# Patient Record
Sex: FEMALE | Race: WHITE | Employment: FULL TIME
[De-identification: names, ages, dates, MRNs, and addresses within clinical notes are randomized per-mention and may not be internally consistent; named-entity substitution may affect disease eponyms.]

---

## 2017-06-03 ENCOUNTER — HEALTH MAINTENANCE LETTER (OUTPATIENT)
Age: 42
End: 2017-06-03

## 2017-07-14 ENCOUNTER — DOCUMENTATION ONLY (OUTPATIENT)
Dept: NEUROSURGERY | Facility: CLINIC | Age: 42
End: 2017-07-14

## 2017-07-14 NOTE — PROGRESS NOTES
[employer] forms complete   Certification paperwork for your family medical leave request Leave ID [id number]  Placed a copy in the bin and sent the original to medical records   Faxed to [phone]

## 2017-12-08 ENCOUNTER — THERAPY VISIT (OUTPATIENT)
Dept: PHYSICAL THERAPY | Facility: CLINIC | Age: 42
End: 2017-12-08
Payer: COMMERCIAL

## 2017-12-08 DIAGNOSIS — G89.29 CHRONIC PAIN OF RIGHT KNEE: Primary | Chronic | ICD-10-CM

## 2017-12-08 DIAGNOSIS — M25.561 CHRONIC PAIN OF RIGHT KNEE: Primary | Chronic | ICD-10-CM

## 2017-12-08 PROCEDURE — 97140 MANUAL THERAPY 1/> REGIONS: CPT | Mod: GP | Performed by: PHYSICAL THERAPIST

## 2017-12-08 PROCEDURE — 97110 THERAPEUTIC EXERCISES: CPT | Mod: GP | Performed by: PHYSICAL THERAPIST

## 2017-12-08 PROCEDURE — 97161 PT EVAL LOW COMPLEX 20 MIN: CPT | Mod: GP | Performed by: PHYSICAL THERAPIST

## 2017-12-08 ASSESSMENT — ACTIVITIES OF DAILY LIVING (ADL)
SQUAT: ACTIVITY IS SOMEWHAT DIFFICULT
STAND: ACTIVITY IS NOT DIFFICULT
RISE FROM A CHAIR: ACTIVITY IS SOMEWHAT DIFFICULT
LIMPING: THE SYMPTOM AFFECTS MY ACTIVITY MODERATELY
KNEE_ACTIVITY_OF_DAILY_LIVING_SUM: 43
HOW_WOULD_YOU_RATE_THE_OVERALL_FUNCTION_OF_YOUR_KNEE_DURING_YOUR_USUAL_DAILY_ACTIVITIES?: ABNORMAL
GO UP STAIRS: ACTIVITY IS SOMEWHAT DIFFICULT
KNEEL ON THE FRONT OF YOUR KNEE: ACTIVITY IS FAIRLY DIFFICULT
PAIN: THE SYMPTOM AFFECTS MY ACTIVITY SEVERELY
WEAKNESS: THE SYMPTOM AFFECTS MY ACTIVITY SLIGHTLY
WALK: ACTIVITY IS MINIMALLY DIFFICULT
KNEE_ACTIVITY_OF_DAILY_LIVING_SCORE: 61.43
RAW_SCORE: 43
AS_A_RESULT_OF_YOUR_KNEE_INJURY,_HOW_WOULD_YOU_RATE_YOUR_CURRENT_LEVEL_OF_DAILY_ACTIVITY?: ABNORMAL
GIVING WAY, BUCKLING OR SHIFTING OF KNEE: THE SYMPTOM AFFECTS MY ACTIVITY SLIGHTLY
SIT WITH YOUR KNEE BENT: ACTIVITY IS NOT DIFFICULT
GO DOWN STAIRS: ACTIVITY IS FAIRLY DIFFICULT
STIFFNESS: THE SYMPTOM AFFECTS MY ACTIVITY SLIGHTLY
SWELLING: I HAVE THE SYMPTOM BUT IT DOES NOT AFFECT MY ACTIVITY

## 2017-12-08 NOTE — LETTER
[facility]  [address]  [facility] [id number] [phone]    2017    Re: Sun Andrade   :   1975  MRN:  [id number]   REFERRING PHYSICIAN:   Referred Self    [facility]  Date of Initial Evaluation:  17  Visits:   1  Reason for Referral:  Chronic pain of right knee    EVALUATION SUMMARY    [facility]    Subjective:    Patient is a 42 year old female presenting with rehab left knee hpi. The history is provided by the patient. No  was used.   Sun Andrade is a 42 year old female with a right knee condition.  Condition occurred with:  Repetition/overuse.  Condition occurred: in the community.  This is a chronic condition   Got really sick in 2017 and was sick for 1.5 months.  Had a significant decreased in activity level during this time.  She significantly increased her activity level in 2017 with running, walking around theme [place] and doing more squatting.  Started as a sharp pain while she was running.  Now she has rested some and had some relief.  Every time she has pain with running.  Pain also increases with using R foot on gas pedal with driving, stairs, and deep squatting. She has gained 15# since July..    Patient reports pain:  Anterior (medial>lateral patella).  Radiates to:  Thigh.  Pain is described as aching and sharp and is constant and reported as 8/10.  Associated symptoms:  Numbness, loss of motion/stiffness, buckling/giving out and edema. Pain is worse in the P.M..  Symptoms are exacerbated by standing, descending stairs, sitting, bending/squatting, running and lying on the extremity and relieved by rest.  Since onset symptoms are unchanged.        General health as reported by patient is good.  Pertinent medical history includes:  Overweight and depression.    Other surgeries  "include:  Orthopedic surgery and other (, lumbar discectomy).  Current medications:  Anti-depressants and anti-inflammatory.  Current occupation is RN - Health     Pt goal: return to running and weight lifting without pain.  Patient is working in normal job without restrictions.  Primary job tasks include:  Prolonged sitting.  Barriers include:  Stairs.  Red flags:  None as reported by the patient.             Objective:    Sun Andrade , : 1975, MRN: [id number]    Physical Therapy Objective Findings  Subjective information, goals, clinical impression, daily documentation and other information found in EPISODES tab.  Knee Objective Findings    Posture: level hips, even wt bearing    Gait:  pes planus, R leg abducted mildly    Foot Position in Standing:  Pes planus    Hip Screen: - scour, - KEVIN, - EDISONIR    Range of Motion:                                    Right AROM            Right PROM Left AROM              Left PROM                Extension wnl wnl wnl wnl   Flexion wnl wnl wnl wnl   Other:         Manual Muscle Testing  (graded 0-5, measured at 0 degrees unless otherwise noted):                                                                       Right                                                  Left                                                      Flexion 4          4   Extension 4+ (++) 5   Hip Abduction 4- 4   Quad Set     VMO     Other: glute max 4- 4-   (+ mild pain, ++ moderate pain, +++ severe pain)    Edema:                                                                        Right                                                  Left                                                             Special Tests:                                                                    Right                                                   Left                                                      Step Test Height 6\" 6\"         -Control Comment Mod medial collapse Minimal " medial collapse   Functional Squat (deg.)     Lachmans -    Posterior Sag -    Anterior Drawer -    Posterior Drawer -    Varus at 0 & 30 -    Valgus at 0 & 30 -    Sury's + medial mensicus    Apley's Distraction     Apley's Compression     External Rotation Test     OTHER:       Flexibility:                                                                    Right                                                   Left                                                      Gastroc wnl wnl   Soleus     Hamstrings wnl wnl   Hip Flexors wnl    Quadricep wnl wnl   ITB wnl wnl          Joint Mobility                                                                       Right                                                   Left                                                       Patellar Static Position Mild lateral tilt Mild tilt lateral   Patellar Passive Mobility normal normal   Patellar Dynamic Mobility Mod lateral tracking normal   Proximal Tib-fib     Tibiofemoral            Palpation:  Tender at medial jt line, tender lateral patellar facet    Assessment/Plan:    Patient is a 42 year old female with right side knee complaints.    Patient has the following significant findings with corresponding treatment plan.                Diagnosis 1:  R knee pain consistent with medial meniscus irritation with mild PFP  Pain -  hot/cold therapy, manual therapy, self management, education and home program  Decreased ROM/flexibility - manual therapy, therapeutic exercise and home program  Decreased joint mobility - manual therapy, therapeutic exercise and home program  Decreased strength - therapeutic exercise, therapeutic activities and home program  Impaired gait - gait training and home program  Decreased function - therapeutic activities and home program    Therapy Evaluation Codes:   1) History comprised of:   Personal factors that impact the plan of care:      Profession and Time since onset of symptoms.    Comorbidity  factors that impact the plan of care are:      Depression and Overweight.     Medications impacting care: Anti-inflammatory.  2) Examination of Body Systems comprised of:   Body structures and functions that impact the plan of care:      Knee.   Activity limitations that impact the plan of care are:      Lifting, Running, Squatting/kneeling, Stairs and Working.  3) Clinical presentation characteristics are:   Stable/Uncomplicated.  4) Decision-Making    Low complexity using standardized patient assessment instrument and/or measureable assessment of functional outcome.  Cumulative Therapy Evaluation is: Low complexity.    Previous and current functional limitations:  (See Goal Flow Sheet for this information)    Short term and Long term goals: (See Goal Flow Sheet for this information)     Communication ability:  Patient appears to be able to clearly communicate and understand verbal and written communication and follow directions correctly.  Treatment Explanation - The following has been discussed with the patient:   RX ordered/plan of care  Anticipated outcomes  Possible risks and side effects  This patient would benefit from PT intervention to resume normal activities.   Rehab potential is good.    Frequency:  1 X week, once daily  Duration:  for 6 weeks  Discharge Plan:  Achieve all LTG.  Independent in home treatment program.  Reach maximal therapeutic benefit.    Thank you for your referral.    INQUIRIES  Therapist:  Paul Zavala,PT, DPT, [facility]    [facility]  [address] [place] [address] [address]  [facility] [address]  Phone: [phone]  Fax: [phone]

## 2017-12-08 NOTE — MR AVS SNAPSHOT
After Visit Summary   12/8/2017    Sun Andrade    MRN: [id number]           Patient Information     Date Of Birth          1975        Visit Information        Provider Department      12/8/2017 8:20 AM Paul Zavala, PT [facility] Physical Therapy        Today's Diagnoses     Chronic pain of right knee    -  1       Follow-ups after your visit        Your next 10 appointments already scheduled     Dec 15, 2017  8:20 AM CST   ROSITA Extremity with Paul Zavala PT   [facility] Physical Therapy ([facility]  )    [address]  [facility] [address]   [phone]            Dec 22, 2017  8:20 AM CST   ROSITA Extremity with Paul Zavala PT   [facility] Physical Therapy ([facility]  )    [address]  [facility] [address]   [phone]            Dec 28, 2017  4:30 PM CST   ROSITA Extremity with Paul Zavala PT   [facility] ([facility]  )    [address]  [facility] [address]   [phone]              Who to contact     If you have questions or need follow up information about today's clinic visit or your schedule please contact [facility] PHYSICAL THERAPY directly at [phone].  Normal or non-critical lab and imaging results will be communicated to you by MyChart, letter or phone within 4 business days after the clinic has received the results. If you do not hear from us within 7 days, please contact the clinic through MyChart or phone. If you have a critical or abnormal lab result, we will notify you by phone as soon as possible.  Submit refill requests through [employer] or call your pharmacy and they will forward the refill request to us. Please allow 3 business days for your refill to be completed.          Additional Information About Your  Visit        MyChart Information     [employer] gives you secure access to your electronic health record. If you see a primary care provider, you can also send messages to your care team and make appointments. If you have questions, please call your primary care clinic.  If you do not have a primary care provider, please call [phone] and they will assist you.        Care EveryWhere ID     This is your Care EveryWhere ID. This could be used by other organizations to access your [facility] medical records  [id number]         Blood Pressure from Last 3 Encounters:   06/23/11 104/64   05/26/11 106/70   01/19/11 108/68    Weight from Last 3 Encounters:   06/23/11 83 kg (183 lb)   05/26/11 81.2 kg (179 lb)   01/19/11 85.3 kg (188 lb)              We Performed the Following     HC PT EVAL, LOW COMPLEXITY     ROSITA INITIAL EVAL REPORT     MANUAL THER TECH,1+REGIONS,EA 15 MIN     THERAPEUTIC EXERCISES        Primary Care Provider Office Phone # Fax #    Vanessa Saldana [phone] [address] [facility] [address]        Equal Access to Services     LUCIUS [facility] WesleyGALILEO : Hadii aad ku hadasho Soomaali, waaxda luqadaha, qaybta kaalmada adecarmenyacindy, yuly marina . So [facility] [phone].    ATENCIÓN: Si habla español, tiene a chun disposición servicios gratuitos de asistencia lingüística. [facility] [phone].    We comply with applicable federal civil rights laws and [place] laws. We do not discriminate on the basis of race, color, national origin, age, disability, sex, sexual orientation, or gender identity.            Thank you!     Thank you for choosing [facility] PHYSICAL THERAPY  for your care. Our goal is always to provide you with excellent care. Hearing back from our patients is one way we can continue to improve our services. Please take a few minutes to complete the written survey that you may receive in the mail after your visit with us.  Thank you!             Your Updated Medication List - Protect others around you: Learn how to safely use, store and throw away your medicines at www.disposemymeds.org.          This list is accurate as of: 12/8/17  1:09 PM.  Always use your most recent med list.                   Brand Name Dispense Instructions for use Diagnosis    NO ACTIVE MEDICATIONS       Screening examination for infectious disease       trimethoprim-polymyxin b ophthalmic solution    POLYTRIM    1 Bottle    Place 1 drop into both eyes 4 times daily.    Conjunctivitis, acute

## 2017-12-08 NOTE — PROGRESS NOTES
[facility] Initial Evaluation    Subjective:    Patient is a 42 year old female presenting with rehab left knee hpi. The history is provided by the patient. No  was used.   Sun Andrade is a 42 year old female with a right knee condition.  Condition occurred with:  Repetition/overuse.  Condition occurred: in the community.  This is a chronic condition   Got really sick in 2017 and was sick for 1.5 months.  Had a significant decreased in activity level during this time.  She significantly increased her activity level in 2017 with running, walking around theme [place] and doing more squatting.  Started as a sharp pain while she was running.  Now she has rested some and had some relief.  Every time she has pain with running.  Pain also increases with using R foot on gas pedal with driving, stairs, and deep squatting. She has gained 15# since July..    Patient reports pain:  Anterior (medial>lateral patella).  Radiates to:  Thigh.  Pain is described as aching and sharp and is constant and reported as 8/10.  Associated symptoms:  Numbness, loss of motion/stiffness, buckling/giving out and edema. Pain is worse in the P.M..  Symptoms are exacerbated by standing, descending stairs, sitting, bending/squatting, running and lying on the extremity and relieved by rest.  Since onset symptoms are unchanged.        General health as reported by patient is good.  Pertinent medical history includes:  Overweight and depression.    Other surgeries include:  Orthopedic surgery and other (, lumbar discectomy).  Current medications:  Anti-depressants and anti-inflammatory.  Current occupation is RN - Health     Pt goal: return to running and weight lifting without pain.  Patient is working in normal job without restrictions.  Primary job tasks include:  Prolonged sitting.    Barriers include:  Stairs.    Red flags:  None as reported by the patient.                     "    Objective:    System    Physical Exam    General     WERO Andrade , : 1975, MRN: [id number]    Physical Therapy Objective Findings  Subjective information, goals, clinical impression, daily documentation and other information found in EPISODES tab.  Knee Objective Findings    Posture: level hips, even wt bearing    Gait:  pes planus, R leg abducted mildly    Foot Position in Standing:  Pes planus    Hip Screen: - scour, - KEVIN, - FADIR    Range of Motion:                                    Right AROM            Right PROM Left AROM              Left PROM                Extension wnl wnl wnl wnl   Flexion wnl wnl wnl wnl   Other:         Manual Muscle Testing  (graded 0-5, measured at 0 degrees unless otherwise noted):                                                                       Right                                                  Left                                                      Flexion 4          4   Extension 4+ (++) 5   Hip Abduction 4- 4   Quad Set     VMO     Other: glute max 4- 4-   (+ mild pain, ++ moderate pain, +++ severe pain)    Edema:                                                                        Right                                                  Left                                                               Special Tests:                                                                    Right                                                   Left                                                      Step Test Height 6\" 6\"         -Control Comment Mod medial collapse Minimal medial collapse   Functional Squat (deg.)     Lachmans -    Posterior Sag -    Anterior Drawer -    Posterior Drawer -    Varus at 0 & 30 -    Valgus at 0 & 30 -    Sury's + medial mensicus    Apley's Distraction     Apley's Compression     External Rotation Test     OTHER:       Flexibility:                                                                    Right           "                                         Left                                                      Gastroc wnl wnl   Soleus     Hamstrings wnl wnl   Hip Flexors wnl    Quadricep wnl wnl   ITB wnl wnl          Joint Mobility                                                                       Right                                                   Left                                                       Patellar Static Position Mild lateral tilt Mild tilt lateral   Patellar Passive Mobility normal normal   Patellar Dynamic Mobility Mod lateral tracking normal   Proximal Tib-fib     Tibiofemoral            Palpation:  Tender at medial jt line, tender lateral patellar facet    Assessment/Plan:      Patient is a 42 year old female with right side knee complaints.    Patient has the following significant findings with corresponding treatment plan.                Diagnosis 1:  R knee pain consistent with medial meniscus irritation with mild PFP    Pain -  hot/cold therapy, manual therapy, self management, education and home program  Decreased ROM/flexibility - manual therapy, therapeutic exercise and home program  Decreased joint mobility - manual therapy, therapeutic exercise and home program  Decreased strength - therapeutic exercise, therapeutic activities and home program  Impaired gait - gait training and home program  Decreased function - therapeutic activities and home program    Therapy Evaluation Codes:   1) History comprised of:   Personal factors that impact the plan of care:      Profession and Time since onset of symptoms.    Comorbidity factors that impact the plan of care are:      Depression and Overweight.     Medications impacting care: Anti-inflammatory.  2) Examination of Body Systems comprised of:   Body structures and functions that impact the plan of care:      Knee.   Activity limitations that impact the plan of care are:      Lifting, Running, Squatting/kneeling, Stairs and Working.  3) Clinical  presentation characteristics are:   Stable/Uncomplicated.  4) Decision-Making    Low complexity using standardized patient assessment instrument and/or measureable assessment of functional outcome.  Cumulative Therapy Evaluation is: Low complexity.    Previous and current functional limitations:  (See Goal Flow Sheet for this information)    Short term and Long term goals: (See Goal Flow Sheet for this information)     Communication ability:  Patient appears to be able to clearly communicate and understand verbal and written communication and follow directions correctly.  Treatment Explanation - The following has been discussed with the patient:   RX ordered/plan of care  Anticipated outcomes  Possible risks and side effects  This patient would benefit from PT intervention to resume normal activities.   Rehab potential is good.    Frequency:  1 X week, once daily  Duration:  for 6 weeks  Discharge Plan:  Achieve all LTG.  Independent in home treatment program.  Reach maximal therapeutic benefit.    Please refer to the daily flowsheet for treatment today, total treatment time and time spent performing 1:1 timed codes.         Paul Zavala,PT, DPT, OCS

## 2017-12-15 ENCOUNTER — THERAPY VISIT (OUTPATIENT)
Dept: PHYSICAL THERAPY | Facility: CLINIC | Age: 42
End: 2017-12-15
Payer: COMMERCIAL

## 2017-12-15 DIAGNOSIS — G89.29 CHRONIC PAIN OF RIGHT KNEE: ICD-10-CM

## 2017-12-15 DIAGNOSIS — M25.561 CHRONIC PAIN OF RIGHT KNEE: ICD-10-CM

## 2017-12-15 PROCEDURE — 97110 THERAPEUTIC EXERCISES: CPT | Mod: GP | Performed by: PHYSICAL THERAPIST

## 2017-12-15 PROCEDURE — 97140 MANUAL THERAPY 1/> REGIONS: CPT | Mod: GP | Performed by: PHYSICAL THERAPIST

## 2017-12-15 PROCEDURE — 97112 NEUROMUSCULAR REEDUCATION: CPT | Mod: GP | Performed by: PHYSICAL THERAPIST

## 2017-12-22 ENCOUNTER — THERAPY VISIT (OUTPATIENT)
Dept: PHYSICAL THERAPY | Facility: CLINIC | Age: 42
End: 2017-12-22
Payer: COMMERCIAL

## 2017-12-22 DIAGNOSIS — G89.29 CHRONIC PAIN OF RIGHT KNEE: ICD-10-CM

## 2017-12-22 DIAGNOSIS — M25.561 CHRONIC PAIN OF RIGHT KNEE: ICD-10-CM

## 2017-12-22 PROCEDURE — 97110 THERAPEUTIC EXERCISES: CPT | Mod: GP | Performed by: PHYSICAL THERAPIST

## 2017-12-22 PROCEDURE — 97112 NEUROMUSCULAR REEDUCATION: CPT | Mod: GP | Performed by: PHYSICAL THERAPIST

## 2017-12-22 PROCEDURE — 97140 MANUAL THERAPY 1/> REGIONS: CPT | Mod: GP | Performed by: PHYSICAL THERAPIST

## 2017-12-28 ENCOUNTER — THERAPY VISIT (OUTPATIENT)
Dept: PHYSICAL THERAPY | Facility: CLINIC | Age: 42
End: 2017-12-28
Payer: COMMERCIAL

## 2017-12-28 DIAGNOSIS — G89.29 CHRONIC PAIN OF RIGHT KNEE: ICD-10-CM

## 2017-12-28 DIAGNOSIS — M25.561 CHRONIC PAIN OF RIGHT KNEE: ICD-10-CM

## 2017-12-28 PROCEDURE — 97112 NEUROMUSCULAR REEDUCATION: CPT | Mod: GP | Performed by: PHYSICAL THERAPIST

## 2017-12-28 PROCEDURE — 97110 THERAPEUTIC EXERCISES: CPT | Mod: GP | Performed by: PHYSICAL THERAPIST

## 2017-12-28 PROCEDURE — 97140 MANUAL THERAPY 1/> REGIONS: CPT | Mod: GP | Performed by: PHYSICAL THERAPIST

## 2018-01-04 ENCOUNTER — THERAPY VISIT (OUTPATIENT)
Dept: PHYSICAL THERAPY | Facility: CLINIC | Age: 43
End: 2018-01-04
Payer: COMMERCIAL

## 2018-01-04 DIAGNOSIS — M25.561 CHRONIC PAIN OF RIGHT KNEE: ICD-10-CM

## 2018-01-04 DIAGNOSIS — G89.29 CHRONIC PAIN OF RIGHT KNEE: ICD-10-CM

## 2018-01-04 PROCEDURE — 97140 MANUAL THERAPY 1/> REGIONS: CPT | Mod: GP | Performed by: PHYSICAL THERAPIST

## 2018-01-04 PROCEDURE — 97110 THERAPEUTIC EXERCISES: CPT | Mod: GP | Performed by: PHYSICAL THERAPIST

## 2018-01-04 PROCEDURE — 97112 NEUROMUSCULAR REEDUCATION: CPT | Mod: GP | Performed by: PHYSICAL THERAPIST

## 2018-01-15 ENCOUNTER — THERAPY VISIT (OUTPATIENT)
Dept: PHYSICAL THERAPY | Facility: CLINIC | Age: 43
End: 2018-01-15
Payer: COMMERCIAL

## 2018-01-15 DIAGNOSIS — M25.561 CHRONIC PAIN OF RIGHT KNEE: ICD-10-CM

## 2018-01-15 DIAGNOSIS — G89.29 CHRONIC PAIN OF RIGHT KNEE: ICD-10-CM

## 2018-01-15 PROCEDURE — 97112 NEUROMUSCULAR REEDUCATION: CPT | Mod: GP | Performed by: PHYSICAL THERAPIST

## 2018-01-15 PROCEDURE — 97140 MANUAL THERAPY 1/> REGIONS: CPT | Mod: GP | Performed by: PHYSICAL THERAPIST

## 2018-01-15 PROCEDURE — 97110 THERAPEUTIC EXERCISES: CPT | Mod: GP | Performed by: PHYSICAL THERAPIST

## 2018-01-15 NOTE — MR AVS SNAPSHOT
After Visit Summary   1/15/2018    Sun Andrade    MRN: [id number]           Patient Information     Date Of Birth          1975        Visit Information        Provider Department      1/15/2018 10:20 AM Paul Zavala PT [facility] Physical [facility]        Today's Diagnoses     Chronic pain of right knee           Follow-ups after your visit        Who to contact     If you have questions or need follow up information about today's clinic visit or your schedule please contact [facility] PHYSICAL THERAPY directly at [phone].  Normal or non-critical lab and imaging results will be communicated to you by [employer], letter or phone within 4 business days after the clinic has received the results. If you do not hear from us within 7 days, please contact the clinic through [employer] or phone. If you have a critical or abnormal lab result, we will notify you by phone as soon as possible.  Submit refill requests through [employer] or call your pharmacy and they will forward the refill request to us. Please allow 3 business days for your refill to be completed.          Additional Information About Your Visit        MyChart Information     [employer] gives you secure access to your electronic health record. If you see a primary care provider, you can also send messages to your care team and make appointments. If you have questions, please call your primary care clinic.  If you do not have a primary care provider, please call [phone] and they will assist you.        Care EveryWhere ID     This is your Care EveryWhere ID. This could be used by other organizations to access your [place] medical records  [id number]         Blood Pressure from Last 3 Encounters:   06/23/11 104/64   05/26/11 106/70   01/19/11 108/68    Weight from Last 3 Encounters:   06/23/11 83 kg (183 lb)   05/26/11 81.2 kg (179 lb)   01/19/11 85.3 kg (188 lb)               We Performed the Following     ROSITA PROGRESS NOTES REPORT     MANUAL THER TECH,1+REGIONS,EA 15 MIN     NEUROMUSCULAR RE-EDUCATION     THERAPEUTIC EXERCISES        Primary Care Provider Office Phone # Fax #    Vanessa Saldana [phone] [address] [place] [place] [address]        Equal Access to Services     LUCIUS HYATT : Hadii aad ku hadasho Soomaali, waaxda luqadaha, qaybta kaalmada adeegyada, waxay idiin hayaan adeeg kharash la'aan ah. So [facility] [phone].    ATENCIÓN: Si habla español, tiene a chun disposición servicios gratuitos de asistencia lingüística. [facility] [phone].    We comply with applicable federal civil rights laws and [place] laws. We do not discriminate on the basis of race, color, national origin, age, disability, sex, sexual orientation, or gender identity.            Thank you!     Thank you for choosing [facility] PHYSICAL THERAPY  for your care. Our goal is always to provide you with excellent care. Hearing back from our patients is one way we can continue to improve our services. Please take a few minutes to complete the written survey that you may receive in the mail after your visit with us. Thank you!             Your Updated Medication List - Protect others around you: Learn how to safely use, store and throw away your medicines at www.disposemymeds.org.          This list is accurate as of: 1/15/18  1:10 PM.  Always use your most recent med list.                   Brand Name Dispense Instructions for use Diagnosis    NO ACTIVE MEDICATIONS       Screening examination for infectious disease       trimethoprim-polymyxin b ophthalmic solution    POLYTRIM    1 Bottle    Place 1 drop into both eyes 4 times daily.    Conjunctivitis, acute

## 2018-01-15 NOTE — PROGRESS NOTES
Subjective:  HPI                    Objective:  System    Physical Exam    General     ROS    Assessment/Plan:    PROGRESS  REPORT    Progress reporting period is from 12/8/17 to 1/15/18.       SUBJECTIVE  Subjective changes noted by patient:  was doing very well, was going to gym 3x/week, no problems doing interval training on treadmill for running, things got more sore again after she slipped on the ice over weekend    Current Pain level: 3/10.     Previous pain level was  NA Initial Pain level: 8/10.   Changes in function:  Yes (See Goal flowsheet attached for changes in current functional level)  Adverse reaction to treatment or activity: None    OBJECTIVE  Changes noted in objective findings:    Objective: + standing forward flex R, + march R, single limb squat: R mod medial collapse, L mild medial collapse, mod los R hip extension, mild lateral tracking R patella, MMT hip abd 4+/5, hip ext 4/5     ASSESSMENT/PLAN  Updated problem list and treatment plan: Diagnosis 1:  R knee pain consistent with medial meniscus irritation with mild PFP    Pain -  hot/cold therapy, manual therapy, self management, education and home program  Decreased joint mobility - manual therapy, therapeutic exercise and home program  Decreased strength - therapeutic exercise, therapeutic activities and home program  Decreased function - therapeutic activities and home program  STG/LTGs have been met or progress has been made towards goals:  Yes (See Goal flow sheet completed today.)  Assessment of Progress: The patient's condition is was improving as expected prior to fall on ice.  Self Management Plans:  Patient has been instructed in a home treatment program.  I have re-evaluated this patient and find that the nature, scope, duration and intensity of the therapy is appropriate for the medical condition of the patient.  Sun continues to require the following intervention to meet STG and LTG's:  PT    Recommendations:  This patient  would benefit from continued therapy.     Frequency:  1 X week, once daily  Duration:  for 1 weeks          Please refer to the daily flowsheet for treatment today, total treatment time and time spent performing 1:1 timed codes.    Paul Zavala,PT, DPT, OCS

## 2018-01-30 ENCOUNTER — THERAPY VISIT (OUTPATIENT)
Dept: PHYSICAL THERAPY | Facility: CLINIC | Age: 43
End: 2018-01-30
Payer: COMMERCIAL

## 2018-01-30 DIAGNOSIS — G89.29 CHRONIC PAIN OF RIGHT KNEE: ICD-10-CM

## 2018-01-30 DIAGNOSIS — M25.561 CHRONIC PAIN OF RIGHT KNEE: ICD-10-CM

## 2018-01-30 PROCEDURE — 97110 THERAPEUTIC EXERCISES: CPT | Mod: GP | Performed by: PHYSICAL THERAPIST

## 2018-01-30 PROCEDURE — 97112 NEUROMUSCULAR REEDUCATION: CPT | Mod: GP | Performed by: PHYSICAL THERAPIST

## 2018-01-30 ASSESSMENT — ACTIVITIES OF DAILY LIVING (ADL)
STIFFNESS: I HAVE THE SYMPTOM BUT IT DOES NOT AFFECT MY ACTIVITY
GIVING WAY, BUCKLING OR SHIFTING OF KNEE: I DO NOT HAVE THE SYMPTOM
AS_A_RESULT_OF_YOUR_KNEE_INJURY,_HOW_WOULD_YOU_RATE_YOUR_CURRENT_LEVEL_OF_DAILY_ACTIVITY?: NEARLY NORMAL
SWELLING: I HAVE THE SYMPTOM BUT IT DOES NOT AFFECT MY ACTIVITY
SIT WITH YOUR KNEE BENT: ACTIVITY IS NOT DIFFICULT
GO DOWN STAIRS: ACTIVITY IS NOT DIFFICULT
WALK: ACTIVITY IS NOT DIFFICULT
LIMPING: I DO NOT HAVE THE SYMPTOM
GO UP STAIRS: ACTIVITY IS NOT DIFFICULT
HOW_WOULD_YOU_RATE_THE_OVERALL_FUNCTION_OF_YOUR_KNEE_DURING_YOUR_USUAL_DAILY_ACTIVITIES?: NORMAL
KNEEL ON THE FRONT OF YOUR KNEE: ACTIVITY IS NOT DIFFICULT
STAND: ACTIVITY IS NOT DIFFICULT
HOW_WOULD_YOU_RATE_THE_CURRENT_FUNCTION_OF_YOUR_KNEE_DURING_YOUR_USUAL_DAILY_ACTIVITIES_ON_A_SCALE_FROM_0_TO_100_WITH_100_BEING_YOUR_LEVEL_OF_KNEE_FUNCTION_PRIOR_TO_YOUR_INJURY_AND_0_BEING_THE_INABILITY_TO_PERFORM_ANY_OF_YOUR_USUAL_DAILY_ACTIVITIES?: 90
WEAKNESS: I DO NOT HAVE THE SYMPTOM
SQUAT: ACTIVITY IS NOT DIFFICULT
RISE FROM A CHAIR: ACTIVITY IS NOT DIFFICULT
KNEE_ACTIVITY_OF_DAILY_LIVING_SUM: 67
KNEE_ACTIVITY_OF_DAILY_LIVING_SCORE: 95.71
RAW_SCORE: 67
PAIN: I HAVE THE SYMPTOM BUT IT DOES NOT AFFECT MY ACTIVITY

## 2018-01-30 NOTE — MR AVS SNAPSHOT
After Visit Summary   1/30/2018    Sun Andrade    MRN: [id number]           Patient Information     Date Of Birth          1975        Visit Information        Provider Department      1/30/2018 5:50 PM Paul Zavala PT [facility] Athletic [facility] Physical [facility]        Today's Diagnoses     Chronic pain of right knee           Follow-ups after your visit        Who to contact     If you have questions or need follow up information about today's clinic visit or your schedule please contact [facility] ATHLETIC [facility] PHYSICAL THERAPY directly at [phone].  Normal or non-critical lab and imaging results will be communicated to you by Connecturehart, letter or phone within 4 business days after the clinic has received the results. If you do not hear from us within 7 days, please contact the clinic through Zentactt or phone. If you have a critical or abnormal lab result, we will notify you by phone as soon as possible.  Submit refill requests through MECLUB or call your pharmacy and they will forward the refill request to us. Please allow 3 business days for your refill to be completed.          Additional Information About Your Visit        MyChart Information     MECLUB gives you secure access to your electronic health record. If you see a primary care provider, you can also send messages to your care team and make appointments. If you have questions, please call your primary care clinic.  If you do not have a primary care provider, please call [phone] and they will assist you.        Care EveryWhere ID     This is your Care EveryWhere ID. This could be used by other organizations to access your [facility] medical records  [id number]         Blood Pressure from Last 3 Encounters:   06/23/11 104/64   05/26/11 106/70   01/19/11 108/68    Weight from Last 3 Encounters:   06/23/11 83 kg (183 lb)   05/26/11 81.2 kg (179 lb)   01/19/11 85.3 kg (188 lb)               We Performed the Following     [facility] PROGRESS NOTES REPORT     NEUROMUSCULAR RE-EDUCATION     THERAPEUTIC EXERCISES        Primary Care Provider Office Phone # Fax #    Vanessa Saldana [phone]       [id number] [facility] [address]        Equal Access to Services     SARAH OLENA : Hadii aad ku hadasho Soomaali, waaxda luqadaha, qaybta kaalmada adeegyada, waxay idiin hayaan adecarmen jordansh lamatedwar . So [facility] [phone].    ATENCIÓN: Si habla español, tiene a chun disposición servicios gratuitos de asistencia lingüística. Llame al [phone].    We comply with applicable federal civil rights laws and [place] laws. We do not discriminate on the basis of race, color, national origin, age, disability, sex, sexual orientation, or gender identity.            Thank you!     Thank you for choosing [facility] PHYSICAL THERAPY  for your care. Our goal is always to provide you with excellent care. Hearing back from our patients is one way we can continue to improve our services. Please take a few minutes to complete the written survey that you may receive in the mail after your visit with us. Thank you!             Your Updated Medication List - Protect others around you: Learn how to safely use, store and throw away your medicines at www.disposemymeds.org.          This list is accurate as of 1/30/18  6:10 PM.  Always use your most recent med list.                   Brand Name Dispense Instructions for use Diagnosis    NO ACTIVE MEDICATIONS       Screening examination for infectious disease       trimethoprim-polymyxin b ophthalmic solution    POLYTRIM    1 Bottle    Place 1 drop into both eyes 4 times daily.    Conjunctivitis, acute

## 2018-01-31 NOTE — PROGRESS NOTES
"Subjective:  HPI                    Objective:  System    Physical Exam    General     ROS    Assessment/Plan:    DISCHARGE REPORT    Progress reporting period is from 1/15/19 to 1/30/19.       SUBJECTIVE  Subjective changes noted by patient:  doing well, occasional throbbing pain (rare), no problems on stairs, squatting going well, running going pretty well,     Current Pain level: 0/10 (worst 2/10).     Previous pain level was  3/10 Initial Pain level: 8/10.   Changes in function:  Yes (See Goal flowsheet attached for changes in current functional level)  Adverse reaction to treatment or activity: None    OBJECTIVE  Changes noted in objective findings:    Objective: single limb bridge: R 20/20 difficulty 2/5, L 20/20 difficulty 1/5, MMT: hip abd: R 4-/5, L 4/5, 6\" ant step down no medial collapse, 7\" ant step down mild medial collapse     ASSESSMENT/PLAN  Updated problem list and treatment plan: Diagnosis 1:  R knee pain consistent with medial meniscus irritation with mild PFP    Pain -  home program  Decreased strength - home program  STG/LTGs have been met or progress has been made towards goals:  Yes (See Goal flow sheet completed today.)  Assessment of Progress: The patient has met all of their long term goals.  Self Management Plans:  Patient has been instructed in a home treatment program.  I have re-evaluated this patient and find that the nature, scope, duration and intensity of the therapy is appropriate for the medical condition of the patient.  Sun continues to require the following intervention to meet STG and LTG's:  PT intervention is no longer required to meet STG/LTG.    Recommendations:  This patient is ready to be discharged from therapy and continue their home treatment program.    Please refer to the daily flowsheet for treatment today, total treatment time and time spent performing 1:1 timed codes.        Paul Zavala,PT, DPT, OCS      "

## 2019-09-29 ENCOUNTER — HEALTH MAINTENANCE LETTER (OUTPATIENT)
Age: 44
End: 2019-09-29

## 2020-03-02 ENCOUNTER — THERAPY VISIT (OUTPATIENT)
Dept: PHYSICAL THERAPY | Facility: CLINIC | Age: 45
End: 2020-03-02
Payer: COMMERCIAL

## 2020-03-02 DIAGNOSIS — M79.652 PAIN OF LEFT THIGH: ICD-10-CM

## 2020-03-02 PROCEDURE — 97110 THERAPEUTIC EXERCISES: CPT | Mod: GP | Performed by: PHYSICAL THERAPIST

## 2020-03-02 PROCEDURE — 97161 PT EVAL LOW COMPLEX 20 MIN: CPT | Mod: GP | Performed by: PHYSICAL THERAPIST

## 2020-03-02 ASSESSMENT — ACTIVITIES OF DAILY LIVING (ADL)
KNEE_ACTIVITY_OF_DAILY_LIVING_SCORE: 84.29
GO UP STAIRS: ACTIVITY IS MINIMALLY DIFFICULT
PAIN: I HAVE THE SYMPTOM BUT IT DOES NOT AFFECT MY ACTIVITY
WEAKNESS: THE SYMPTOM AFFECTS MY ACTIVITY SLIGHTLY
STIFFNESS: I HAVE THE SYMPTOM BUT IT DOES NOT AFFECT MY ACTIVITY
SIT WITH YOUR KNEE BENT: ACTIVITY IS NOT DIFFICULT
AS_A_RESULT_OF_YOUR_KNEE_INJURY,_HOW_WOULD_YOU_RATE_YOUR_CURRENT_LEVEL_OF_DAILY_ACTIVITY?: ABNORMAL
RAW_SCORE: 59
SQUAT: ACTIVITY IS MINIMALLY DIFFICULT
RISE FROM A CHAIR: ACTIVITY IS MINIMALLY DIFFICULT
GO DOWN STAIRS: ACTIVITY IS MINIMALLY DIFFICULT
KNEEL ON THE FRONT OF YOUR KNEE: ACTIVITY IS NOT DIFFICULT
LIMPING: I DO NOT HAVE THE SYMPTOM
HOW_WOULD_YOU_RATE_THE_CURRENT_FUNCTION_OF_YOUR_KNEE_DURING_YOUR_USUAL_DAILY_ACTIVITIES_ON_A_SCALE_FROM_0_TO_100_WITH_100_BEING_YOUR_LEVEL_OF_KNEE_FUNCTION_PRIOR_TO_YOUR_INJURY_AND_0_BEING_THE_INABILITY_TO_PERFORM_ANY_OF_YOUR_USUAL_DAILY_ACTIVITIES?: 90
GIVING WAY, BUCKLING OR SHIFTING OF KNEE: THE SYMPTOM AFFECTS MY ACTIVITY SLIGHTLY
SWELLING: I DO NOT HAVE THE SYMPTOM
KNEE_ACTIVITY_OF_DAILY_LIVING_SUM: 59
STAND: ACTIVITY IS NOT DIFFICULT
HOW_WOULD_YOU_RATE_THE_OVERALL_FUNCTION_OF_YOUR_KNEE_DURING_YOUR_USUAL_DAILY_ACTIVITIES?: NEARLY NORMAL
WALK: ACTIVITY IS MINIMALLY DIFFICULT

## 2020-03-02 NOTE — PROGRESS NOTES
"[facility] Initial Evaluation  Subjective:  The history is provided by the patient. No  was used.   Patient Health History  Sun Andrade being seen for L thigh pain.     Problem began: 2/19/2020.   Problem occurred: doing a lunge   Pain is reported as 0/10 (worst 4/10) on pain scale.  General health as reported by patient is good.  Pertinent medical history includes: anemia, depression and overweight.   Red flags:  None as reported by patient.  Medical allergies: none.    Other surgery history details: lumbar discectomy.    Current medications:  Anti-depressants.    Current occupation is RN case manager.   Primary job tasks include:  Computer work and prolonged sitting.                  Therapist Generated HPI Evaluation  Problem details: She was at the gym and was doing lunges with 9# in R hand.  She felt a \"pop\" in lateral L thigh.  She previously hurt her low back shoveling about 2 weeks prior.  This was her first day back into gym.  She then limited her activity outside of the house for the next week.  She was doing a lot of ice and rest at home to recover.  Now things are doing better.  She has no pain with standing.  Pain with moving sit>stand, walking long distances, putting on shoes, reciprocal pattern on stairs, and squatting..         Type of problem:  Left knee.    This is a new condition.  Condition occurred with:  Repetition/overuse.  Where condition occurred: during recreation/sport.  Patient reports pain:  Lateral.  Pain is described as aching and sharp and is intermittent.  Pain radiates to:  Thigh. Pain is the same all the time.  Since onset symptoms are gradually improving.  Associated symptoms:  Loss of motion/stiffness, buckling/giving out and loss of strength (buckling if walking too fast). Symptoms are exacerbated by walking and bending/squatting  and relieved by ice and rest.    Previous treatment includes physical therapy. There was significant " "improvement following previous treatment.  Restrictions due to condition include:  Working in normal job without restrictions.  Barriers include:  None as reported by patient.           Knee Activity of Daily Living Score: 84.29            Objective:  System    Physical Exam    General     WERO Andrade , : 1975, MRN: [id number]    Physical Therapy Objective Findings  Subjective information, goals, clinical impression, daily documentation and other information found in EPISODES tab.  Knee Objective Findings    Posture:  Pes planus, sway back    Gait:  hyperpronation and increased step width    Foot Position in Standing:  Pes planus    Lumbar Screen: lumbar ROM wnl, pain PA L5    Hip Screen: - KEVIN, - FADIR, - scour    Range of Motion:                                    Right AROM            Right PROM Left AROM              Left PROM                Extension wnl wnl wnl wnl   Flexion wnl wnl wnl wnl   Other:         Manual Muscle Testing  (graded 0-5, measured at 0 degrees unless otherwise noted):                                                                       Right                                                  Left                                                      Flexion 5          4+ pain at ITB insertion   Extension 5 4+ pain lateral quad   Hip Abduction 4 4   Quad Set     VMO     Other: hip ext  Hip ER    double leg lowering 4+  4+    60% 4+  4   (+ mild pain, ++ moderate pain, +++ severe pain)    Edema:                                                                        Right                                                  Left                                                               Special Tests:                                                                    Right                                                   Left                                                      Step Test Height 4\" 4\"         -Control Comment Mild medial collapse Mod medial collapse "   Functional Squat (deg.)     Lachmans  x   Posterior Sag  x   Anterior Drawer  x   Posterior Drawer  x   Varus at 0 & 30  x   Valgus at 0 & 30  x   Sury's  x   Apley's Distraction  x   Apley's Compression  x   External Rotation Test  x   OTHER: patellar compression    Mild + L     Flexibility:                                                                    Right                                                   Left                                                      Gastroc normal normal   Soleus     Hamstrings SLR 90 SLR 90   Hip Flexors     Quadricep normal normal   ITB Mild tightness Mild tightness          Joint Mobility                                                                       Right                                                   Left                                                       Patellar Static Position normal normal   Patellar Passive Mobility normal normal   Patellar Dynamic Mobility Mild lateral tracking Mild lateral tracking   Proximal Tib-fib     Tibiofemoral            Palpation: tender at distal vastus lateralis L    Assessment/Plan:    Patient is a 44 year old female with left side knee complaints.    Patient has the following significant findings with corresponding treatment plan.                Diagnosis 1:  L thigh pain consistent with quad strain    Pain -  hot/cold therapy, manual therapy, self management, education and home program  Decreased ROM/flexibility - manual therapy, therapeutic exercise, therapeutic activity and home program  Decreased strength - therapeutic exercise, therapeutic activities and home program  Impaired balance - neuro re-education, therapeutic activities and home program  Decreased function - therapeutic activities and home program    Therapy Evaluation Codes:   1) History comprised of:   Personal factors that impact the plan of care:      Past/current experiences.    Comorbidity factors that impact the plan of care are:      Depression and  Overweight.     Medications impacting care: Anti-depressant.  2) Examination of Body Systems comprised of:   Body structures and functions that impact the plan of care:      Knee.   Activity limitations that impact the plan of care are:      Bending, Sports, Squatting/kneeling, Stairs and running.  3) Clinical presentation characteristics are:   Stable/Uncomplicated.  4) Decision-Making    Low complexity using standardized patient assessment instrument and/or measureable assessment of functional outcome.  Cumulative Therapy Evaluation is: Low complexity.    Previous and current functional limitations:  (See Goal Flow Sheet for this information)    Short term and Long term goals: (See Goal Flow Sheet for this information)     Communication ability:  Patient appears to be able to clearly communicate and understand verbal and written communication and follow directions correctly.  Treatment Explanation - The following has been discussed with the patient:   RX ordered/plan of care  Anticipated outcomes  Possible risks and side effects  This patient would benefit from PT intervention to resume normal activities.   Rehab potential is good.    Frequency:  1 X/2 weeks2, once daily  Duration:  for 8 weeks  Discharge Plan:  Achieve all LTG.  Independent in home treatment program.  Reach maximal therapeutic benefit.    Please refer to the daily flowsheet for treatment today, total treatment time and time spent performing 1:1 timed codes.     Paul Zavala,PT, DPT, OCS

## 2020-04-16 ENCOUNTER — VIRTUAL VISIT (OUTPATIENT)
Dept: PHYSICAL THERAPY | Facility: CLINIC | Age: 45
End: 2020-04-16
Payer: COMMERCIAL

## 2020-04-16 DIAGNOSIS — M79.652 PAIN OF LEFT THIGH: ICD-10-CM

## 2020-04-16 PROCEDURE — 97110 THERAPEUTIC EXERCISES: CPT | Mod: GT | Performed by: PHYSICAL THERAPIST

## 2020-04-16 PROCEDURE — 97112 NEUROMUSCULAR REEDUCATION: CPT | Mod: GT | Performed by: PHYSICAL THERAPIST

## 2020-04-16 NOTE — PROGRESS NOTES
"Physical Therapy Virtual Follow Up Visit      The patient has been notified of following:     \"This virtual visit will be conducted between you and your provider. We have found that certain health care needs can be provided without the need for physical presence.  This service lets us provide the care you need with a virtual visit.\"    Due to external, as well as internal [facility] management of the COVID-19 Virus, Sun Andrade was not seen in our clinic.  As a substitution, we implemented a virtual visit to manage this patient's condition utilizing the Fixetudex virtual visit platform via the patient s existing code.  The provider, Paul Zavala, reviewed the patient's chart, PTRx prescription, and spoke with the patient to determine the following telemedicine visit is appropriate and effective for the patient's care.    The following type of visit was completed:   Video Visit:  The Fixetudex platform uses a synchronous HIPAA compliant video stream for this patient encounter.        S: Sun Andrade is a 44 year old female. Connected virtually on the Fixetudex platform to discuss their condition/progress. Things have improved since her last appt.  She has start doing some intervals for running.  She did have some tightness in her lower quad.  Typically she doesn't have pain with stairs or squatting.  She will have pain if she does too much.  She is having more soreness in her lower legs and thighs after workouts.  They noted improvements in stairs, squatting, running.  They noted ongoing pain or limitations with weakness and balance with step down exercises.     Current pain level: 0/10  Worst: 2-3/10    O: Patient demonstrated SLS eyes open: R 60 sec,  L 60 sec, tandem stance eyes closed: L forward 13 sec, R forward 5 sec, wall sit: 1:39/3:00 difficulty 5/5    PTRx Content from today's visit:  NMR:   SLS eyes open: x1 min B  Tandem stance eyes closed: 1 finger on counter x 1 min B  SLS dead lift: x10 B  Split " squat x 5 B  Side stepping: red band x 15' B    TE:   Wall sit: 1:39  Bridge #1: x 20  Bridge #2a: x 20  Supine abd #4 x 20  Supine abd #5 x15  S/l hip abd x20 B        A:   Patient is progressing as expected.  Response to therapy has shown an improvement in  strength, muscle control and function  Response to therapy has shown lack of progress in  balance      P: Patient will continue with the exercise program assigned on their PTRx code and will add the following measures to manage their pain/condition: Exercises to continue focus on progressing core/hip strength to improve tolerance to running, stairs, squatting.     Current treatment program is being advanced to more complex exercises.      Virtual visit contact time    Time of service began: 1:51 PM  Time of service ended: 2:35 PM  Total Time for set up, visit, and documentation: 50 minutes    Payor: [employer] / Plan: DARREN [employer] / Product Type: PPO /   .  Procedure Code/s   Therapeutic Exercise (93534): 30 minutes  Neuromuscular Re-education (83861): 14 minutes    I have reviewed the note as documented above.  This accurately captures the substance of my conversation with the patient.  Provider location: [place], [place] ([facility])  Patient location: home    Palu Zavala,PT, DPT, OCS

## 2020-04-30 ENCOUNTER — VIRTUAL VISIT (OUTPATIENT)
Dept: PHYSICAL THERAPY | Facility: CLINIC | Age: 45
End: 2020-04-30
Payer: COMMERCIAL

## 2020-04-30 DIAGNOSIS — M79.652 PAIN OF LEFT THIGH: ICD-10-CM

## 2020-04-30 PROCEDURE — 97112 NEUROMUSCULAR REEDUCATION: CPT | Mod: GT | Performed by: PHYSICAL THERAPIST

## 2020-04-30 PROCEDURE — 97110 THERAPEUTIC EXERCISES: CPT | Mod: GT | Performed by: PHYSICAL THERAPIST

## 2020-04-30 NOTE — PROGRESS NOTES
"Physical Therapy Virtual Follow Up Visit      The patient has been notified of following:     \"This virtual visit will be conducted between you and your provider. We have found that certain health care needs can be provided without the need for physical presence.  This service lets us provide the care you need with a virtual visit.\"    Due to external, as well as internal [facility] management of the COVID-19 Virus, Sun Andrade was not seen in our clinic.  As a substitution, we implemented a virtual visit to manage this patient's condition utilizing the Frodiox virtual visit platform via the patient s existing code.  The provider, Paul Zavala, reviewed the patient's chart, PTRx prescription, and spoke with the patient to determine the following telemedicine visit is appropriate and effective for the patient's care.    The following type of visit was completed:   Video Visit:  The Frodiox platform uses a synchronous HIPAA compliant video stream for this patient encounter.        S: Sun Andrade is a 44 year old female. Connected virtually on the Frodiox platform to discuss their condition/progress. She isn't dealing with much pain.  She will have some mild pain with doing some running or pushing exercises several days in a row. No issues with stairs, squatting and other normal daily activities.  They noted improvements in the amount of exercise that she is doing.  They noted ongoing pain or limitations with exercising too many days in a row.     Current pain level: 0/10  Worst: 1-2/10    O: Patient demonstrated   tandem stance eyes closed: L forward 17 sec, R forward 12 sec, wall sit: 1:47/3:00 difficulty 5/5    PTRx Content from today's visit:  NMR:   Tandem stance eyes closed x 1min work B  Tandem stance with head mov't: up/down and right/left x 20 each B  SLS eyes closed with one hand on counter x 1min B  SLS with dead lift: x10 B    TE:  Wall sit: 1:47  Side lunge x 15 B  Forward lunge: deep position " to half lift 3x10 B  Bridge 2a: x20  Bridge: 3: x20  Quadruped alt arm lift x 10  Quadruped alt leg lift x 10      A:   Patient is progressing as expected.  Response to therapy has shown an improvement in  pain level, strength, balance and muscle control  Strength continues to progress, tolerating her normal exercises routines easier.       P: Patient will continue with the exercise program assigned on their PTRx code and will add the following measures to manage their pain/condition: balance and strength/endurance of glutes/quads     Current treatment program is being advanced to more complex exercises.      Virtual visit contact time    Time of service began: 10:02 AM  Time of service ended: 10:43 AM  Total Time for set up, visit, and documentation: 45 minutes    Payor: [employer] / Plan: DARREN [employer] / Product Type: PPO /   .  Procedure Code/s   Therapeutic Exercise (03261): 23 minutes  Neuromuscular Re-education (32420): 18 minutes    I have reviewed the note as documented above.  This accurately captures the substance of my conversation with the patient.  Provider location: [place], [place] ([facility])  Patient location: home          Paul Zavala,PT, DPT, OCS

## 2020-05-14 ENCOUNTER — VIRTUAL VISIT (OUTPATIENT)
Dept: PHYSICAL THERAPY | Facility: CLINIC | Age: 45
End: 2020-05-14
Payer: COMMERCIAL

## 2020-05-14 DIAGNOSIS — M79.652 PAIN OF LEFT THIGH: ICD-10-CM

## 2020-05-14 PROCEDURE — 97112 NEUROMUSCULAR REEDUCATION: CPT | Mod: 95 | Performed by: PHYSICAL THERAPIST

## 2020-05-14 PROCEDURE — 97110 THERAPEUTIC EXERCISES: CPT | Mod: 95 | Performed by: PHYSICAL THERAPIST

## 2020-05-14 NOTE — PROGRESS NOTES
"PROGRESS  REPORT    Physical Therapy Virtual Follow Up Visit      Progress reporting period is from 3/2/20 to 5/14/20.       The patient has been notified of following:     \"This virtual visit will be conducted between you and your provider. We have found that certain health care needs can be provided without the need for physical presence.  This service lets us provide the care you need with a virtual visit.\"    Due to external, as well as internal [facility] management of the COVID-19 Virus, Sun Andrade was not seen in our clinic.  As a substitution, we implemented a virtual visit to manage this patient's condition utilizing the PTRx virtual visit platform via the patient s existing code.  The provider, Paul Zavala, reviewed the patient's chart, PTRx prescription, and spoke with the patient to determine the following telemedicine visit is appropriate and effective for the patient's care.    The following type of visit was completed:   Video Visit:  The PTRx platform uses a synchronous HIPAA compliant video stream for this patient encounter.      SUBJECTIVE  Sun Andrade is a 45 year old female. Connected virtually on the PTRx platform to discuss their condition/progress. She had a bad week mentally last week.  She was only able to exercises 2x in last week.  She was able to run intervals (1min x 6) with mild soreness.  She did have some pain with repeated forward lunges. They noted improvements in stairs, squatting, running.  They noted ongoing pain or limitations with lunges.     Current pain level: 0/10  Worst: 4/10 with lunges  Previous pain level was  NA  .   Changes in function:  Yes (See Goal flowsheet attached for changes in current functional level)  Adverse reaction to treatment or activity: None    OBJECTIVE  Changes noted in objective findings:    tandem stance eyes closed: L forward 22 sec, R forward 38 sec, wall sit: 2:35/3:00 difficulty 5/5    PTRx Content from today's " visit:  TE:  Wall sit: 2:35  Forward lunge: deep position to half lift 2x10 B  Bridge #3: x20  Quadruped alt arm/leg lift x 20  Supine abd #8 (no arms) x 20          NMR:  Tandem stance eyes closed x 1 min B  Tandem stance with head mov't: up/down and right/left x 20 each B  SLS with opposite leg flex/abd/ext x 20 each B  SLS dead lift x 20 B    ASSESSMENT/PLAN  Updated problem list and treatment plan: Diagnosis 1:  L thigh pain consistent with quad strain    Pain -  hot/cold therapy, manual therapy, self management, education and home program  Impaired balance - neuro re-education, therapeutic activities and home program  Decreased function - therapeutic activities and home program  STG/LTGs have been met or progress has been made towards goals:  Yes (See Goal flow sheet completed today.)  Assessment of Progress: The patient's condition is improving.  Self Management Plans:  Patient has been instructed in a home treatment program.  I have re-evaluated this patient and find that the nature, scope, duration and intensity of the therapy is appropriate for the medical condition of the patient.  Sun continues to require the following intervention to meet STG and LTG's:  PT    Recommendations:  Patient will continue with the exercise program assigned on their PTRx code.  This patient would benefit from continued therapy.     Frequency:  1 X/2 weeks, once daily  Duration:  for 4 weeks          Virtual visit contact time    Time of service began: 10:02 AM  Time of service ended: 10:44 AM  Total Time for set up, visit, and documentation: 45 minutes    Payor: ILDA / Plan: [employer] / Product Type: PPO /   .  Procedure Code/s   Therapeutic Exercise (23194): 17 minutes  Neuromuscular Re-education (16679): 23 minutes      I have reviewed the note as documented above.  This accurately captures the substance of my conversation with the patient.  Provider location: [place], [place] ([facility])  Patient  location: home    Paul Zavala,PT, DPT, OCS

## 2020-05-28 ENCOUNTER — VIRTUAL VISIT (OUTPATIENT)
Dept: PHYSICAL THERAPY | Facility: CLINIC | Age: 45
End: 2020-05-28
Payer: COMMERCIAL

## 2020-05-28 DIAGNOSIS — M79.652 PAIN OF LEFT THIGH: ICD-10-CM

## 2020-05-28 PROCEDURE — 97112 NEUROMUSCULAR REEDUCATION: CPT | Mod: GT | Performed by: PHYSICAL THERAPIST

## 2020-05-28 PROCEDURE — 97110 THERAPEUTIC EXERCISES: CPT | Mod: GT | Performed by: PHYSICAL THERAPIST

## 2020-05-28 NOTE — PROGRESS NOTES
"Physical Therapy Virtual Follow Up Visit      The patient has been notified of following:     \"This virtual visit will be conducted between you and your provider. We have found that certain health care needs can be provided without the need for physical presence.  This service lets us provide the care you need with a virtual visit.\"    Due to external, as well as internal [facility] management of the COVID-19 Virus, Sun Andrade was not seen in our clinic.  As a substitution, we implemented a virtual visit to manage this patient's condition utilizing the PTRx virtual visit platform via the patient s existing code.  The provider, Paul Zavala, reviewed the patient's chart, PTRx prescription, and spoke with the patient to determine the following telemedicine visit is appropriate and effective for the patient's care.    The following type of visit was completed:   Video Visit:  The PTRx platform uses a synchronous HIPAA compliant video stream for this patient encounter.        S: Sun Andrade is a 45 year old female. Connected virtually on the [employer] platform to discuss their condition/progress. She way doing really well.  She got her new treadmill, she had to move it from the garage and into her basement.  She felt some of the pain while trying to move the treadmill.  She had more pain with doing lunge after moving treadmill. They noted ongoing pain or limitations with lunges, mildly on stairs.     Current pain level: 1/10  Worst 7/10    O: Patient demonstrated   Tender point in L vastus lateralis  Wall sit: 2:44/3:00 difficulty 5/5       PTRx Content from today's visit:  Self:  Instruction STM with broom handle to L quad x 2 min    TE:  Quad stretch B 2x30 sec  Wall sit: 2:44  Bridge #4 x 30 B  Quad opposite arm/leg lift x 20  Squat x 20  10\" step up 2x20 B    NMR:  Single limb dead lift holding yellow band x 20 B  Side stepping in mini squat with light band around feet x 2 min  4\" step down x 15 " B            A:   Patient has experienced an exacerbation of symptoms.  Response to therapy has shown an improvement in  strength and function  Response to therapy has shown a worsening of  pain level      P: Patient will continue with the exercise program assigned on their PTRx code and will add the following measures to manage their pain/condition: step exercises      Current treatment program is being advanced to more complex exercises.      Virtual visit contact time    Time of service began: 10:02 AM  Time of service ended: 10:41 AM  Total Time for set up, visit, and documentation: 43 minutes    Payor: ILDA / Plan: [employer] / Product Type: PPO /   .  Procedure Code/s   Therapeutic Exercise (14914): 27 minutes  Neuromuscular Re-education (57526): 10 minutes  Self Care / Home Management Training (89898): 2 minutes    I have reviewed the note as documented above.  This accurately captures the substance of my conversation with the patient.  Provider location: [place], [place] ([facility])  Patient location: home        Paul ZavalaPT, DPT, OCS

## 2020-06-05 ENCOUNTER — VIRTUAL VISIT (OUTPATIENT)
Dept: FAMILY MEDICINE | Facility: OTHER | Age: 45
End: 2020-06-05

## 2020-06-05 ENCOUNTER — NURSE TRIAGE (OUTPATIENT)
Dept: NURSING | Facility: CLINIC | Age: 45
End: 2020-06-05

## 2020-06-05 NOTE — TELEPHONE ENCOUNTER
Patient reporting she had been at mass gathering with protests over past weekend. Patient is aware [place] Dept of Health has recommended COVID 19 testing for any person who has attended the protests.    Patient is requesting testing.  Referred to [email or web address]    Agnes Malone RN  [facility]      COVID 19 Nurse Triage Plan/Patient Instructions    Please be aware that novel coronavirus (COVID-19) may be circulating in the community. If you develop symptoms such as fever, cough, or SOB or if you have concerns about the presence of another infection including coronavirus (COVID-19), please contact your health care provider or visit [email or web address].     Disposition/Instructions    Patient to schedule a Virtual Visit with provider. Reference Visit Selection Guide.    Thank you for taking steps to prevent the spread of this virus.  o Limit your contact with others.  o Wear a simple mask to cover your cough.  o Wash your hands well and often.    Resources    [facility]: About COVID-19: [email or web address]    CDC: What to Do If You're Sick: www.cdc.gov/coronavirus/2019-ncov/about/steps-when-sick.html    CDC: Ending Home Isolation: www.cdc.gov/coronavirus/2019-ncov/hcp/disposition-in-home-patients.html     CDC: Caring for Someone: www.cdc.gov/coronavirus/2019-ncov/if-you-are-sick/care-for-someone.html     [facility]: Interim Guidance for Hospital Discharge to Home: www.health.[facility].[place]/diseases/coronavirus/hcp/hospdischarge.pdf    [facility] clinical trials (COVID-19 research studies): clinicalaffairs.[facility]-clinical-trials     Below are the COVID-19 hotlines at the [facility] ([facility]). Interpreters are available.   o For health questions: Call 189-996-9175 or 1-146.964.7846 (7 a.m. to 7 p.m.)  o For questions about schools and childcare: Call 460-334-2255 or 1-840.495.2622 (7 a.m. to 7 p.m.)                       Reason for Disposition    [1] COVID-19 EXPOSURE (Close  Contact) AND [2] within last 14 days BUT [3] NO symptoms    Additional Information    Negative: [1] Caller is not with the adult (patient) AND [2] reporting urgent symptoms    Negative: Lab result questions    Negative: Medication questions    Negative: Caller can't be reached by phone    Negative: Caller has already spoken to PCP or another triager    Negative: RN needs further essential information from caller in order to complete triage    Protocols used: CORONAVIRUS (COVID-19) EXPOSURE-A- 5.16.20, INFORMATION ONLY CALL-A-AH

## 2020-06-05 NOTE — PROGRESS NOTES
"Date: 2020 10:29:38  Clinician: Lucila Marie  Clinician NPI: [id number]  Patient: Sun Andrade  Patient : 1975  Patient Address: [address] [place] [id number]  Patient Phone: [phone]  Visit Protocol: URI  Patient Summary:  Sun is a 45 year old ( : 1975 ) female who initiated a Visit for COVID-19 (Coronavirus) evaluation and screening. When asked the question \"Please sign me up to receive news, health information and promotions. \", Sun responded \"No\".    Sun does not have any symptoms. Sun denies having wheezing, nausea, teeth pain, ageusia, diarrhea, sore throat, enlarged lymph nodes, malaise, myalgias, anosmia, facial pain or pressure, fever, cough, nasal congestion, vomiting, rhinitis, ear pain, headache, and chills. She also denies having recent facial or sinus surgery in the past 60 days and taking antibiotic medication for the symptoms. She is not experiencing dyspnea.    Pertinent COVID-19 (Coronavirus) information  In the past 14 days, Sun has not worked in a congregate living setting.   She does not work or volunteer as healthcare worker or a  and does not work or volunteer in a healthcare facility.   Sun also has not lived in a congregate living setting in the past 14 days. She does not live with a healthcare worker.   Sun has not had a close contact with a laboratory-confirmed COVID-19 patient within 14 days of symptom onset.   Pertinent medical history  Sun does not get yeast infections when she takes antibiotics.   Sun does not need a return to work/school note.   Weight: 180 lbs   Sun does not smoke or use smokeless tobacco.   She denies pregnancy and denies breastfeeding. She does not menstruate.   Additional information as reported by the patient (free text): I do have many drug allergies but the system would not let me enter them but I do not need any meds at this time.   Myself and my teen helped distributing food " this weekend in [place] and it has been advised by the [facility] that we be tested for COVID-19. Neither of us have symptom. I was told you guys could help us get the testing as our PCP is not allowing any asymptomatic testing. My daugther's name is Yumiko Emerson  2003 if needed.   Weight: 180 lbs    MEDICATIONS: Wellbutrin SR oral, ALLERGIES: NKDA  Clinician Response:  Dear Sun   [facility] is not testing asymptomatic patients at this time as we do not have the testing capacity and because you can develop the virus up to 14 days from exposure. I would have you self quarantine for 14 days if you have had known exposure. Follow up to get further evaluated and tested if any symptoms develop. There may be other medical groups testing asymptomatic patients you can check the [place]. Gov website for testing sites if desired.&nbsp;     Diagnosis: Worries  Diagnosis ICD: R45.82

## 2020-06-11 ENCOUNTER — VIRTUAL VISIT (OUTPATIENT)
Dept: PHYSICAL THERAPY | Facility: CLINIC | Age: 45
End: 2020-06-11
Payer: COMMERCIAL

## 2020-06-11 DIAGNOSIS — M79.652 PAIN OF LEFT THIGH: ICD-10-CM

## 2020-06-11 PROCEDURE — 97110 THERAPEUTIC EXERCISES: CPT | Mod: GT | Performed by: PHYSICAL THERAPIST

## 2020-06-11 PROCEDURE — 97112 NEUROMUSCULAR REEDUCATION: CPT | Mod: GT | Performed by: PHYSICAL THERAPIST

## 2020-06-11 NOTE — LETTER
"[facility]  [address]  [facility] [id number] [phone]    2020    Re: Sun Andrade   :   1975  MRN:  [id number]   REFERRING PHYSICIAN:   Sary Khan    [facility]    Date of Initial Evaluation:  2020  Visits:  Rxs Used: 6  Reason for Referral:  Pain of left thigh    EVALUATION SUMMARY    PROGRESS  REPORT    Physical Therapy Virtual Follow Up Visit  Progress reporting period is from 20 to 20.       The patient has been notified of following:     \"This virtual visit will be conducted between you and your provider. We have found that certain health care needs can be provided without the need for physical presence.  This service lets us provide the care you need with a virtual visit.\"    Due to external, as well as internal [facility] management of the COVID-19 Virus, Sun Andrade was not seen in our clinic.  As a substitution, we implemented a virtual visit to manage this patient's condition utilizing the [employer] virtual visit platform via the patient s existing code.  The provider, Paul Zavala, reviewed the patient's chart, PTRx prescription, and spoke with the patient to determine the following telemedicine visit is appropriate and effective for the patient's care.    The following type of visit was completed:   Video Visit:  The [employer] platform uses a synchronous HIPAA compliant video stream for this patient encounter.      SUBJECTIVE  Sun Andrade is a 45 year old female. Connected virtually on the [employer] platform to discuss their condition/progress. She is doing a lot of walking workouts without pain.  She was able to do some sumo squats with mild soreness in legs.  She hasn't tried running.  They noted improvements in squatting, stairs, strength.  They noted ongoing pain or limitations with balance with step down exercises.   " "  Current pain level: 0/10  Worst 1/10  Previous pain level was  NA  .   Changes in function:  Yes (See Goal flowsheet attached for changes in current functional level)  Adverse reaction to treatment or activity: None    OBJECTIVE  Changes noted in objective findings:  Yes, Patient demonstrated   Mild tender point L vastus lateralis  8\" step down: mod medial collapse  PTRx Content from today's visit:  TE:   Wall sit: 2:10  Quad stretch 2x30 sec B  Bridge #5 x 20 B  10' ant step up x30 B  Side plank basic with hip abd x 16 B  Single limb toe raise x 20 B  NMR:   Hamstring curls with feet on ball x 20  iso bridge with legs straight on ball with leg lift 2x10  Supine rollout on ball x 10  Side stepping in mini squat medium band around ankles x 3 min    ASSESSMENT/PLAN  Updated problem list and treatment plan: Diagnosis 1:  L thigh pain consistent with quad strain    Pain -  hot/cold therapy, self management and home program  Decreased strength - therapeutic exercise, therapeutic activities and home program  Decreased function - therapeutic activities and home program  STG/LTGs have been met or progress has been made towards goals:  Yes (See Goal flow sheet completed today.)  Assessment of Progress: The patient's condition is improving.  Self Management Plans:  Patient has been instructed in a home treatment program.  I have re-evaluated this patient and find that the nature, scope, duration and intensity of the therapy is appropriate for the medical condition of the patient.  Sun continues to require the following intervention to meet STG and LTG's:  PT    Recommendations:  Patient will continue with the exercise program assigned on their PTRx code.  This patient would benefit from continued therapy.     Frequency:  1 X/2 weeks, once daily  Duration:  for 2 weeks    Virtual visit contact time  Time of service began: 10:07 AM  Time of service ended: 10:47 AM  Total Time for set up, visit, and documentation: 43 " minutes  Payor: [employer] / Plan: [employer] / Product Type: PPO /   Procedure Code/s   Therapeutic Exercise (43220): 28 minutes  Neuromuscular Re-education (67807): 13 minutes    I have reviewed the note as documented above.  This accurately captures the substance of my conversation with the patient.  Provider location: [place], [place] ([facility])  Patient location: home    Thank you for your referral.    INQUIRIES  Therapist:  Paul Zavala,PT, DPT, [facility]  [facility]  [address] [place] [address] [address]  [facility] [address]  Phone: [phone]  Fax: [phone]

## 2020-06-11 NOTE — PROGRESS NOTES
"PROGRESS  REPORT    Physical Therapy Virtual Follow Up Visit      Progress reporting period is from 4/16/20 to 6/11/20.       The patient has been notified of following:     \"This virtual visit will be conducted between you and your provider. We have found that certain health care needs can be provided without the need for physical presence.  This service lets us provide the care you need with a virtual visit.\"    Due to external, as well as internal [facility] management of the COVID-19 Virus, Sun Andrade was not seen in our clinic.  As a substitution, we implemented a virtual visit to manage this patient's condition utilizing the PTRx virtual visit platform via the patient s existing code.  The provider, Paul Zavala, reviewed the patient's chart, PTRx prescription, and spoke with the patient to determine the following telemedicine visit is appropriate and effective for the patient's care.    The following type of visit was completed:   Video Visit:  The PTRx platform uses a synchronous HIPAA compliant video stream for this patient encounter.      SUBJECTIVE  Sun Andrade is a 45 year old female. Connected virtually on the PTRx platform to discuss their condition/progress. She is doing a lot of walking workouts without pain.  She was able to do some sumo squats with mild soreness in legs.  She hasn't tried running.  They noted improvements in squatting, stairs, strength.  They noted ongoing pain or limitations with balance with step down exercises.     Current pain level: 0/10  Worst 1/10  Previous pain level was  NA  .   Changes in function:  Yes (See Goal flowsheet attached for changes in current functional level)  Adverse reaction to treatment or activity: None    OBJECTIVE  Changes noted in objective findings:  Yes, Patient demonstrated   Mild tender point L vastus lateralis  8\" step down: mod medial collapse         PTRx Content from today's visit:  TE:   Wall sit: 2:10  Quad stretch 2x30 " sec B  Bridge #5 x 20 B  10' ant step up x30 B  Side plank basic with hip abd x 16 B  Single limb toe raise x 20 B      NMR:   Hamstring curls with feet on ball x 20  iso bridge with legs straight on ball with leg lift 2x10  Supine rollout on ball x 10  Side stepping in mini squat medium band around ankles x 3 min          ASSESSMENT/PLAN  Updated problem list and treatment plan: Diagnosis 1:  L thigh pain consistent with quad strain    Pain -  hot/cold therapy, self management and home program  Decreased strength - therapeutic exercise, therapeutic activities and home program  Decreased function - therapeutic activities and home program  STG/LTGs have been met or progress has been made towards goals:  Yes (See Goal flow sheet completed today.)  Assessment of Progress: The patient's condition is improving.  Self Management Plans:  Patient has been instructed in a home treatment program.  I have re-evaluated this patient and find that the nature, scope, duration and intensity of the therapy is appropriate for the medical condition of the patient.  Sun continues to require the following intervention to meet STG and LTG's:  PT    Recommendations:  Patient will continue with the exercise program assigned on their PTRx code.  This patient would benefit from continued therapy.     Frequency:  1 X/2 weeks, once daily  Duration:  for 2 weeks          Virtual visit contact time    Time of service began: 10:07 AM  Time of service ended: 10:47 AM  Total Time for set up, visit, and documentation: 43 minutes    Payor: ILDA / Plan: [employer] / Product Type: PPO /   .  Procedure Code/s   Therapeutic Exercise (29851): 28 minutes  Neuromuscular Re-education (16855): 13 minutes    I have reviewed the note as documented above.  This accurately captures the substance of my conversation with the patient.  Provider location: [place], [place] ([facility])  Patient location: home            Paul Zavala,PT, DPT,  OCS

## 2020-07-09 ENCOUNTER — THERAPY VISIT (OUTPATIENT)
Dept: PHYSICAL THERAPY | Facility: CLINIC | Age: 45
End: 2020-07-09
Payer: COMMERCIAL

## 2020-07-09 DIAGNOSIS — M79.652 PAIN OF LEFT THIGH: ICD-10-CM

## 2020-07-09 PROCEDURE — 97110 THERAPEUTIC EXERCISES: CPT | Mod: GP | Performed by: PHYSICAL THERAPIST

## 2020-07-09 PROCEDURE — 97112 NEUROMUSCULAR REEDUCATION: CPT | Mod: GP | Performed by: PHYSICAL THERAPIST

## 2020-07-09 NOTE — PROGRESS NOTES
Subjective:  HPI  Physical Exam                    Objective:  System    Physical Exam    General     ROS    Assessment/Plan:    DISCHARGE REPORT    Progress reporting period is from 6/11/20 to 7/9/20.       SUBJECTIVE  Subjective changes noted by patient:   things are going pretty well, she has been more inconsistent with her HEP in last couple weeks, she was helping her mom after her surgery, she has been doing some more exercise programs (squats, lunges, dead lifts), she will have some light pain if she over does her exercises    Current Pain level: 0/10(worst 3/10).     Previous pain level was  0-1/10  .   Changes in function:  Yes (See Goal flowsheet attached for changes in current functional level)  Adverse reaction to treatment or activity: None    OBJECTIVE  Changes noted in objective findings:    Objective: good technique with squat, no medial collapse with single limb squat R, mod medial collapse with single limb squat L, single limb bridge: R 20/20 difficulty 2/5, L 20/20 difficulty 2/5, s/l hip abd: R 30/30 difficulty 4/5, L 30/30 difficulty 4/5     ASSESSMENT/PLAN  Updated problem list and treatment plan: Diagnosis 1:  L thigh pain consistent with quad strain    Pain -  home program  Decreased strength - home program  Decreased function - home program  STG/LTGs have been met or progress has been made towards goals:  Yes (See Goal flow sheet completed today.)  Assessment of Progress: The patient has met all of their long term goals.  Self Management Plans:  Patient has been instructed in a home treatment program.  I have re-evaluated this patient and find that the nature, scope, duration and intensity of the therapy is appropriate for the medical condition of the patient.  Sun continues to require the following intervention to meet STG and LTG's:  PT intervention is no longer required to meet STG/LTG.    Recommendations:  This patient is ready to be discharged from therapy and continue their home  treatment program.    Please refer to the daily flowsheet for treatment today, total treatment time and time spent performing 1:1 timed codes.      Paul Zavala,PT, DPT, OCS

## 2020-07-09 NOTE — LETTER
[facility] ATHLETIC [facility] PHYSICAL THERAPY  [address]  [facility] [address] [phone]    2020    Re: Sun Andrade   :   1975  MRN:  [id number]   REFERRING PHYSICIAN:   Sary Khan    [facility]  Date of Initial Evaluation:  2020  Visits:  Rxs Used: 7  Reason for Referral:  Pain of left thigh    DISCHARGE REPORT  Progress reporting period is from 20 to 20.       SUBJECTIVE  Subjective changes noted by patient:   things are going pretty well, she has been more inconsistent with her HEP in last couple weeks, she was helping her mom after her surgery, she has been doing some more exercise programs (squats, lunges, dead lifts), she will have some light pain if she over does her exercises.Current Pain level: 0/10(worst 3/10).   Previous pain level was  0-1/10. Changes in function:  Yes (See Goal flowsheet attached for changes in current functional level).  Adverse reaction to treatment or activity: None    OBJECTIVE  Changes noted in objective findings:    Objective: good technique with squat, no medial collapse with single limb squat R, mod medial collapse with single limb squat L, single limb bridge: R 20/20 difficulty 2/5, L 20/20 difficulty 2/5, s/l hip abd: R 30/30 difficulty 4/5, L 30/30 difficulty 4/5     ASSESSMENT/PLAN  Updated problem list and treatment plan: Diagnosis 1:  L thigh pain consistent with quad strain    Pain -  home program  Decreased strength - home program  Decreased function - home program  STG/LTGs have been met or progress has been made towards goals:  Yes (See Goal flow sheet completed today.)  Assessment of Progress: The patient has met all of their long term goals.  Self Management Plans:  Patient has been instructed in a home treatment program.  I have re-evaluated this patient and find that the nature, scope, duration and intensity of the therapy is  appropriate for the medical condition of the patient.  Sun continues to require the following intervention to meet STG and LTG's:  PT intervention is no longer required to meet STG/LTG.    Recommendations:  This patient is ready to be discharged from therapy and continue their home treatment program.    Thank you for your referral.    INQUIRIES  Therapist: Paul Zavala,PT, DPT, \[facility] [place] [facility]\""  [facility]  [address] [place] [address] [address]  [facility] [address]  Phone: [phone]  Fax: [phone]

## 2020-09-09 ENCOUNTER — OFFICE VISIT (OUTPATIENT)
Dept: ALLERGY | Facility: OTHER | Age: 45
End: 2020-09-09
Payer: COMMERCIAL

## 2020-09-09 VITALS
SYSTOLIC BLOOD PRESSURE: 112 MMHG | HEIGHT: 65 IN | HEART RATE: 67 BPM | WEIGHT: 187 LBS | OXYGEN SATURATION: 95 % | DIASTOLIC BLOOD PRESSURE: 76 MMHG | TEMPERATURE: 97.5 F | BODY MASS INDEX: 31.16 KG/M2

## 2020-09-09 DIAGNOSIS — J30.89 ALLERGIC RHINITIS DUE TO DUST MITE: ICD-10-CM

## 2020-09-09 DIAGNOSIS — L27.0 ALLERGIC DRUG RASH DUE TO MULTIPLE AGENTS: Primary | ICD-10-CM

## 2020-09-09 DIAGNOSIS — J30.1 SEASONAL ALLERGIC RHINITIS DUE TO POLLEN: ICD-10-CM

## 2020-09-09 DIAGNOSIS — T50.915A ALLERGIC DRUG RASH DUE TO MULTIPLE AGENTS: Primary | ICD-10-CM

## 2020-09-09 PROCEDURE — 86003 ALLG SPEC IGE CRUDE XTRC EA: CPT | Mod: 91 | Performed by: ALLERGY & IMMUNOLOGY

## 2020-09-09 PROCEDURE — 83520 IMMUNOASSAY QUANT NOS NONAB: CPT | Performed by: ALLERGY & IMMUNOLOGY

## 2020-09-09 PROCEDURE — 99204 OFFICE O/P NEW MOD 45 MIN: CPT | Mod: 25 | Performed by: ALLERGY & IMMUNOLOGY

## 2020-09-09 PROCEDURE — 95004 PERQ TESTS W/ALRGNC XTRCS: CPT | Performed by: ALLERGY & IMMUNOLOGY

## 2020-09-09 PROCEDURE — 36415 COLL VENOUS BLD VENIPUNCTURE: CPT | Performed by: ALLERGY & IMMUNOLOGY

## 2020-09-09 PROCEDURE — 86003 ALLG SPEC IGE CRUDE XTRC EA: CPT | Performed by: ALLERGY & IMMUNOLOGY

## 2020-09-09 RX ORDER — CHLORAL HYDRATE 500 MG
1000 CAPSULE ORAL
COMMUNITY

## 2020-09-09 RX ORDER — BUPROPION HYDROCHLORIDE 150 MG/1
450 TABLET, EXTENDED RELEASE ORAL ONCE
COMMUNITY
Start: 2020-01-23

## 2020-09-09 RX ORDER — PNV NO.95/FERROUS FUM/FOLIC AC 28MG-0.8MG
325 TABLET ORAL
COMMUNITY

## 2020-09-09 RX ORDER — ASCORBIC ACID 100 MG
TABLET,CHEWABLE ORAL
COMMUNITY

## 2020-09-09 RX ORDER — NAPROXEN SODIUM 220 MG
220 TABLET ORAL 2 TIMES DAILY WITH MEALS
COMMUNITY

## 2020-09-09 ASSESSMENT — MIFFLIN-ST. JEOR: SCORE: 1485.14

## 2020-09-09 NOTE — ASSESSMENT & PLAN NOTE
Nasal and ocular symptoms in the spring and fall. Symptoms when travels to southern climates regardless of time of the year.    Allergy testing positive for dust mites and grass. Equivocal for ragweed.     - Allergen avoidance measures discussed and literature provided.   - Flonase 2 sprays/nostril daily.   - Zyrtec as needed.   - Consider allergy shots. Checking ragweed IgE and will include if she decides to start allergy shots.

## 2020-09-09 NOTE — PROGRESS NOTES
Sun Andrade is a 45 year old White female with previous medical history significant for drug allergy, rhinoconjunctivitis. Sun Andrade is being seen today for evaluation of allergies to medication and seasonal allergies.     The patient reports that she has reacted over the years to numerous medications.  1 of which has been penicillin.  This was greater than 10 years ago.  She developed some sort of rash with this.  She does not recall how far into the antibiotic she was until she developed the rash.  She is subsequently avoided penicillin antibiotics.  She reports that with Keflex approximately 3 days into taking she has developed a rash as well.  With Bactrim recently she had rash and vomiting.  This was immediate.  With opioid medication she has had diffuse itching.  This even occurs if she touches an opioid medication.  No other systemic symptoms.    Patient also reports that she has had rhinorrhea, sneezing, postnasal drainage, ocular itching, ocular redness, ocular watering during the spring and fall.  She has symptoms if she travels south regardless of the time of the year to either [place], [place], [place] or [place].  No problems around cats or dogs.  Treatment with Flonase, Singulair and either Allegra or Zyrtec has been beneficial.  No history of allergy testing.  No problems around mowing grass or raking leaves.    The patient has no history of asthma, eczema, food allergies, or hives.     ENVIRONMENTAL HISTORY: [place] The home is heated with a forced air. They do not have central air conditioning. The patient's bedroom is furnished with carpeting in bedroom.  Pets inside the house include 2 cat(s). There is no history of cockroach or mice infestation. There is/are 0 smokers in the house.  The house does not have a damp basement.     Past Medical History:   Diagnosis Date     Anxiety 11/18/2008     Attention deficit disorder of adult 11/18/2008      Depression 2008     Enlargement - tonsil/adenoid 2008     Itching 2008    in response to multiple medications-- see allergy list     Sleep apnea     mild; felt related to adenoids; no treatment     Family History   Problem Relation Age of Onset     Depression Mother      Breast Cancer Maternal Grandmother      Depression Maternal Grandmother      Diabetes Maternal Grandfather      C.A.D. Paternal Grandfather         CHF     Diabetes Paternal Grandmother      Asthma Daughter      Past Surgical History:   Procedure Laterality Date     C  DELIVERY ONLY       LAPAROSCOPY,TUBAL CAUTERY       SURGICAL HISTORY OF -   09    discectomy L5-S1 for herniated disc       REVIEW OF SYSTEMS:  General: negative for weight gain. negative for weight loss. negative for changes in sleep.   Ears: negative for fullness. negative for hearing loss. negative for dizziness.   Nose: negative for snoring.negative for changes in smell. positive  for drainage.   Eyes: positive  for eye watering. positive  for eye itching. negative for vision changes. negative for eye redness.  Throat: negative for hoarseness. negative for sore throat. negative for trouble swallowing.   Lungs: negative for shortness of breath.negative for wheezing. negative for sputum production.   Cardiovascular: negative for chest pain. negative for swelling of ankles. negative for fast or irregular heartbeat.   Gastrointestinal: negative for nausea. negative for heartburn. negative for acid reflux.   Musculoskeletal: positive  for joint pain. positive  for joint stiffness. negative for joint swelling.   Neurologic: negative for seizures. negative for fainting. negative for weakness.   Psychiatric: negative for changes in mood. positive  for anxiety.   Endocrine: negative for cold intolerance. negative for heat intolerance. negative for tremors.   Lymphatic: negative for lower extremity swelling. negative for lymph node swelling.    Hematologic: negative for easy bruising. negative for easy bleeding.  Integumentary: negative for rash. negative for scaling. negative for nail changes.       Current Outpatient Medications:      Ascorbic Acid (VITAMIN C) 100 MG CHEW, , Disp: , Rfl:      buPROPion (WELLBUTRIN SR) 150 MG 12 hr tablet, Take 150 mg by mouth, Disp: , Rfl:      calcium-vitamin D (OSCAL) 250-125 MG-UNIT TABS per tablet, Take 1 tablet by mouth 2 times daily, Disp: , Rfl:      Ferrous Sulfate (IRON) 325 (65 Fe) MG tablet, Take 325 mg by mouth, Disp: , Rfl:      fish oil-omega-3 fatty acids 1000 MG capsule, Take 1,000 mg by mouth, Disp: , Rfl:      Multiple Vitamin (MULTI-VITAMIN DAILY PO), Take 1 tablet by mouth, Disp: , Rfl:      naproxen sodium (ANAPROX) 220 MG tablet, Take 220 mg by mouth 2 times daily (with meals), Disp: , Rfl:      trimethoprim-polymyxin b (POLYTRIM) ophthalmic solution, Place 1 drop into both eyes 4 times daily., Disp: 1 Bottle, Rfl: 0  Immunization History   Administered Date(s) Administered     HepB 05/31/2011     MMR 05/31/2011     Mantoux Tuberculin Skin Test 05/31/2011     TDAP Vaccine (Adacel) 01/19/2011     Allergies   Allergen Reactions     Ativan Itching     Cephalosporins      itching     Dilaudid [Hydromorphone Hcl]      Itching     Hyoscyamine      itching     Oxycodone      Itching     Penicillins      itching     Sulfa Drugs      Rash and itching     Ultram [Tramadol]      itching     Vicodin [Acetaminophen]      itching         EXAM:   Constitutional:  Appears well-developed and well-nourished. No distress.   HEENT:   Head: Normocephalic.   No cobblestoning of posterior oropharynx.   Nasal tissue pink and normal appearing.  No rhinorrhea noted.    Eyes: Conjunctivae are non-erythematous   Cardiovascular: Normal rate, regular rhythm and normal heart sounds. Exam reveals no gallop and no friction rub.   No murmur heard.  Respiratory: Effort normal and breath sounds normal. No respiratory distress. No  wheezes. No rales.   Musculoskeletal: Normal range of motion.   Neuro: Oriented to person, place, and time.  Skin: Skin is warm and dry. No rash noted.   Psychiatric: Normal mood and affect.     Nursing note and vitals reviewed.      WORKUP:  ENVIRONMENTAL PERCUTANEOUS SKIN TESTING: ADULT  [facility] 9/9/2020   Consent Y   Ordering Physician Olga   Interpreting Physician Olga   Testing Technician Jackie DAVILA   Location Back   Time start:  7:59 AM   Time End:  8:14 AM   Positive Control: Histatrol*ALK 1 mg/ml 4/38   Negative Control: 50% Glycerin 0   Cat Hair*ALK (10,000 BAU/ml) 0   AP Dog Hair/Dander (1:100 w/v) 0   Dust Mite p. 30,000 AU/ml 10/50   Dust Mite f. (30,000 AU/ml) 5/40   Joe (W/F in millimeters) 0   Syed Grass (100,000 BAU/mL) 10/60   Red [place] (W/F in millimeters) 0   Maple/Bosque (W/F in millimeters) 0   Hackberry (W/F in millimeters) 0   [place] (W/F in millimeters) 0   [place] *ALK (W/F in millimeters) 0   American Elm (W/F in millimeters) 0   Griggs (W/F in millimeters) 0   Black [place] (W/F in millimeters) 0   Birch Mix (W/F in millimeters) 0   Huron (W/F in millimeters) 0   Oak (W/F in millimeters) 0   Cocklebur (W/F in millimeters) 0   Holbrook (W/F in millimeters) 0   White Phoenix (W/F in millimeters) 0   Careless (W/F in millimeters) 0   Nettle (W/F in millimeters) 0   English Plantain (W/F in millimeters) 0   Kochia (W/F in millimeters) 0   Lamb's Quarter (W/F in millimeters) 0   Marshelder (W/F in millimeters) 0   Ragweed Mix* ALK (W/F in millimeters) 0/20   Russian Thistle (W/F in millimeters) 0   Sagebrush/Mugwort (W/F in millimeters) 0   Sheep Sorrel (W/F in millimeters) 0   Feather Mix* ALK (W/F in millimeters) 0   Penicillium Mix (1:10 w/v) 0   Curvularia spicifera (1:10 w/v) 0   Epicoccum (1:10 w/v) 0   Aspergillus fumigatus (1:10 w/v): 0   Alternaria tenius (1:10 w/v) 0   H. Cladosporium (1:10 w/v) 0   Phoma herbarum (1:10 w/v) 0         ASSESSMENT/PLAN:  Problem List Items Addressed This Visit        Respiratory    Allergic rhinitis due to dust mite     Nasal and ocular symptoms in the spring and fall. Symptoms when travels to southern climates regardless of time of the year.    Allergy testing positive for dust mites and grass. Equivocal for ragweed.     - Allergen avoidance measures discussed and literature provided.   - Flonase 2 sprays/nostril daily.   - Zyrtec as needed.   - Consider allergy shots. Checking ragweed IgE and will include if she decides to start allergy shots.          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 Could premedicate if needed.     Rash after penicillin 10+ years ago.     Rash after cephalosporin.     Rash and vomiting after sulfa.     - Serum Tryptase.   - Return to clinic for penicillin testing.          Relevant Orders    Tryptase (Completed)          Chart documentation with Dragon Voice recognition Software. Although reviewed after completion, some words and grammatical errors may remain.    Ariel Espinoza DO FAAAAI  Medical Director for Allergy/Immunology at [place], [place]

## 2020-09-09 NOTE — PATIENT INSTRUCTIONS
Allergy Staff Appt Hours Shot Hours Locations    Physician     Ariel Espinoza DO       Support Staff     NARGIS Michaels, [facility]  Tuesday:        [place] 7-4:20     Wednesday:        [place]: 7-5 Thursday:                    [place] 7-6:40     Friday:  [place]  7-2:40   [place]        Thursday: 1-5:50        Friday: 7-10:50     [place]        Tuesday: 7- 3:20        Wednesday: 7-4:20     [place] Monday: 7-4:20        Tuesday: 1-6:20         [facility]  [address] [place] [address]  Appt Line: [phone]  Allergy RN:  [phone]    [facility]  [address] [place] [address]  Appt Line: [phone]  Allergy RN:  [phone]       Important Scheduling Information  Aspirin Desensitization: Appt will last 2 clinic days. Please call the Allergy RN line for your clinic to schedule. Discontinue antihistamines 7 days prior to the appointment.     Food Challenges: Appt will last 3-4 hours. Please call the Allergy RN line for your clinic to schedule. Discontinue antihistamines 7 days prior to the appointment.     Penicillin Testing: Appt will last 2-3 hours. Please call the Allergy RN line for your clinic to schedule. Discontinue antihistamines 7 days prior to the appointment.     Skin Testing: Appt will about 40 minutes. Call the appointment line for your clinic to schedule. Discontinue antihistamines 7 days prior to the appointment.     Venom Testing: Appt will last 2-3 hours. Please call the Allergy RN line for your clinic to schedule. Discontinue antihistamines 7 days prior to the appointment.     Thank you for trusting us with your Allergy, Asthma, and Immunology care. Please feel free to contact us with any questions or concerns you may have.      - Flonase 2 sprays/nostril daily.   - Zyrtec daily as needed.   - Return to clinic for penicillin testing.   - Blood testing today.   - Consider allergy shots.     AEROALLERGEN AVOIDANCE INSTRUCTIONS  POLLEN  Pollens  Pt RTC she cannot take Armor thyroid.  She can take either nature thyroid or WP.  Pt is ok with the nature thyroid.  Rx refilled.    are the tiny airborne particles given off by trees, weeds, and grasses. They can be the cause of seasonal allergic rhinitis or hay fever symptoms, which include stuffy, itchy, runny nose, redness, swelling and itching of the eyes, and itching of the ears and throat. Here are some tips on how to avoid pollen exposure.  1. .Keep windows closed and use the air conditioner when possible.  2.  Avoid outside exposure in the early morning as pollen counts are highest at that time.  3.  Take a shower and wash hair each night.  4.  Consider wearing a mask when working in the yard and/or garden.  5.  Clean furnace filter monthly with HEPA filters. Consider a HEPA filter vacuum  which will prevent pollen from being reintroduced into the air.   DUST MITES  Dust mites can never be entirely eliminated in the house no matter how clean your house is. Dust mites are attracted to warm, moist areas and feed on dead skin flakes. Here are tips to minimize dust mites in your home.  1.  Encase pillows and mattress/box springs in zippered allergy covers.  2.  Wash bedding in hot water (at least 130 F) every 7-14 days.  3.  Avoid curtains, carpet, and upholstered furniture if possible.  4.  Use HEPA air filters and a HEPA filter vacuum . Change filters monthly. Vacuum weekly.  5.  Keep bedroom simple, avoiding clutter, so it can quickly be dusted.  6.  Cover heating vents with vent filters.  7.  Keep stuffed toys in a closed container and wash or freeze regularly.  8.  Keep clothing in the closet with the door closed.    Allergy Immunotherapy CPT Codes     Rapid Immunotherapy - 73653  o Billed hourly  - Rush Immunotherapy (approximately 8 hours)  - Cluster Immunotherapy (approximately 10 hours)      Traditional Immunotherapy - 42999  o Also used after Rapid Immunotherapy is completed      Serums - 26964  o Billed per milliliter  - 1 serum = 20ml  - 2 serums = 40ml  - 3 serums = 60ml  - 4 serums = 80ml      [place] [facility]  Office Phone: [phone]

## 2020-09-09 NOTE — LETTER
9/9/2020         RE: Sun Andrade  [address]  [facility] [address]        Dear Colleague,    Thank you for referring your patient, Sun Anrdade, to the [facility]. Please see a copy of my visit note below.    Sun Andrade is a 45 year old White female with previous medical history significant for drug allergy, rhinoconjunctivitis. Sun Andrade is being seen today for evaluation of allergies to medication and seasonal allergies.     The patient reports that she has reacted over the years to numerous medications.  1 of which has been penicillin.  This was greater than 10 years ago.  She developed some sort of rash with this.  She does not recall how far into the antibiotic she was until she developed the rash.  She is subsequently avoided penicillin antibiotics.  She reports that with Keflex approximately 3 days into taking she has developed a rash as well.  With Bactrim recently she had rash and vomiting.  This was immediate.  With opioid medication she has had diffuse itching.  This even occurs if she touches an opioid medication.  No other systemic symptoms.    Patient also reports that she has had rhinorrhea, sneezing, postnasal drainage, ocular itching, ocular redness, ocular watering during the spring and fall.  She has symptoms if she travels south regardless of the time of the year to either [place], [place], [place] or [place].  No problems around cats or dogs.  Treatment with Flonase, Singulair and either Allegra or Zyrtec has been beneficial.  No history of allergy testing.  No problems around mowing grass or raking leaves.    The patient has no history of asthma, eczema, food allergies, or hives.     ENVIRONMENTAL HISTORY: [place] The home is heated with a forced air. They do not have central air conditioning. The patient's bedroom is furnished with carpeting in bedroom.  Pets inside the house include 2 cat(s). There is no  history of cockroach or mice infestation. There is/are 0 smokers in the house.  The house does not have a damp basement.     Past Medical History:   Diagnosis Date     Anxiety 2008     Attention deficit disorder of adult 2008     Depression 2008     Enlargement - tonsil/adenoid 2008     Itching 2008    in response to multiple medications-- see allergy list     Sleep apnea     mild; felt related to adenoids; no treatment     Family History   Problem Relation Age of Onset     Depression Mother      Breast Cancer Maternal Grandmother      Depression Maternal Grandmother      Diabetes Maternal Grandfather      C.A.D. Paternal Grandfather         CHF     Diabetes Paternal Grandmother      Asthma Daughter      Past Surgical History:   Procedure Laterality Date     C  DELIVERY ONLY       LAPAROSCOPY,TUBAL CAUTERY       SURGICAL HISTORY OF -   09    discectomy L5-S1 for herniated disc       REVIEW OF SYSTEMS:  General: negative for weight gain. negative for weight loss. negative for changes in sleep.   Ears: negative for fullness. negative for hearing loss. negative for dizziness.   Nose: negative for snoring.negative for changes in smell. positive  for drainage.   Eyes: positive  for eye watering. positive  for eye itching. negative for vision changes. negative for eye redness.  Throat: negative for hoarseness. negative for sore throat. negative for trouble swallowing.   Lungs: negative for shortness of breath.negative for wheezing. negative for sputum production.   Cardiovascular: negative for chest pain. negative for swelling of ankles. negative for fast or irregular heartbeat.   Gastrointestinal: negative for nausea. negative for heartburn. negative for acid reflux.   Musculoskeletal: positive  for joint pain. positive  for joint stiffness. negative for joint swelling.   Neurologic: negative for seizures. negative for fainting. negative for weakness.   Psychiatric: negative  for changes in mood. positive  for anxiety.   Endocrine: negative for cold intolerance. negative for heat intolerance. negative for tremors.   Lymphatic: negative for lower extremity swelling. negative for lymph node swelling.   Hematologic: negative for easy bruising. negative for easy bleeding.  Integumentary: negative for rash. negative for scaling. negative for nail changes.       Current Outpatient Medications:      Ascorbic Acid (VITAMIN C) 100 MG CHEW, , Disp: , Rfl:      buPROPion (WELLBUTRIN SR) 150 MG 12 hr tablet, Take 150 mg by mouth, Disp: , Rfl:      calcium-vitamin D (OSCAL) 250-125 MG-UNIT TABS per tablet, Take 1 tablet by mouth 2 times daily, Disp: , Rfl:      Ferrous Sulfate (IRON) 325 (65 Fe) MG tablet, Take 325 mg by mouth, Disp: , Rfl:      fish oil-omega-3 fatty acids 1000 MG capsule, Take 1,000 mg by mouth, Disp: , Rfl:      Multiple Vitamin (MULTI-VITAMIN DAILY PO), Take 1 tablet by mouth, Disp: , Rfl:      naproxen sodium (ANAPROX) 220 MG tablet, Take 220 mg by mouth 2 times daily (with meals), Disp: , Rfl:      trimethoprim-polymyxin b (POLYTRIM) ophthalmic solution, Place 1 drop into both eyes 4 times daily., Disp: 1 Bottle, Rfl: 0  Immunization History   Administered Date(s) Administered     HepB 05/31/2011     MMR 05/31/2011     Mantoux Tuberculin Skin Test 05/31/2011     TDAP Vaccine (Adacel) 01/19/2011     Allergies   Allergen Reactions     Ativan Itching     Cephalosporins      itching     Dilaudid [Hydromorphone Hcl]      Itching     Hyoscyamine      itching     Oxycodone      Itching     Penicillins      itching     Sulfa Drugs      Rash and itching     Ultram [Tramadol]      itching     Vicodin [Acetaminophen]      itching         EXAM:   Constitutional:  Appears well-developed and well-nourished. No distress.   HEENT:   Head: Normocephalic.   No cobblestoning of posterior oropharynx.   Nasal tissue pink and normal appearing.  No rhinorrhea noted.    Eyes: Conjunctivae are  non-erythematous   Cardiovascular: Normal rate, regular rhythm and normal heart sounds. Exam reveals no gallop and no friction rub.   No murmur heard.  Respiratory: Effort normal and breath sounds normal. No respiratory distress. No wheezes. No rales.   Musculoskeletal: Normal range of motion.   Neuro: Oriented to person, place, and time.  Skin: Skin is warm and dry. No rash noted.   Psychiatric: Normal mood and affect.     Nursing note and vitals reviewed.      WORKUP:  ENVIRONMENTAL PERCUTANEOUS SKIN TESTING: ADULT  [facility] 9/9/2020   Consent Y   Ordering Physician Olga   Interpreting Physician Olga   Testing Technician Jackie DAVILA   Location Back   Time start:  7:59 AM   Time End:  8:14 AM   Positive Control: Histatrol*ALK 1 mg/ml 4/38   Negative Control: 50% Glycerin 0   Cat Hair*ALK (10,000 BAU/ml) 0   AP Dog Hair/Dander (1:100 w/v) 0   Dust Mite p. 30,000 AU/ml 10/50   Dust Mite f. (30,000 AU/ml) 5/40   Joe (W/F in millimeters) 0   Syed Grass (100,000 BAU/mL) 10/60   Red Benzie (W/F in millimeters) 0   Maple/Houston (W/F in millimeters) 0   Hackberry (W/F in millimeters) 0   Mesa (W/F in millimeters) 0   Selden *ALK (W/F in millimeters) 0   American Elm (W/F in millimeters) 0   Massac (W/F in millimeters) 0   Black Boynton (W/F in millimeters) 0   Birch Mix (W/F in millimeters) 0   Greenup (W/F in millimeters) 0   Oak (W/F in millimeters) 0   Cocklebur (W/F in millimeters) 0   Camarillo (W/F in millimeters) 0   White Phoenix (W/F in millimeters) 0   Careless (W/F in millimeters) 0   Nettle (W/F in millimeters) 0   English Plantain (W/F in millimeters) 0   Kochia (W/F in millimeters) 0   Lamb's Quarter (W/F in millimeters) 0   Marshelder (W/F in millimeters) 0   Ragweed Mix* ALK (W/F in millimeters) 0/20   Russian Thistle (W/F in millimeters) 0   Sagebrush/Mugwort (W/F in millimeters) 0   Sheep Sorrel (W/F in millimeters) 0   Feather Mix* ALK (W/F in millimeters) 0   Penicillium Mix  (1:10 w/v) 0   Curvularia spicifera (1:10 w/v) 0   Epicoccum (1:10 w/v) 0   Aspergillus fumigatus (1:10 w/v): 0   Alternaria tenius (1:10 w/v) 0   H. Cladosporium (1:10 w/v) 0   Phoma herbarum (1:10 w/v) 0        ASSESSMENT/PLAN:  Problem List Items Addressed This Visit        Respiratory    Allergic rhinitis due to dust mite     Nasal and ocular symptoms in the spring and fall. Symptoms when travels to southern clim[place] regardless of time of the year.    Allergy testing positive for dust mites and grass. Equivocal for ragweed.     - Allergen avoidance measures discussed and literature provided.   - Flonase 2 sprays/nostril daily.   - Zyrtec as needed.   - Consider allergy shots. Checking ragweed IgE and will include if she decides to start allergy shots.          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 Could premedicate if needed.     Rash after penicillin 10+ years ago.     Rash after cephalosporin.     Rash and vomiting after sulfa.     - Serum Tryptase.   - Return to clinic for penicillin testing.          Relevant Orders    Tryptase (Completed)          Chart documentation with Dragon Voice recognition Software. Although reviewed after completion, some words and grammatical errors may remain.    Ariel Espinoza DO FAAAAI  Medical Director for Allergy/Immunology at [place], [place]      Again, thank you for allowing me to participate in the care of your patient.        Sincerely,        Ariel Espinoza DO

## 2020-09-09 NOTE — ASSESSMENT & PLAN NOTE
Itching after opioids which is likely due to direct mast cell degranulation as opposed to true drug allergy. Could premedicate if needed.     Rash after penicillin 10+ years ago.     Rash after cephalosporin.     Rash and vomiting after sulfa.     - Serum Tryptase.   - Return to clinic for penicillin testing.

## 2020-09-11 LAB
COMMON RAGWEED IGE QN: 0.21 KU(A)/L
GIANT RAGWEED IGE QN: 0.12 KU(A)/L
TRYPTASE SERPL-MCNC: 3.7 UG/L

## 2020-09-11 NOTE — RESULT ENCOUNTER NOTE
Ragweed did come back as positive.  This could cause fall symptoms.  Would include if he decided to do allergy shots. Let us know if you want to start. Serum tryptase was negative.    Thanks Dr. Espinoza

## 2020-09-28 ENCOUNTER — TELEPHONE (OUTPATIENT)
Dept: ALLERGY | Facility: OTHER | Age: 45
End: 2020-09-28

## 2020-09-28 DIAGNOSIS — Z51.6 NEED FOR DESENSITIZATION TO ALLERGENS: Primary | ICD-10-CM

## 2020-09-28 DIAGNOSIS — J30.1 SEASONAL ALLERGIC RHINITIS DUE TO POLLEN: ICD-10-CM

## 2020-09-28 NOTE — TELEPHONE ENCOUNTER
Reason for Call:  Other appointment    Detailed comments: Patient saw Dr Espinoza on 9/9/20 and was told she could do 8 hour allergy injections. Please call patient with information on scheduling.     Phone Number Patient can be reached at: Home number on file [phone] (home)    Best Time: any    Can we leave a detailed message on this number? YES    Call taken on 9/28/2020 at 1:04 PM by Vinicio Orourke

## 2020-09-29 RX ORDER — PREDNISONE 10 MG/1
TABLET ORAL
Qty: 12 TABLET | Refills: 0 | Status: SHIPPED | OUTPATIENT
Start: 2020-09-29 | End: 2020-12-08

## 2020-09-29 RX ORDER — EPINEPHRINE 0.3 MG/.3ML
0.3 INJECTION SUBCUTANEOUS PRN
Qty: 2 EACH | Refills: 1 | Status: SHIPPED | OUTPATIENT
Start: 2020-09-29 | End: 2021-07-20

## 2020-09-29 RX ORDER — MONTELUKAST SODIUM 10 MG/1
TABLET ORAL
Qty: 3 TABLET | Refills: 0 | Status: SHIPPED | OUTPATIENT
Start: 2020-09-29 | End: 2021-07-20

## 2020-09-29 NOTE — TELEPHONE ENCOUNTER
Patient contacted to begin immunotherapy injections.  Verbal consent was obtained over the phone.  Patient understands that he/she is responsible for any remaining balance for allergy serum after it has been submitted to insurance.    Patient will be doing rush followed by cluster immunotherapy.  First appointment scheduled for 11/11/20 at [facility].     Reviewed new patient instructions including:     Accelerated Immunotherapy:  - Bring epinephrine auto-injector to every appointment.  - Please follow pre-medication instructions as directed by provider.  Instructions provided to patient verbally and via [employer].  - Patient will be at clinic for up to 2 hours (cluster immunotherapy) or 8 hours (rush immunotherapy).  Please bring items to occupy your time at the clinic, as you will not be able to leave clinic once the procedure has begun.     Return number of allergy shot clinic and main line given for any questions or concerns.    Jackie Louis RN

## 2020-09-29 NOTE — TELEPHONE ENCOUNTER
ALLERGY SOLUTION NEW REQUEST    Sun Andrade 1975 MRN: [id number]    DATE NEEDED:  11/2020  Vial Color Content   Top Dose         Vial Size  Green 1:1,000, Blue 1:100, Yellow 1:10 and Red 1:1 Grass, Dust Mite, Weeds  Red 1:1 0.5 5mL      Shot Clinic Location:  [facility]  Ship to Location: [facility]  Special Instructions:  Do not need until november        Requester Signature  Ariel Espinoza, DO

## 2020-09-29 NOTE — TELEPHONE ENCOUNTER
Patient starting rush IT on 11/11/20 at [facility].  Please send premedication ([facility]), AuviQ and serums.  Thank you.    Jackie Louis RN

## 2020-10-14 DIAGNOSIS — J30.2 SEASONAL ALLERGIC RHINITIS: Primary | ICD-10-CM

## 2020-10-14 PROCEDURE — 95165 ANTIGEN THERAPY SERVICES: CPT | Performed by: ALLERGY & IMMUNOLOGY

## 2020-10-14 NOTE — PROGRESS NOTES
Allergy serums billed at [place].     Vials billed below:    Vial Color Content                      Vial Size Expiration Date  Green 1:1,000 Grass, Dust Mite, Weeds 5mL  4/12/2021  Blue 1:100 Grass, Dust Mite, Weeds 5mL  10/12/2021  Yellow 1:10 Grass, Dust Mite, Weeds 5mL  10/12/2021  Red 1:1 Grass, Dust Mite, Weeds 5mL  10/12/2021    Original Refill encounter date: 9/9/2020      Signature  Tiara Etienne MA, CMA ......10/14/2020...11:41 AM

## 2020-10-14 NOTE — TELEPHONE ENCOUNTER
Allergy serums received at [place].     Vials received below:    Vial Color Content                      Vial Size Expiration Date  Green 1:1,000 Grass, Dust Mite, Weeds 5mL  4/12/2021  Blue 1:100 Grass, Dust Mite, Weeds 5mL  10/12/2021  Yellow 1:10 Grass, Dust Mite, Weeds 5mL  10/12/2021  Red 1:1 Grass, Dust Mite, Weeds 5mL  10/12/2021      Odette Etienne MA, CMA ......10/14/2020...11:39 AM

## 2020-11-11 ENCOUNTER — OFFICE VISIT (OUTPATIENT)
Dept: ALLERGY | Facility: OTHER | Age: 45
End: 2020-11-11
Payer: COMMERCIAL

## 2020-11-11 VITALS
SYSTOLIC BLOOD PRESSURE: 116 MMHG | WEIGHT: 191 LBS | DIASTOLIC BLOOD PRESSURE: 78 MMHG | TEMPERATURE: 97.8 F | OXYGEN SATURATION: 97 % | HEART RATE: 82 BPM | HEIGHT: 65 IN | BODY MASS INDEX: 31.82 KG/M2

## 2020-11-11 DIAGNOSIS — J30.1 SEASONAL ALLERGIC RHINITIS DUE TO POLLEN: Primary | ICD-10-CM

## 2020-11-11 DIAGNOSIS — J30.89 ALLERGIC RHINITIS DUE TO DUST MITE: ICD-10-CM

## 2020-11-11 PROCEDURE — 99207 PR NO CHARGE LOS: CPT | Performed by: ALLERGY & IMMUNOLOGY

## 2020-11-11 PROCEDURE — 95180 RAPID DESENSITIZATION: CPT | Performed by: ALLERGY & IMMUNOLOGY

## 2020-11-11 RX ORDER — CETIRIZINE HYDROCHLORIDE 10 MG/1
20 TABLET ORAL ONCE
Status: COMPLETED | OUTPATIENT
Start: 2020-11-11 | End: 2020-11-11

## 2020-11-11 RX ADMIN — CETIRIZINE HYDROCHLORIDE 20 MG: 10 TABLET ORAL at 07:30

## 2020-11-11 ASSESSMENT — MIFFLIN-ST. JEOR: SCORE: 1503.28

## 2020-11-11 NOTE — PROGRESS NOTES
Sun Andrade is a 45 year old White female with previous medical history significant for allergic rhinitis who returns for a follow up visit.    Patient presents today for cluster immunotherapy. The patient is currently in a good state of health. No recent fevers, chills, cough, wheezing, shortness of breath, skin rash, angioedema, nausea, vomiting or diarrhea. The risks and benefits were discussed and the patient/patient's family wishes to proceed. The consent was signed.    Past Medical History:   Diagnosis Date     Anxiety 2008     Attention deficit disorder of adult 2008     Depression 2008     Enlargement - tonsil/adenoid 2008     Itching 2008    in response to multiple medications-- see allergy list     Sleep apnea     mild; felt related to adenoids; no treatment     Family History   Problem Relation Age of Onset     Depression Mother      Breast Cancer Maternal Grandmother      Depression Maternal Grandmother      Diabetes Maternal Grandfather      C.A.D. Paternal Grandfather         CHF     Diabetes Paternal Grandmother      Asthma Daughter      Past Surgical History:   Procedure Laterality Date     C  DELIVERY ONLY       LAPAROSCOPY,TUBAL CAUTERY       SURGICAL HISTORY OF -   09    discectomy L5-S1 for herniated disc       REVIEW OF SYSTEMS:  General: negative for weight gain. negative for weight loss. negative for changes in sleep.   Ears: negative for fullness. negative for hearing loss. negative for dizziness.   Nose: negative for snoring.negative for changes in smell. negative for drainage.   Eyes: negative for eye watering. negative for eye itching. negative for vision changes. negative for eye redness.  Throat: negative for hoarseness. negative for sore throat. negative for trouble swallowing.   Lungs: negative for shortness of breath.negative for wheezing. negative for sputum production.   Cardiovascular: negative for chest pain. negative for  swelling of ankles. negative for fast or irregular heartbeat.   Gastrointestinal: negative for nausea. negative for heartburn. negative for acid reflux.   Musculoskeletal: negative for joint pain. negative for joint stiffness. negative for joint swelling.   Neurologic: negative for seizures. negative for fainting. negative for weakness.   Psychiatric: negative for changes in mood. negative for anxiety.   Endocrine: negative for cold intolerance. negative for heat intolerance. negative for tremors.   Lymphatic: negative for lower extremity swelling. negative for lymph node swelling.   Hematologic: negative for easy bruising. negative for easy bleeding.  Integumentary: negative for rash. negative for scaling. negative for nail changes.       Current Outpatient Medications:      Ascorbic Acid (VITAMIN C) 100 MG CHEW, , Disp: , Rfl:      buPROPion (WELLBUTRIN SR) 150 MG 12 hr tablet, Take 150 mg by mouth, Disp: , Rfl:      calcium-vitamin D (OSCAL) 250-125 MG-UNIT TABS per tablet, Take 1 tablet by mouth 2 times daily, Disp: , Rfl:      EPINEPHrine (AUVI-Q) 0.3 MG/0.3ML injection 2-pack, Inject 0.3 mLs (0.3 mg) into the muscle as needed for anaphylaxis, Disp: 2 each, Rfl: 1     Ferrous Sulfate (IRON) 325 (65 Fe) MG tablet, Take 325 mg by mouth, Disp: , Rfl:      fish oil-omega-3 fatty acids 1000 MG capsule, Take 1,000 mg by mouth, Disp: , Rfl:      montelukast (SINGULAIR) 10 MG tablet, 10mg daily beginning two days prior to rush immunotherapy and take the morning of rush immunotherapy., Disp: 3 tablet, Rfl: 0     Multiple Vitamin (MULTI-VITAMIN DAILY PO), Take 1 tablet by mouth, Disp: , Rfl:      naproxen sodium (ANAPROX) 220 MG tablet, Take 220 mg by mouth 2 times daily (with meals), Disp: , Rfl:      ORDER FOR ALLERGEN IMMUNOTHERAPY, Dust Mites DF. 10,000 AU/mL, HS  0.5 ml Dust Mites DP. 10,000 AU/mL, HS  0.5 ml  Syed Grass (Std) 100,000 BAU/mL, HS 0.4 ml Ragweed, Mix (Giant, Short) 1:20 w/v, HS 0.4 ml Diluent: HSA  qs to 5ml, Disp: 5 mL, Rfl: prn     predniSONE (DELTASONE) 10 MG tablet, Prednisone 20mg PO bid starting 2 days before rush and 40mg PO qam the morning prior to rush., Disp: 12 tablet, Rfl: 0     trimethoprim-polymyxin b (POLYTRIM) ophthalmic solution, Place 1 drop into both eyes 4 times daily., Disp: 1 Bottle, Rfl: 0  No current facility-administered medications for this visit.   Immunization History   Administered Date(s) Administered     HepB 05/31/2011     MMR 05/31/2011     Mantoux Tuberculin Skin Test 05/31/2011     TDAP Vaccine (Adacel) 01/19/2011     Allergies   Allergen Reactions     Ativan Itching     Cephalosporins      itching     Dilaudid [Hydromorphone Hcl]      Itching     Hyoscyamine      itching     Oxycodone      Itching     Penicillins      itching     Sulfa Drugs      Rash and itching     Ultram [Tramadol]      itching     Vicodin [Acetaminophen]      itching         EXAM:   Constitutional:  Appears well-developed and well-nourished. No distress.   HEENT:   Head: Normocephalic.   Nasal tissue pink and normal appearing.  No rhinorrhea noted.    Eyes: Conjunctivae are non-erythematous   Cardiovascular: Normal rate, regular rhythm and normal heart sounds. Exam reveals no gallop and no friction rub.   No murmur heard.  Respiratory: Effort normal and breath sounds normal. No respiratory distress. No wheezes. No rales.   Musculoskeletal: Normal range of motion.   Neuro: Oriented to person, place, and time.  Skin: Skin is warm and dry. No rash noted.   Psychiatric: Normal mood and affect.     Nursing note and vitals reviewed.    ASSESSMENT/PLAN:  Problem List Items Addressed This Visit        Respiratory    Allergic rhinitis due to dust mite     Nasal and ocular symptoms in the spring and fall. Symptoms when travels to [place] regardless of time of the year.     Allergy testing positive for dust mites and grass. Equivocal for ragweed. Positive for ragweed on blood testing.     Cluster  immunotherapy   The patient received green 0.1, green 0.4 and blue 0.1ml today. Each dose was  by 30 minutes. Full report will be scanned into electronic medical record. The pateint was in office for over 1.5 hours.       - Allergen avoidance measures discussed and literature provided.   - Flonase 2 sprays/nostril daily.   - Zyrtec as needed.   - Continue allergy shots.          Relevant Medications    cetirizine (zyrTEC) tablet 20 mg (Completed)    Other Relevant Orders    RAPID DESENSITIZATION (Completed)    Seasonal allergic rhinitis due to pollen - Primary    Relevant Medications    cetirizine (zyrTEC) tablet 20 mg (Completed)    Other Relevant Orders    RAPID DESENSITIZATION (Completed)          Chart documentation with Dragon Voice recognition Software. Although reviewed after completion, some words and grammatical errors may remain.    Ariel Espinoza DO FAAAAI  Medical Director for Allergy/Immunology at [place], [place]

## 2020-11-11 NOTE — LETTER
11/11/2020         RE: Sun Andrade  [address]  [facility] [address]        Dear Colleague,    Thank you for referring your patient, Sun Andrade, to the [facility]. Please see a copy of my visit note below.    Cluster Allergen Immunotherapy:    After explaining risks and benefits, and obtaining verbal and written consent, we proceeded with cluster immunotherapy.     VISIT  VIAL COLOR/STRENGTH  DOSES TO BE GIVEN    1  GREEN (1:1000), BLUE (1:100)  GREEN 0.1, GREEN 0.4, BLUE 0.1    2  BLUE (1:100), YELLOW (1:10)  BLUE 0.2, BLUE 0.4, YELLOW 0.05    3  YELLOW (1:10)  YELLOW 0.1, YELLOW 0.15, YELLOW 0.25    4  YELLOW (1:10)  YELLOW 0.35, YELLOW 0.5    5  RED (1:1)  RED 0.05, RED 0.1    6  RED (1:1)  RED 0.15, RED 0.2    7  RED (1:1)  RED 0.3, RED 0.4    8  RED (1:1)  RED 0.5        VISIT 1    Time Injection Given: 818  Green 1:1,000   Grass, Dust Mite, Weeds  0.1 mL      Time Injection Given: 854  Green 1:1,000   Grass, Dust Mite, Weeds  0.4 mL      Time Injection Given: 930  Blue 1:100   Grass, Dust Mite, Weeds  0.1 mL        Start Time: 818  End Time: 1004      VITALS   Time BP Pulse pOx Reaction Treatment   852 110/70 72 98 - -   927 108/70 70 98 - -   1004 106/70 68 97 - -     Per provider verbal order, the following medication was given at 7:30am as a premedication to cluster IT:     MEDICATION:Cetirizine  ROUTE: PO  SITE: Medication was given orally   DOSE: 20MG  LOT #: [id number]  :  Major  EXPIRATION DATE:  4/31/21  NDC#: 9830-2343-75     Jackie Louis RN      Sun Andrade is a 45 year old White female with previous medical history significant for allergic rhinitis who returns for a follow up visit.    Patient presents today for cluster immunotherapy. The patient is currently in a good state of health. No recent fevers, chills, cough, wheezing, shortness of breath, skin rash, angioedema, nausea, vomiting or diarrhea. The risks and benefits were discussed and  the patient/patient's family wishes to proceed. The consent was signed.    Past Medical History:   Diagnosis Date     Anxiety 2008     Attention deficit disorder of adult 2008     Depression 2008     Enlargement - tonsil/adenoid 2008     Itching 2008    in response to multiple medications-- see allergy list     Sleep apnea     mild; felt related to adenoids; no treatment     Family History   Problem Relation Age of Onset     Depression Mother      Breast Cancer Maternal Grandmother      Depression Maternal Grandmother      Diabetes Maternal Grandfather      C.A.D. Paternal Grandfather         CHF     Diabetes Paternal Grandmother      Asthma Daughter      Past Surgical History:   Procedure Laterality Date     C  DELIVERY ONLY       LAPAROSCOPY,TUBAL CAUTERY       SURGICAL HISTORY OF -   09    discectomy L5-S1 for herniated disc       REVIEW OF SYSTEMS:  General: negative for weight gain. negative for weight loss. negative for changes in sleep.   Ears: negative for fullness. negative for hearing loss. negative for dizziness.   Nose: negative for snoring.negative for changes in smell. negative for drainage.   Eyes: negative for eye watering. negative for eye itching. negative for vision changes. negative for eye redness.  Throat: negative for hoarseness. negative for sore throat. negative for trouble swallowing.   Lungs: negative for shortness of breath.negative for wheezing. negative for sputum production.   Cardiovascular: negative for chest pain. negative for swelling of ankles. negative for fast or irregular heartbeat.   Gastrointestinal: negative for nausea. negative for heartburn. negative for acid reflux.   Musculoskeletal: negative for joint pain. negative for joint stiffness. negative for joint swelling.   Neurologic: negative for seizures. negative for fainting. negative for weakness.   Psychiatric: negative for changes in mood. negative for anxiety.    Endocrine: negative for cold intolerance. negative for heat intolerance. negative for tremors.   Lymphatic: negative for lower extremity swelling. negative for lymph node swelling.   Hematologic: negative for easy bruising. negative for easy bleeding.  Integumentary: negative for rash. negative for scaling. negative for nail changes.       Current Outpatient Medications:      Ascorbic Acid (VITAMIN C) 100 MG CHEW, , Disp: , Rfl:      buPROPion (WELLBUTRIN SR) 150 MG 12 hr tablet, Take 150 mg by mouth, Disp: , Rfl:      calcium-vitamin D (OSCAL) 250-125 MG-UNIT TABS per tablet, Take 1 tablet by mouth 2 times daily, Disp: , Rfl:      EPINEPHrine (AUVI-Q) 0.3 MG/0.3ML injection 2-pack, Inject 0.3 mLs (0.3 mg) into the muscle as needed for anaphylaxis, Disp: 2 each, Rfl: 1     Ferrous Sulfate (IRON) 325 (65 Fe) MG tablet, Take 325 mg by mouth, Disp: , Rfl:      fish oil-omega-3 fatty acids 1000 MG capsule, Take 1,000 mg by mouth, Disp: , Rfl:      montelukast (SINGULAIR) 10 MG tablet, 10mg daily beginning two days prior to rush immunotherapy and take the morning of rush immunotherapy., Disp: 3 tablet, Rfl: 0     Multiple Vitamin (MULTI-VITAMIN DAILY PO), Take 1 tablet by mouth, Disp: , Rfl:      naproxen sodium (ANAPROX) 220 MG tablet, Take 220 mg by mouth 2 times daily (with meals), Disp: , Rfl:      ORDER FOR ALLERGEN IMMUNOTHERAPY, Dust Mites DF. 10,000 AU/mL, HS  0.5 ml Dust Mites DP. 10,000 AU/mL, HS  0.5 ml  Syed Grass (Std) 100,000 BAU/mL, HS 0.4 ml Ragweed, Mix (Giant, Short) 1:20 w/v, HS 0.4 ml Diluent: HSA qs to 5ml, Disp: 5 mL, Rfl: prn     predniSONE (DELTASONE) 10 MG tablet, Prednisone 20mg PO bid starting 2 days before rush and 40mg PO qam the morning prior to rush., Disp: 12 tablet, Rfl: 0     trimethoprim-polymyxin b (POLYTRIM) ophthalmic solution, Place 1 drop into both eyes 4 times daily., Disp: 1 Bottle, Rfl: 0  No current facility-administered medications for this visit.   Immunization History    Administered Date(s) Administered     HepB 05/31/2011     MMR 05/31/2011     Mantoux Tuberculin Skin Test 05/31/2011     TDAP Vaccine (Adacel) 01/19/2011     Allergies   Allergen Reactions     Ativan Itching     Cephalosporins      itching     Dilaudid [Hydromorphone Hcl]      Itching     Hyoscyamine      itching     Oxycodone      Itching     Penicillins      itching     Sulfa Drugs      Rash and itching     Ultram [Tramadol]      itching     Vicodin [Acetaminophen]      itching         EXAM:   Constitutional:  Appears well-developed and well-nourished. No distress.   HEENT:   Head: Normocephalic.   Nasal tissue pink and normal appearing.  No rhinorrhea noted.    Eyes: Conjunctivae are non-erythematous   Cardiovascular: Normal rate, regular rhythm and normal heart sounds. Exam reveals no gallop and no friction rub.   No murmur heard.  Respiratory: Effort normal and breath sounds normal. No respiratory distress. No wheezes. No rales.   Musculoskeletal: Normal range of motion.   Neuro: Oriented to person, place, and time.  Skin: Skin is warm and dry. No rash noted.   Psychiatric: Normal mood and affect.     Nursing note and vitals reviewed.    ASSESSMENT/PLAN:  Problem List Items Addressed This Visit        Respiratory    Allergic rhinitis due to dust mite     Nasal and ocular symptoms in the spring and fall. Symptoms when travels to [place] clim[place] regardless of time of the year.     Allergy testing positive for dust mites and grass. Equivocal for ragweed. Positive for ragweed on blood testing.     Cluster immunotherapy   The patient received green 0.1, green 0.4 and blue 0.1ml today. Each dose was  by 30 minutes. Full report will be scanned into electronic medical record. The pateint was in office for over 1.5 hours.       - Allergen avoidance measures discussed and literature provided.   - Flonase 2 sprays/nostril daily.   - Zyrtec as needed.   - Continue allergy shots.          Relevant Medications     cetirizine (zyrTEC) tablet 20 mg (Completed)    Other Relevant Orders    RAPID DESENSITIZATION (Completed)    Seasonal allergic rhinitis due to pollen - Primary    Relevant Medications    cetirizine (zyrTEC) tablet 20 mg (Completed)    Other Relevant Orders    RAPID DESENSITIZATION (Completed)          Chart documentation with Dragon Voice recognition Software. Although reviewed after completion, some words and grammatical errors may remain.    Ariel Espinoza DO FAAAAI  Medical Director for Allergy/Immunology at [facility] and [place], [place]        Again, thank you for allowing me to participate in the care of your patient.        Sincerely,        Ariel Espinoza DO

## 2020-11-11 NOTE — PROGRESS NOTES
Cluster Allergen Immunotherapy:    After explaining risks and benefits, and obtaining verbal and written consent, we proceeded with cluster immunotherapy.     VISIT  VIAL COLOR/STRENGTH  DOSES TO BE GIVEN    1  GREEN (1:1000), BLUE (1:100)  GREEN 0.1, GREEN 0.4, BLUE 0.1    2  BLUE (1:100), YELLOW (1:10)  BLUE 0.2, BLUE 0.4, YELLOW 0.05    3  YELLOW (1:10)  YELLOW 0.1, YELLOW 0.15, YELLOW 0.25    4  YELLOW (1:10)  YELLOW 0.35, YELLOW 0.5    5  RED (1:1)  RED 0.05, RED 0.1    6  RED (1:1)  RED 0.15, RED 0.2    7  RED (1:1)  RED 0.3, RED 0.4    8  RED (1:1)  RED 0.5        VISIT 1    Time Injection Given: 818  Green 1:1,000   Grass, Dust Mite, Weeds  0.1 mL      Time Injection Given: 854  Green 1:1,000   Grass, Dust Mite, Weeds  0.4 mL      Time Injection Given: 930  Blue 1:100   Grass, Dust Mite, Weeds  0.1 mL        Start Time: 818  End Time: 1004      VITALS   Time BP Pulse pOx Reaction Treatment   852 110/70 72 98 - -   927 108/70 70 98 - -   1004 106/70 68 97 - -     Per provider verbal order, the following medication was given at 7:30am as a premedication to cluster IT:     MEDICATION:Cetirizine  ROUTE: PO  SITE: Medication was given orally   DOSE: 20MG  LOT #: [id number]  :  Major  EXPIRATION DATE:  4/31/21  NDC#: 7845-1501-15     Jackie Louis RN

## 2020-11-11 NOTE — ASSESSMENT & PLAN NOTE
Nasal and ocular symptoms in the spring and fall. Symptoms when travels to southern clim[place] regardless of time of the year.     Allergy testing positive for dust mites and grass. Equivocal for ragweed. Positive for ragweed on blood testing.     Cluster immunotherapy   The patient received green 0.1, green 0.4 and blue 0.1ml today. Each dose was  by 30 minutes. Full report will be scanned into electronic medical record. The pateint was in office for over 1.5 hours.       - Allergen avoidance measures discussed and literature provided.   - Flonase 2 sprays/nostril daily.   - Zyrtec as needed.   - Continue allergy shots.

## 2020-11-11 NOTE — PATIENT INSTRUCTIONS
Allergy Staff Appt Hours Shot Hours Locations    Physician     Ariel Espinoza DO       Support Staff     NARGIS Micahels, BEKAH  Tuesday:        [place] 7-4:20     Wednesday:        [place]: 7-5     Thursday:                    [place] 7-6:40     Friday:  [place]  7-2:40   [place]        Thursday: 1-5:50        Friday: 7-10:50     [place]        Tuesday: 7- 3:20        Wednesday: 7-4:20     [place] Monday: 7-4:20        Tuesday: 1-6:20         [facility]  [address] [place] [address]  Appt Line: [phone]  Allergy RN:  [phone]    [facility]  [address] [place] [address]  Appt Line: [phone]  Allergy RN:  [phone]       Important Scheduling Information  Aspirin Desensitization: Appt will last 2 clinic days. Please call the Allergy RN line for your clinic to schedule. Discontinue antihistamines 7 days prior to the appointment.     Food Challenges: Appt will last 3-4 hours. Please call the Allergy RN line for your clinic to schedule. Discontinue antihistamines 7 days prior to the appointment.     Penicillin Testing: Appt will last 2-3 hours. Please call the Allergy RN line for your clinic to schedule. Discontinue antihistamines 7 days prior to the appointment.     Skin Testing: Appt will about 40 minutes. Call the appointment line for your clinic to schedule. Discontinue antihistamines 7 days prior to the appointment.     Venom Testing: Appt will last 2-3 hours. Please call the Allergy RN line for your clinic to schedule. Discontinue antihistamines 7 days prior to the appointment.     Thank you for trusting us with your Allergy, Asthma, and Immunology care. Please feel free to contact us with any questions or concerns you may have.

## 2020-11-13 NOTE — PROGRESS NOTES
Cluster Allergen Immunotherapy:    After explaining risks and benefits, and obtaining verbal and written consent, we proceeded with cluster immunotherapy.     VISIT  VIAL COLOR/STRENGTH  DOSES TO BE GIVEN    1  GREEN (1:1000), BLUE (1:100)  GREEN 0.1, GREEN 0.4, BLUE 0.1    2  BLUE (1:100), YELLOW (1:10)  BLUE 0.2, BLUE 0.4, YELLOW 0.05    3  YELLOW (1:10)  YELLOW 0.1, YELLOW 0.15, YELLOW 0.25    4  YELLOW (1:10)  YELLOW 0.35, YELLOW 0.5    5  RED (1:1)  RED 0.05, RED 0.1    6  RED (1:1)  RED 0.15, RED 0.2    7  RED (1:1)  RED 0.3, RED 0.4    8  RED (1:1)  RED 0.5        VISIT 2    After explaining risks and benefits, and obtaining verbal and written consent, we proceeded with cluster immunotherapy.    Time Injection Given: 724  Blue 1:100   Grass, Dust Mite, Weeds  0.2 mL      Time Injection Given: 759  Blue 1:100   Grass, Dust Mite, Weeds  0.4 mL      Time Injection Given: 834  Yellow 1:10   Grass, Dust Mite, Weeds  0.05 mL        Start Time: 724  End Time: 1130      VITALS   Time BP Pulse pOx Reaction Treatment   755 104/68 71 98 - -   831 106/64 79 98 Congestion, flushing, cough EpiPen @902  Cetirizine @902   903 144/88 78 99  -   908 121/78 85 98  -   915 126/85 75 99  -   923 130/79 70 100  -   928 122/80 70 98  -   945 122/76 76 100     1001 105/67 79 100     1024 115/74 75 100     1059 123/70 71 100       SYSTEMIC REACTION/ ANAPHYLAXIS TREATMENT RECORD    Prior systemic reaction: No    History of asthma: No    Provider responding to medical emergency:  Olga      RECORD OF CURRENT REACTION. DID YOU:             Assess airway and breathing? Yes            Administer IM epinephrine? Yes            Activate EMS (call 911 or local rescue team)? No            Review Protocol Yes      SYMPTOMS             SKIN: Flushing             RESPIRATORY: Cough            EYE/ NASAL: Congestion      MEDICATIONS ADMINISTERED        Time Medication Dose Route   902 EpiPen 0.3mg IM   902 cetirizine 20mg PO     Per provider  verbal order, the following medication was given:     MEDICATION:Epinephrine   ROUTE: IM  SITE: Thigh - Right  DOSE: 0.3mg  LOT #: [id number]  :  Mylan  EXPIRATION DATE:  2/28/21  NDC#: 01441-868-26     MEDICATION:Cetirizine  ROUTE: PO  SITE: Medication was given orally   DOSE: 20mg   LOT #: [id number]  :  Major  EXPIRATION DATE:  4/30/21  NDC#: 9683-4142-94       Time of discharge:    11:30am    Condition upon release:    stable    Patient instructions:     Will back up dose for cluster.  Follow anaphylaxis action plan if needed.

## 2020-11-17 ENCOUNTER — OFFICE VISIT (OUTPATIENT)
Dept: ALLERGY | Facility: OTHER | Age: 45
End: 2020-11-17
Payer: COMMERCIAL

## 2020-11-17 VITALS
WEIGHT: 191 LBS | BODY MASS INDEX: 32.61 KG/M2 | HEIGHT: 64 IN | OXYGEN SATURATION: 96 % | DIASTOLIC BLOOD PRESSURE: 70 MMHG | SYSTOLIC BLOOD PRESSURE: 104 MMHG | HEART RATE: 82 BPM

## 2020-11-17 DIAGNOSIS — J30.89 ALLERGIC RHINITIS DUE TO DUST MITE: ICD-10-CM

## 2020-11-17 DIAGNOSIS — T45.0X5A ANAPHYLAXIS AFTER ALLERGEN IMMUNOTHERAPY: ICD-10-CM

## 2020-11-17 DIAGNOSIS — J30.1 SEASONAL ALLERGIC RHINITIS DUE TO POLLEN: Primary | ICD-10-CM

## 2020-11-17 DIAGNOSIS — T88.6XXA ANAPHYLAXIS AFTER ALLERGEN IMMUNOTHERAPY: ICD-10-CM

## 2020-11-17 PROCEDURE — 99215 OFFICE O/P EST HI 40 MIN: CPT | Mod: 25 | Performed by: ALLERGY & IMMUNOLOGY

## 2020-11-17 PROCEDURE — 95180 RAPID DESENSITIZATION: CPT | Performed by: ALLERGY & IMMUNOLOGY

## 2020-11-17 PROCEDURE — 96372 THER/PROPH/DIAG INJ SC/IM: CPT | Mod: 59 | Performed by: ALLERGY & IMMUNOLOGY

## 2020-11-17 RX ORDER — EPINEPHRINE 0.3 MG/.3ML
0.3 INJECTION SUBCUTANEOUS ONCE
Status: COMPLETED | OUTPATIENT
Start: 2020-11-17 | End: 2020-11-17

## 2020-11-17 RX ORDER — CETIRIZINE HYDROCHLORIDE 10 MG/1
20 TABLET ORAL ONCE
Status: COMPLETED | OUTPATIENT
Start: 2020-11-17 | End: 2020-11-17

## 2020-11-17 RX ORDER — CETIRIZINE HYDROCHLORIDE 10 MG/1
10 TABLET ORAL DAILY
COMMUNITY

## 2020-11-17 RX ADMIN — CETIRIZINE HYDROCHLORIDE 20 MG: 10 TABLET ORAL at 09:02

## 2020-11-17 RX ADMIN — EPINEPHRINE 0.3 MG: 0.3 INJECTION SUBCUTANEOUS at 09:02

## 2020-11-17 ASSESSMENT — MIFFLIN-ST. JEOR: SCORE: 1491.37

## 2020-11-17 NOTE — PATIENT INSTRUCTIONS
Allergy Staff Appt Hours Shot Hours Locations    Physician     Ariel Espinoza DO       Support Staff     NARGIS Michaels CMA  Tuesday:        [place] 7-5 Wednesday:        [place]: 7-5 Thursday:                    [place] 7-6     Friday:  [place]  7-2   [place]        Thursday: 8-5:20        Friday: 7-12     [place]        Tuesday: 7- 3:20 Wednesday: 7-4:20     [place] Monday: 7-2:20 Tuesday: 9-5:20         [facility]  [address] [place] [address]  Appt Line: [phone]  Allergy RN:  [phone]    [facility]  [address] [place] [address]  Appt Line: [phone]  Allergy RN:  [phone]       Important Scheduling Information  Aspirin Desensitization: Appt will last 2 clinic days. Please call the Allergy RN line for your clinic to schedule. Discontinue antihistamines 7 days prior to the appointment.     Food Challenges: Appt will last 3-4 hours. Please call the Allergy RN line for your clinic to schedule. Discontinue antihistamines 7 days prior to the appointment.     Penicillin Testing: Appt will last 2-3 hours. Please call the Allergy RN line for your clinic to schedule. Discontinue antihistamines 7 days prior to the appointment.     Skin Testing: Appt will about 40 minutes. Call the appointment line for your clinic to schedule. Discontinue antihistamines 7 days prior to the appointment.     Venom Testing: Appt will last 2-3 hours. Please call the Allergy RN line for your clinic to schedule. Discontinue antihistamines 7 days prior to the appointment.     Thank you for trusting us with your Allergy, Asthma, and Immunology care. Please feel free to contact us with any questions or concerns you may have.

## 2020-11-17 NOTE — ASSESSMENT & PLAN NOTE
Nasal and ocular symptoms in the spring and fall. Symptoms when travels to [place] regardless of time of the year.     Allergy testing positive for dust mites and grass. Equivocal for ragweed. Positive for ragweed on blood testing.      Cluster immunotherapy   The patient received blue 0.2, blue 0.4, and yellow 0.05. Each dose was  by 30 minutes. Full report will be scanned into electronic medical record.    After last injection she developed felt flushed and hot on chest and neck. Erythematous rash was noted. Extending to bilateral arms. She had increased nasal congestion, post nasal drip and a dry cough. Examination normal otherwise. Vital signs stable. Treated with EpiPen 0.3mg in lateral thigh. Additionally treated with Zyrtec 20mg once. Observed until 1130am and discharged to home in stable condition. Discussed with patient risk of biphasic reaction and to use epipen and go to ER if experiences biphasic reaction.     She wishes to continue with cluster. Will not give more than 2 injections at a future visit. Next visit I will have her receive blue 0.2ml and blue 0.3ml.        - Allergen avoidance measures discussed and literature provided.   - Flonase 2 sprays/nostril daily.   - Zyrtec as needed.   - Continue allergy shots.

## 2020-11-17 NOTE — LETTER
11/17/2020         RE: Sun Andrade  [address]  [facility] [address]        Dear Colleague,    Thank you for referring your patient, Sun Andrade, to the [facility]. Please see a copy of my visit note below.    Cluster Allergen Immunotherapy:    After explaining risks and benefits, and obtaining verbal and written consent, we proceeded with cluster immunotherapy.     VISIT  VIAL COLOR/STRENGTH  DOSES TO BE GIVEN    1  GREEN (1:1000), BLUE (1:100)  GREEN 0.1, GREEN 0.4, BLUE 0.1    2  BLUE (1:100), YELLOW (1:10)  BLUE 0.2, BLUE 0.4, YELLOW 0.05    3  YELLOW (1:10)  YELLOW 0.1, YELLOW 0.15, YELLOW 0.25    4  YELLOW (1:10)  YELLOW 0.35, YELLOW 0.5    5  RED (1:1)  RED 0.05, RED 0.1    6  RED (1:1)  RED 0.15, RED 0.2    7  RED (1:1)  RED 0.3, RED 0.4    8  RED (1:1)  RED 0.5        VISIT 2    After explaining risks and benefits, and obtaining verbal and written consent, we proceeded with cluster immunotherapy.    Time Injection Given: 724  Blue 1:100   Grass, Dust Mite, Weeds  0.2 mL      Time Injection Given: 759  Blue 1:100   Grass, Dust Mite, Weeds  0.4 mL      Time Injection Given: 834  Yellow 1:10   Grass, Dust Mite, Weeds  0.05 mL        Start Time: 724  End Time: 1130      VITALS   Time BP Pulse pOx Reaction Treatment   755 104/68 71 98 - -   831 106/64 79 98 Congestion, flushing, cough EpiPen @902  Cetirizine @902   903 144/88 78 99  -   908 121/78 85 98  -   915 126/85 75 99  -   923 130/79 70 100  -   928 122/80 70 98  -   945 122/76 76 100     1001 105/67 79 100     1024 115/74 75 100     1059 123/70 71 100       SYSTEMIC REACTION/ ANAPHYLAXIS TREATMENT RECORD    Prior systemic reaction: No    History of asthma: No    Provider responding to medical emergency:  Olga      RECORD OF CURRENT REACTION. DID YOU:             Assess airway and breathing? Yes            Administer IM epinephrine? Yes            Activate EMS (call 911 or local rescue team)? No            Review  Protocol Yes      SYMPTOMS             SKIN: Flushing             RESPIRATORY: Cough            EYE/ NASAL: Congestion      MEDICATIONS ADMINISTERED        Time Medication Dose Route   902 EpiPen 0.3mg IM   902 cetirizine 20mg PO     Per provider verbal order, the following medication was given:     MEDICATION:Epinephrine   ROUTE: IM  SITE: Thigh - Right  DOSE: 0.3mg  LOT #: [id number]  :  Mylan  EXPIRATION DATE:  2/28/21  NDC#: 14954-477-12     MEDICATION:Cetirizine  ROUTE: PO  SITE: Medication was given orally   DOSE: 20mg   LOT #: [id number]  :  Major  EXPIRATION DATE:  4/30/21  NDC#: 0423-5850-58       Time of discharge:    11:30am    Condition upon release:    stable    Patient instructions:     Will back up dose for cluster.  Follow anaphylaxis action plan if needed.      Sun Andrade is a 45 year old White female with previous medical history significant for allergic rhinitis who returns for a follow up visit.    Patient presents today for cluster immunotherapy. The patient is currently in a good state of health. No recent fevers, chills, cough, wheezing, shortness of breath, skin rash, angioedema, nausea, vomiting or diarrhea. The risks and benefits were discussed and the patient/patient's family wishes to proceed. The consent was signed.    Past Medical History:   Diagnosis Date     Anxiety 11/18/2008     Attention deficit disorder of adult 11/18/2008     Depression 11/18/2008     Enlargement - tonsil/adenoid 11/18/2008     Itching 11/18/2008    in response to multiple medications-- see allergy list     Sleep apnea     mild; felt related to adenoids; no treatment     Family History   Problem Relation Age of Onset     Depression Mother      Breast Cancer Maternal Grandmother      Depression Maternal Grandmother      Diabetes Maternal Grandfather      C.A.D. Paternal Grandfather         CHF     Diabetes Paternal Grandmother      Asthma Daughter      Past Surgical History:   Procedure  Laterality Date     C  DELIVERY ONLY       LAPAROSCOPY,TUBAL CAUTERY       SURGICAL HISTORY OF -   09    discectomy L5-S1 for herniated disc       REVIEW OF SYSTEMS:  General: negative for weight gain. negative for weight loss. negative for changes in sleep.   Ears: negative for fullness. negative for hearing loss. negative for dizziness.   Nose: negative for snoring.negative for changes in smell. negative for drainage.   Eyes: negative for eye watering. negative for eye itching. negative for vision changes. negative for eye redness.  Throat: negative for hoarseness. negative for sore throat. negative for trouble swallowing.   Lungs: negative for shortness of breath.negative for wheezing. negative for sputum production.   Cardiovascular: negative for chest pain. negative for swelling of ankles. negative for fast or irregular heartbeat.   Gastrointestinal: negative for nausea. negative for heartburn. negative for acid reflux.   Musculoskeletal: negative for joint pain. negative for joint stiffness. negative for joint swelling.   Neurologic: negative for seizures. negative for fainting. negative for weakness.   Psychiatric: negative for changes in mood. negative for anxiety.   Endocrine: negative for cold intolerance. negative for heat intolerance. negative for tremors.   Lymphatic: negative for lower extremity swelling. negative for lymph node swelling.   Hematologic: negative for easy bruising. negative for easy bleeding.  Integumentary: negative for rash. negative for scaling. negative for nail changes.       Current Outpatient Medications:      Ascorbic Acid (VITAMIN C) 100 MG CHEW, , Disp: , Rfl:      buPROPion (WELLBUTRIN SR) 150 MG 12 hr tablet, Take 150 mg by mouth, Disp: , Rfl:      calcium-vitamin D (OSCAL) 250-125 MG-UNIT TABS per tablet, Take 1 tablet by mouth 2 times daily, Disp: , Rfl:      cetirizine (ZYRTEC) 10 MG tablet, Take 10 mg by mouth daily, Disp: , Rfl:      EPINEPHrine  (AUVI-Q) 0.3 MG/0.3ML injection 2-pack, Inject 0.3 mLs (0.3 mg) into the muscle as needed for anaphylaxis, Disp: 2 each, Rfl: 1     Ferrous Sulfate (IRON) 325 (65 Fe) MG tablet, Take 325 mg by mouth, Disp: , Rfl:      fish oil-omega-3 fatty acids 1000 MG capsule, Take 1,000 mg by mouth, Disp: , Rfl:      montelukast (SINGULAIR) 10 MG tablet, 10mg daily beginning two days prior to rush immunotherapy and take the morning of rush immunotherapy., Disp: 3 tablet, Rfl: 0     Multiple Vitamin (MULTI-VITAMIN DAILY PO), Take 1 tablet by mouth, Disp: , Rfl:      naproxen sodium (ANAPROX) 220 MG tablet, Take 220 mg by mouth 2 times daily (with meals), Disp: , Rfl:      ORDER FOR ALLERGEN IMMUNOTHERAPY, Dust Mites DF. 10,000 AU/mL, HS  0.5 ml Dust Mites DP. 10,000 AU/mL, HS  0.5 ml  Syed Grass (Std) 100,000 BAU/mL, HS 0.4 ml Ragweed, Mix (Giant, Short) 1:20 w/v, HS 0.4 ml Diluent: HSA qs to 5ml, Disp: 5 mL, Rfl: prn     predniSONE (DELTASONE) 10 MG tablet, Prednisone 20mg PO bid starting 2 days before rush and 40mg PO qam the morning prior to rush., Disp: 12 tablet, Rfl: 0     trimethoprim-polymyxin b (POLYTRIM) ophthalmic solution, Place 1 drop into both eyes 4 times daily., Disp: 1 Bottle, Rfl: 0  No current facility-administered medications for this visit.   Immunization History   Administered Date(s) Administered     HepB 05/31/2011     MMR 05/31/2011     Mantoux Tuberculin Skin Test 05/31/2011     TDAP Vaccine (Adacel) 01/19/2011     Allergies   Allergen Reactions     Ativan Itching     Cephalosporins      itching     Dilaudid [Hydromorphone Hcl]      Itching     Hyoscyamine      itching     Oxycodone      Itching     Penicillins      itching     Sulfa Drugs      Rash and itching     Ultram [Tramadol]      itching     Vicodin [Acetaminophen]      itching         EXAM:   Constitutional:  Appears well-developed and well-nourished. No distress.   HEENT:   Head: Normocephalic.   Neuro: Oriented to person, place, and  time.  Skin: Skin is warm and dry. No rash noted.   Psychiatric: Normal mood and affect.     Nursing note and vitals reviewed.    ASSESSMENT/PLAN:  Problem List Items Addressed This Visit        Respiratory    Allergic rhinitis due to dust mite     Nasal and ocular symptoms in the spring and fall. Symptoms when [place] to [place] regardless of time of the year.     Allergy testing positive for dust mites and grass. Equivocal for ragweed. Positive for ragweed on blood testing.      Cluster immunotherapy   The patient received blue 0.2, blue 0.4, and yellow 0.05. Each dose was  by 30 minutes. Full report will be scanned into electronic medical record.    After last injection she developed felt flushed and hot on chest and neck. Erythematous rash was noted. Extending to bilateral arms. She had increased nasal congestion, post nasal drip and a dry cough. Examination normal otherwise. Vital signs stable. Treated with EpiPen 0.3mg in lateral thigh. Additionally treated with Zyrtec 20mg once. Observed until 1130am and discharged to home in stable condition. Discussed with patient risk of biphasic reaction and to use epipen and go to ER if experiences biphasic reaction.     She wishes to continue with cluster. Will not give more than 2 injections at a future visit. Next visit I will have her receive blue 0.2ml and blue 0.3ml.        - Allergen avoidance measures discussed and literature provided.   - Flonase 2 sprays/nostril daily.   - Zyrtec as needed.   - Continue allergy shots.          Relevant Medications    cetirizine (ZYRTEC) 10 MG tablet    cetirizine (zyrTEC) tablet 20 mg (Completed)    Other Relevant Orders    RAPID DESENSITIZATION (Completed)    Seasonal allergic rhinitis due to pollen - Primary    Relevant Medications    cetirizine (ZYRTEC) 10 MG tablet    EPINEPHrine (ANY BX GENERIC EQUIV) injection 0.3 mg (Completed)    cetirizine (zyrTEC) tablet 20 mg (Completed)    Other Relevant Orders     RAPID DESENSITIZATION (Completed)      Other Visit Diagnoses     Anaphylaxis after allergen immunotherapy        Relevant Medications    cetirizine (ZYRTEC) 10 MG tablet    cetirizine (zyrTEC) tablet 20 mg (Completed)          Chart documentation with Dragon Voice recognition Software. Although reviewed after completion, some words and grammatical errors may remain.    Ariel Espinoza DO FAAAAI  Medical Director for Allergy/Immunology at [place], [place]        Again, thank you for allowing me to participate in the care of your patient.        Sincerely,        Ariel Espinoza DO

## 2020-11-17 NOTE — PROGRESS NOTES
Sun Andrade is a 45 year old White female with previous medical history significant for allergic rhinitis who returns for a follow up visit.    Patient presents today for cluster immunotherapy. The patient is currently in a good state of health. No recent fevers, chills, cough, wheezing, shortness of breath, skin rash, angioedema, nausea, vomiting or diarrhea. The risks and benefits were discussed and the patient/patient's family wishes to proceed. The consent was signed.    Past Medical History:   Diagnosis Date     Anxiety 2008     Attention deficit disorder of adult 2008     Depression 2008     Enlargement - tonsil/adenoid 2008     Itching 2008    in response to multiple medications-- see allergy list     Sleep apnea     mild; felt related to adenoids; no treatment     Family History   Problem Relation Age of Onset     Depression Mother      Breast Cancer Maternal Grandmother      Depression Maternal Grandmother      Diabetes Maternal Grandfather      C.A.D. Paternal Grandfather         CHF     Diabetes Paternal Grandmother      Asthma Daughter      Past Surgical History:   Procedure Laterality Date     C  DELIVERY ONLY       LAPAROSCOPY,TUBAL CAUTERY       SURGICAL HISTORY OF -   09    discectomy L5-S1 for herniated disc       REVIEW OF SYSTEMS:  General: negative for weight gain. negative for weight loss. negative for changes in sleep.   Ears: negative for fullness. negative for hearing loss. negative for dizziness.   Nose: negative for snoring.negative for changes in smell. negative for drainage.   Eyes: negative for eye watering. negative for eye itching. negative for vision changes. negative for eye redness.  Throat: negative for hoarseness. negative for sore throat. negative for trouble swallowing.   Lungs: negative for shortness of breath.negative for wheezing. negative for sputum production.   Cardiovascular: negative for chest pain. negative for  swelling of ankles. negative for fast or irregular heartbeat.   Gastrointestinal: negative for nausea. negative for heartburn. negative for acid reflux.   Musculoskeletal: negative for joint pain. negative for joint stiffness. negative for joint swelling.   Neurologic: negative for seizures. negative for fainting. negative for weakness.   Psychiatric: negative for changes in mood. negative for anxiety.   Endocrine: negative for cold intolerance. negative for heat intolerance. negative for tremors.   Lymphatic: negative for lower extremity swelling. negative for lymph node swelling.   Hematologic: negative for easy bruising. negative for easy bleeding.  Integumentary: negative for rash. negative for scaling. negative for nail changes.       Current Outpatient Medications:      Ascorbic Acid (VITAMIN C) 100 MG CHEW, , Disp: , Rfl:      buPROPion (WELLBUTRIN SR) 150 MG 12 hr tablet, Take 150 mg by mouth, Disp: , Rfl:      calcium-vitamin D (OSCAL) 250-125 MG-UNIT TABS per tablet, Take 1 tablet by mouth 2 times daily, Disp: , Rfl:      cetirizine (ZYRTEC) 10 MG tablet, Take 10 mg by mouth daily, Disp: , Rfl:      EPINEPHrine (AUVI-Q) 0.3 MG/0.3ML injection 2-pack, Inject 0.3 mLs (0.3 mg) into the muscle as needed for anaphylaxis, Disp: 2 each, Rfl: 1     Ferrous Sulfate (IRON) 325 (65 Fe) MG tablet, Take 325 mg by mouth, Disp: , Rfl:      fish oil-omega-3 fatty acids 1000 MG capsule, Take 1,000 mg by mouth, Disp: , Rfl:      montelukast (SINGULAIR) 10 MG tablet, 10mg daily beginning two days prior to rush immunotherapy and take the morning of rush immunotherapy., Disp: 3 tablet, Rfl: 0     Multiple Vitamin (MULTI-VITAMIN DAILY PO), Take 1 tablet by mouth, Disp: , Rfl:      naproxen sodium (ANAPROX) 220 MG tablet, Take 220 mg by mouth 2 times daily (with meals), Disp: , Rfl:      ORDER FOR ALLERGEN IMMUNOTHERAPY, Dust Mites DF. 10,000 AU/mL, HS  0.5 ml Dust Mites DP. 10,000 AU/mL, HS  0.5 ml  Syed Grass (Std) 100,000  BAU/mL, HS 0.4 ml Ragweed, Mix (Giant, Short) 1:20 w/v, HS 0.4 ml Diluent: HSA qs to 5ml, Disp: 5 mL, Rfl: prn     predniSONE (DELTASONE) 10 MG tablet, Prednisone 20mg PO bid starting 2 days before rush and 40mg PO qam the morning prior to rush., Disp: 12 tablet, Rfl: 0     trimethoprim-polymyxin b (POLYTRIM) ophthalmic solution, Place 1 drop into both eyes 4 times daily., Disp: 1 Bottle, Rfl: 0  No current facility-administered medications for this visit.   Immunization History   Administered Date(s) Administered     HepB 05/31/2011     MMR 05/31/2011     Mantoux Tuberculin Skin Test 05/31/2011     TDAP Vaccine (Adacel) 01/19/2011     Allergies   Allergen Reactions     Ativan Itching     Cephalosporins      itching     Dilaudid [Hydromorphone Hcl]      Itching     Hyoscyamine      itching     Oxycodone      Itching     Penicillins      itching     Sulfa Drugs      Rash and itching     Ultram [Tramadol]      itching     Vicodin [Acetaminophen]      itching         EXAM:   Constitutional:  Appears well-developed and well-nourished. No distress.   HEENT:   Head: Normocephalic.   Neuro: Oriented to person, place, and time.  Skin: Skin is warm and dry. No rash noted.   Psychiatric: Normal mood and affect.     Nursing note and vitals reviewed.    ASSESSMENT/PLAN:  Problem List Items Addressed This Visit        Respiratory    Allergic rhinitis due to dust mite     Nasal and ocular symptoms in the spring and fall. Symptoms when [place] regardless of time of the year.     Allergy testing positive for dust mites and grass. Equivocal for ragweed. Positive for ragweed on blood testing.      Cluster immunotherapy   The patient received blue 0.2, blue 0.4, and yellow 0.05. Each dose was  by 30 minutes. Full report will be scanned into electronic medical record.    After last injection she developed felt flushed and hot on chest and neck. Erythematous rash was noted. Extending to bilateral arms. She  had increased nasal congestion, post nasal drip and a dry cough. Examination normal otherwise. Vital signs stable. Treated with EpiPen 0.3mg in lateral thigh. Additionally treated with Zyrtec 20mg once. Observed until 1130am and discharged to home in stable condition. Discussed with patient risk of biphasic reaction and to use epipen and go to ER if experiences biphasic reaction.     She wishes to continue with cluster. Will not give more than 2 injections at a future visit. Next visit I will have her receive blue 0.2ml and blue 0.3ml.        - Allergen avoidance measures discussed and literature provided.   - Flonase 2 sprays/nostril daily.   - Zyrtec as needed.   - Continue allergy shots.          Relevant Medications    cetirizine (ZYRTEC) 10 MG tablet    cetirizine (zyrTEC) tablet 20 mg (Completed)    Other Relevant Orders    RAPID DESENSITIZATION (Completed)    Seasonal allergic rhinitis due to pollen - Primary    Relevant Medications    cetirizine (ZYRTEC) 10 MG tablet    EPINEPHrine (ANY BX GENERIC EQUIV) injection 0.3 mg (Completed)    cetirizine (zyrTEC) tablet 20 mg (Completed)    Other Relevant Orders    RAPID DESENSITIZATION (Completed)      Other Visit Diagnoses     Anaphylaxis after allergen immunotherapy        Relevant Medications    cetirizine (ZYRTEC) 10 MG tablet    cetirizine (zyrTEC) tablet 20 mg (Completed)          Chart documentation with Dragon Voice recognition Software. Although reviewed after completion, some words and grammatical errors may remain.    Ariel Espinoza DO FAAAAI  Medical Director for Allergy/Immunology at [place], [place]

## 2020-11-23 ENCOUNTER — TELEPHONE (OUTPATIENT)
Dept: ALLERGY | Facility: OTHER | Age: 45
End: 2020-11-23

## 2020-11-23 NOTE — TELEPHONE ENCOUNTER
Reason for Call:  Other call back    Detailed comments: Patient calling to cancel allergy shot for 11/24. Patient states that she is having cold/COVID symptoms, or still having a reaction to her last allergy shot. Patient would like a call back to discuss if she should continue with her shots. Please advise.    Phone Number Patient can be reached at: Cell number on file:    Telephone Information:   Mobile [phone]       Best Time: Any    Can we leave a detailed message on this number? YES    Call taken on 11/23/2020 at 7:45 AM by Emily Oneill

## 2020-11-24 NOTE — TELEPHONE ENCOUNTER
Spoke with patient.  Since she received her allergy shots on 11/17/20 she has had congestion and cough.  States it never got better and actually got worse on Saturday and says she is ill with what feels like a cold.  She did go in for a COVID test, which is pending.  Benadryl sinus has helped.    Explained that it does not sound like a reaction as it would not last for days afterward.  She already cancelled her appointment for today and we also cancelled for next week. We did add on a few cluster appointments.    Jackie Louis RN

## 2020-12-08 ENCOUNTER — OFFICE VISIT (OUTPATIENT)
Dept: ALLERGY | Facility: OTHER | Age: 45
End: 2020-12-08
Payer: COMMERCIAL

## 2020-12-08 VITALS
OXYGEN SATURATION: 99 % | WEIGHT: 183 LBS | TEMPERATURE: 97.7 F | HEART RATE: 71 BPM | DIASTOLIC BLOOD PRESSURE: 76 MMHG | SYSTOLIC BLOOD PRESSURE: 112 MMHG | BODY MASS INDEX: 31.24 KG/M2 | HEIGHT: 64 IN

## 2020-12-08 DIAGNOSIS — J30.89 ALLERGIC RHINITIS DUE TO DUST MITE: Primary | ICD-10-CM

## 2020-12-08 DIAGNOSIS — J30.1 SEASONAL ALLERGIC RHINITIS DUE TO POLLEN: ICD-10-CM

## 2020-12-08 PROCEDURE — 99207 PR NO CHARGE LOS: CPT | Performed by: ALLERGY & IMMUNOLOGY

## 2020-12-08 PROCEDURE — 95180 RAPID DESENSITIZATION: CPT | Performed by: ALLERGY & IMMUNOLOGY

## 2020-12-08 ASSESSMENT — MIFFLIN-ST. JEOR: SCORE: 1455.08

## 2020-12-08 ASSESSMENT — PAIN SCALES - GENERAL: PAINLEVEL: NO PAIN (0)

## 2020-12-08 NOTE — ASSESSMENT & PLAN NOTE
Nasal and ocular symptoms in the spring and fall. Symptoms when [place] to [place] regardless of time of the year.     Allergy testing positive for dust mites and grass. Equivocal for ragweed. Positive for ragweed on blood testing.      Cluster immunotherapy   The patient received blue 0.1, blue 0.2. Each dose was  by 30 minutes. Full report will be scanned into electronic medical record. The patient was in office for over 1.0 hours.    Anaphylaxis with cluster at last visit and has been 21 days since last shot.     - Allergen avoidance measures discussed and literature provided.   - Flonase 2 sprays/nostril daily.   - Zyrtec as needed.   - Continue allergy shots.

## 2020-12-08 NOTE — LETTER
2020         RE: Sun Andrade  [address]  [facility] [address]        Dear Colleague,    Thank you for referring your patient, Sun Andrade, to the [facility]. Please see a copy of my visit note below.    Sun Andrade is a 45 year old White female with previous medical history significant for allergic rhinitis who returns for a follow up visit.     Patient presents today for cluster immunotherapy. The patient is currently in a good state of health. No recent fevers, chills, cough, wheezing, shortness of breath, skin rash, angioedema, nausea, vomiting or diarrhea. The risks and benefits were discussed and the patient/patient's family wishes to proceed. The consent was signed.    Past Medical History:   Diagnosis Date     Anxiety 2008     Attention deficit disorder of adult 2008     Depression 2008     Enlargement - tonsil/adenoid 2008     Itching 2008    in response to multiple medications-- see allergy list     Sleep apnea     mild; felt related to adenoids; no treatment     Family History   Problem Relation Age of Onset     Depression Mother      Breast Cancer Maternal Grandmother      Depression Maternal Grandmother      Diabetes Maternal Grandfather      C.A.D. Paternal Grandfather         CHF     Diabetes Paternal Grandmother      Asthma Daughter      Past Surgical History:   Procedure Laterality Date     C  DELIVERY ONLY       LAPAROSCOPY,TUBAL CAUTERY       SURGICAL HISTORY OF -   09    discectomy L5-S1 for herniated disc       REVIEW OF SYSTEMS:  General: negative for weight gain. negative for weight loss. negative for changes in sleep.   Ears: negative for fullness. negative for hearing loss. negative for dizziness.   Nose: negative for snoring.negative for changes in smell. negative for drainage.   Eyes: negative for eye watering. negative for eye itching. negative for vision changes. negative for eye  redness.  Throat: negative for hoarseness. negative for sore throat. negative for trouble swallowing.   Lungs: negative for shortness of breath.negative for wheezing. negative for sputum production.   Cardiovascular: negative for chest pain. negative for swelling of ankles. negative for fast or irregular heartbeat.   Gastrointestinal: negative for nausea. negative for heartburn. negative for acid reflux.   Musculoskeletal: negative for joint pain. negative for joint stiffness. negative for joint swelling.   Neurologic: negative for seizures. negative for fainting. negative for weakness.   Psychiatric: negative for changes in mood. negative for anxiety.   Endocrine: negative for cold intolerance. negative for heat intolerance. negative for tremors.   Lymphatic: negative for lower extremity swelling. negative for lymph node swelling.   Hematologic: negative for easy bruising. negative for easy bleeding.  Integumentary: negative for rash. negative for scaling. negative for nail changes.       Current Outpatient Medications:      Ascorbic Acid (VITAMIN C) 100 MG CHEW, , Disp: , Rfl:      buPROPion (WELLBUTRIN SR) 150 MG 12 hr tablet, Take 150 mg by mouth, Disp: , Rfl:      calcium-vitamin D (OSCAL) 250-125 MG-UNIT TABS per tablet, Take 1 tablet by mouth 2 times daily, Disp: , Rfl:      cetirizine (ZYRTEC) 10 MG tablet, Take 10 mg by mouth daily, Disp: , Rfl:      EPINEPHrine (AUVI-Q) 0.3 MG/0.3ML injection 2-pack, Inject 0.3 mLs (0.3 mg) into the muscle as needed for anaphylaxis, Disp: 2 each, Rfl: 1     Ferrous Sulfate (IRON) 325 (65 Fe) MG tablet, Take 325 mg by mouth, Disp: , Rfl:      fish oil-omega-3 fatty acids 1000 MG capsule, Take 1,000 mg by mouth, Disp: , Rfl:      montelukast (SINGULAIR) 10 MG tablet, 10mg daily beginning two days prior to rush immunotherapy and take the morning of rush immunotherapy., Disp: 3 tablet, Rfl: 0     Multiple Vitamin (MULTI-VITAMIN DAILY PO), Take 1 tablet by mouth, Disp: , Rfl:       naproxen sodium (ANAPROX) 220 MG tablet, Take 220 mg by mouth 2 times daily (with meals), Disp: , Rfl:      ORDER FOR ALLERGEN IMMUNOTHERAPY, Dust Mites DF. 10,000 AU/mL, HS  0.5 ml Dust Mites DP. 10,000 AU/mL, HS  0.5 ml  Syed Grass (Std) 100,000 BAU/mL, HS 0.4 ml Ragweed, Mix (Giant, Short) 1:20 w/v, HS 0.4 ml Diluent: HSA qs to 5ml, Disp: 5 mL, Rfl: prn     trimethoprim-polymyxin b (POLYTRIM) ophthalmic solution, Place 1 drop into both eyes 4 times daily., Disp: 1 Bottle, Rfl: 0  Immunization History   Administered Date(s) Administered     HepB 05/31/2011     MMR 05/31/2011     Mantoux Tuberculin Skin Test 05/31/2011     TDAP Vaccine (Adacel) 01/19/2011     Allergies   Allergen Reactions     Ativan Itching     Cephalosporins      itching     Dilaudid [Hydromorphone Hcl]      Itching     Hyoscyamine      itching     Oxycodone      Itching     Penicillins      itching     Sulfa Drugs      Rash and itching     Ultram [Tramadol]      itching     Vicodin [Acetaminophen]      itching         EXAM:   Constitutional:  Appears well-developed and well-nourished. No distress.   HEENT:   Head: Normocephalic.   Cardiovascular: Normal rate, regular rhythm and normal heart sounds. Exam reveals no gallop and no friction rub.   No murmur heard.  Respiratory: Effort normal and breath sounds normal. No respiratory distress. No wheezes. No rales.   Musculoskeletal: Normal range of motion.   Neuro: Oriented to person, place, and time.  Skin: Skin is warm and dry. No rash noted.   Psychiatric: Normal mood and affect.     Nursing note and vitals reviewed.    ENVIRONMENTAL PERCUTANEOUS SKIN TESTING: ADULT  [place] [facility] 9/9/2020   Consent Y   Ordering Physician Olga   Interpreting Physician Olga   Testing Technician Jackie DAVILA   Location Back   Time start:  7:59 AM   Time End:  8:14 AM   Positive Control: Histatrol*ALK 1 mg/ml 4/38   Negative Control: 50% Glycerin 0   Cat Hair*ALK (10,000 BAU/ml) 0   AP Dog Hair/Dander  (1:100 w/v) 0   Dust Mite p. 30,000 AU/ml 10/50   Dust Mite f. (30,000 AU/ml) 5/40   Joe (W/F in millimeters) 0   Syed Grass (100,000 BAU/mL) 10/60   Red [place] (W/F in millimeters) 0   Maple/[place] (W/F in millimeters) 0   Hackberry (W/F in millimeters) 0   [place] (W/F in millimeters) 0   [place] *ALK (W/F in millimeters) 0   American Elm (W/F in millimeters) 0   [place] (W/F in millimeters) 0   Black [place] (W/F in millimeters) 0   Birch Mix (W/F in millimeters) 0   [place] (W/F in millimeters) 0   Oak (W/F in millimeters) 0   Cocklebur (W/F in millimeters) 0   [place] (W/F in millimeters) 0   White [place] (W/F in millimeters) 0   Careless (W/F in millimeters) 0   Nettle (W/F in millimeters) 0   English Plantain (W/F in millimeters) 0   Kochia (W/F in millimeters) 0   Lamb's Quarter (W/F in millimeters) 0   Marshelder (W/F in millimeters) 0   Ragweed Mix* ALK (W/F in millimeters) 0/20   Russian Thistle (W/F in millimeters) 0   Sagebrush/Mugwort (W/F in millimeters) 0   Sheep Sorrel (W/F in millimeters) 0   Feather Mix* ALK (W/F in millimeters) 0   Penicillium Mix (1:10 w/v) 0   Curvularia spicifera (1:10 w/v) 0   Epicoccum (1:10 w/v) 0   Aspergillus fumigatus (1:10 w/v): 0   Alternaria tenius (1:10 w/v) 0   H. Cladosporium (1:10 w/v) 0   Phoma herbarum (1:10 w/v) 0          ASSESSMENT/PLAN:  Problem List Items Addressed This Visit        Respiratory    Allergic rhinitis due to dust mite - Primary     Nasal and ocular symptoms in the spring and fall. Symptoms when travels to [place] regardless of time of the year.     Allergy testing positive for dust mites and grass. Equivocal for ragweed. Positive for ragweed on blood testing.      Cluster immunotherapy   The patient received blue 0.1, blue 0.2. Each dose was  by 30 minutes. Full report will be scanned into electronic medical record. The patient was in office for over 1.0 hours.    Anaphylaxis with cluster at last visit and has  been 21 days since last shot.     - Allergen avoidance measures discussed and literature provided.   - Flonase 2 sprays/nostril daily.   - Zyrtec as needed.   - Continue allergy shots.          Relevant Orders    RAPID DESENSITIZATION (Completed)    Seasonal allergic rhinitis due to pollen    Relevant Orders    RAPID DESENSITIZATION (Completed)          Chart documentation with Dragon Voice recognition Software. Although reviewed after completion, some words and grammatical errors may remain.    Ariel Espinoza DO FAAAAI  Medical Director for Allergy/Immunology at [place], [place]      Prior to initiation of cluster immunotherapy RN ensured that patient was feeling healthy, has premedicated with Zyrtec or Allegra yesterday twice daily and this morning. RN also ensured that patient has unexpired Epi-Pen, had no new medication changes, and did not have a reaction after the last allergy shots were given. If the patient has asthma it is well-controlled. The patient has not been ill in the past 7 days. Patient was given allergy injections per cluster immunotherapy protocol 30 minutes apart and vital signs were monitored. Patient was monitored in clinic for 30 minutes after last injection was given and assessed by provider before discharging.     Jackie Louis RN      Cluster Allergen Immunotherapy:    After explaining risks and benefits, and obtaining verbal and written consent, we proceeded with cluster immunotherapy.     VISIT  VIAL COLOR/STRENGTH  DOSES TO BE GIVEN    1  GREEN (1:1000), BLUE (1:100)  GREEN 0.1, GREEN 0.4, BLUE 0.1    2  BLUE (1:100), YELLOW (1:10)  BLUE 0.2, BLUE 0.4, YELLOW 0.05    3  YELLOW (1:10)  YELLOW 0.1, YELLOW 0.15, YELLOW 0.25    4  YELLOW (1:10)  YELLOW 0.35, YELLOW 0.5    5  RED (1:1)  RED 0.05, RED 0.1    6  RED (1:1)  RED 0.15, RED 0.2    7  RED (1:1)  RED 0.3, RED 0.4    8  RED (1:1)  RED 0.5        VISIT 1    Time Injection Given:  726  Blue 1:100   Grass, Dust Mite, Weeds    0.1 mL      Time Injection Given: 801  Blue 1:100   Grass, Dust Mite, Weeds    0.2 mL        Start Time: 726  End Time: 837        VITALS   Time BP Pulse pOx Reaction Treatment   800 110/66 78 99 - -   837 100/62 76 99 - -       Again, thank you for allowing me to participate in the care of your patient.        Sincerely,        Ariel Espinoza, DO

## 2020-12-08 NOTE — PROGRESS NOTES
Sun Andrade is a 45 year old White female with previous medical history significant for allergic rhinitis who returns for a follow up visit.     Patient presents today for cluster immunotherapy. The patient is currently in a good state of health. No recent fevers, chills, cough, wheezing, shortness of breath, skin rash, angioedema, nausea, vomiting or diarrhea. The risks and benefits were discussed and the patient/patient's family wishes to proceed. The consent was signed.    Past Medical History:   Diagnosis Date     Anxiety 2008     Attention deficit disorder of adult 2008     Depression 2008     Enlargement - tonsil/adenoid 2008     Itching 2008    in response to multiple medications-- see allergy list     Sleep apnea     mild; felt related to adenoids; no treatment     Family History   Problem Relation Age of Onset     Depression Mother      Breast Cancer Maternal Grandmother      Depression Maternal Grandmother      Diabetes Maternal Grandfather      C.A.D. Paternal Grandfather         CHF     Diabetes Paternal Grandmother      Asthma Daughter      Past Surgical History:   Procedure Laterality Date     C  DELIVERY ONLY       LAPAROSCOPY,TUBAL CAUTERY       SURGICAL HISTORY OF -   09    discectomy L5-S1 for herniated disc       REVIEW OF SYSTEMS:  General: negative for weight gain. negative for weight loss. negative for changes in sleep.   Ears: negative for fullness. negative for hearing loss. negative for dizziness.   Nose: negative for snoring.negative for changes in smell. negative for drainage.   Eyes: negative for eye watering. negative for eye itching. negative for vision changes. negative for eye redness.  Throat: negative for hoarseness. negative for sore throat. negative for trouble swallowing.   Lungs: negative for shortness of breath.negative for wheezing. negative for sputum production.   Cardiovascular: negative for chest pain. negative for  swelling of ankles. negative for fast or irregular heartbeat.   Gastrointestinal: negative for nausea. negative for heartburn. negative for acid reflux.   Musculoskeletal: negative for joint pain. negative for joint stiffness. negative for joint swelling.   Neurologic: negative for seizures. negative for fainting. negative for weakness.   Psychiatric: negative for changes in mood. negative for anxiety.   Endocrine: negative for cold intolerance. negative for heat intolerance. negative for tremors.   Lymphatic: negative for lower extremity swelling. negative for lymph node swelling.   Hematologic: negative for easy bruising. negative for easy bleeding.  Integumentary: negative for rash. negative for scaling. negative for nail changes.       Current Outpatient Medications:      Ascorbic Acid (VITAMIN C) 100 MG CHEW, , Disp: , Rfl:      buPROPion (WELLBUTRIN SR) 150 MG 12 hr tablet, Take 150 mg by mouth, Disp: , Rfl:      calcium-vitamin D (OSCAL) 250-125 MG-UNIT TABS per tablet, Take 1 tablet by mouth 2 times daily, Disp: , Rfl:      cetirizine (ZYRTEC) 10 MG tablet, Take 10 mg by mouth daily, Disp: , Rfl:      EPINEPHrine (AUVI-Q) 0.3 MG/0.3ML injection 2-pack, Inject 0.3 mLs (0.3 mg) into the muscle as needed for anaphylaxis, Disp: 2 each, Rfl: 1     Ferrous Sulfate (IRON) 325 (65 Fe) MG tablet, Take 325 mg by mouth, Disp: , Rfl:      fish oil-omega-3 fatty acids 1000 MG capsule, Take 1,000 mg by mouth, Disp: , Rfl:      montelukast (SINGULAIR) 10 MG tablet, 10mg daily beginning two days prior to rush immunotherapy and take the morning of rush immunotherapy., Disp: 3 tablet, Rfl: 0     Multiple Vitamin (MULTI-VITAMIN DAILY PO), Take 1 tablet by mouth, Disp: , Rfl:      naproxen sodium (ANAPROX) 220 MG tablet, Take 220 mg by mouth 2 times daily (with meals), Disp: , Rfl:      ORDER FOR ALLERGEN IMMUNOTHERAPY, Dust Mites DF. 10,000 AU/mL, HS  0.5 ml Dust Mites DP. 10,000 AU/mL, HS  0.5 ml  Syed Grass (Std) 100,000  BAU/mL, HS 0.4 ml Ragweed, Mix (Giant, Short) 1:20 w/v, HS 0.4 ml Diluent: HSA qs to 5ml, Disp: 5 mL, Rfl: prn     trimethoprim-polymyxin b (POLYTRIM) ophthalmic solution, Place 1 drop into both eyes 4 times daily., Disp: 1 Bottle, Rfl: 0  Immunization History   Administered Date(s) Administered     HepB 05/31/2011     MMR 05/31/2011     Mantoux Tuberculin Skin Test 05/31/2011     TDAP Vaccine (Adacel) 01/19/2011     Allergies   Allergen Reactions     Ativan Itching     Cephalosporins      itching     Dilaudid [Hydromorphone Hcl]      Itching     Hyoscyamine      itching     Oxycodone      Itching     Penicillins      itching     Sulfa Drugs      Rash and itching     Ultram [Tramadol]      itching     Vicodin [Acetaminophen]      itching         EXAM:   Constitutional:  Appears well-developed and well-nourished. No distress.   HEENT:   Head: Normocephalic.   Cardiovascular: Normal rate, regular rhythm and normal heart sounds. Exam reveals no gallop and no friction rub.   No murmur heard.  Respiratory: Effort normal and breath sounds normal. No respiratory distress. No wheezes. No rales.   Musculoskeletal: Normal range of motion.   Neuro: Oriented to person, place, and time.  Skin: Skin is warm and dry. No rash noted.   Psychiatric: Normal mood and affect.     Nursing note and vitals reviewed.    ENVIRONMENTAL PERCUTANEOUS SKIN TESTING: ADULT  [place] [facility] 9/9/2020   Consent Y   Ordering Physician Olga   Interpreting Physician Olga   Testing Technician Jackie DAVILA   Location Back   Time start:  7:59 AM   Time End:  8:14 AM   Positive Control: Histatrol*ALK 1 mg/ml 4/38   Negative Control: 50% Glycerin 0   Cat Hair*ALK (10,000 BAU/ml) 0   AP Dog Hair/Dander (1:100 w/v) 0   Dust Mite p. 30,000 AU/ml 10/50   Dust Mite f. (30,000 AU/ml) 5/40   Joe (W/F in millimeters) 0   Syed Grass (100,000 BAU/mL) 10/60   Red [place] (W/F in millimeters) 0   Maple/[place] (W/F in millimeters) 0   Hackberry (W/F in  millimeters) 0   [place] (W/F in millimeters) 0   [place] *ALK (W/F in millimeters) 0   American Elm (W/F in millimeters) 0   [place] (W/F in millimeters) 0   Black [place] (W/F in millimeters) 0   Birch Mix (W/F in millimeters) 0   [place] (W/F in millimeters) 0   Oak (W/F in millimeters) 0   [place]le[place] (W/F in millimeters) 0   [place] (W/F in millimeters) 0   White [place] (W/F in millimeters) 0   Careless (W/F in millimeters) 0   Nettle (W/F in millimeters) 0   English Plantain (W/F in millimeters) 0   Kochia (W/F in millimeters) 0   Lamb's Quarter (W/F in millimeters) 0   Marshelder (W/F in millimeters) 0   Ragweed Mix* ALK (W/F in millimeters) 0/20   Russian Thistle (W/F in millimeters) 0   Sagebrush/Mugwort (W/F in millimeters) 0   Sheep Sorrel (W/F in millimeters) 0   Feather Mix* ALK (W/F in millimeters) 0   Penicillium Mix (1:10 w/v) 0   Curvularia spicifera (1:10 w/v) 0   Epicoccum (1:10 w/v) 0   Aspergillus fumigatus (1:10 w/v): 0   Alternaria tenius (1:10 w/v) 0   H. Cladosporium (1:10 w/v) 0   Phoma herbarum (1:10 w/v) 0          ASSESSMENT/PLAN:  Problem List Items Addressed This Visit        Respiratory    Allergic rhinitis due to dust mite - Primary     Nasal and ocular symptoms in the spring and fall. Symptoms when travels to [place] regardless of time of the year.     Allergy testing positive for dust mites and grass. Equivocal for ragweed. Positive for ragweed on blood testing.      Cluster immunotherapy   The patient received blue 0.1, blue 0.2. Each dose was  by 30 minutes. Full report will be scanned into electronic medical record. The patient was in office for over 1.0 hours.    Anaphylaxis with cluster at last visit and has been 21 days since last shot.     - Allergen avoidance measures discussed and literature provided.   - Flonase 2 sprays/nostril daily.   - Zyrtec as needed.   - Continue allergy shots.          Relevant Orders    RAPID DESENSITIZATION (Completed)     Seasonal allergic rhinitis due to pollen    Relevant Orders    RAPID DESENSITIZATION (Completed)          Chart documentation with Dragon Voice recognition Software. Although reviewed after completion, some words and grammatical errors may remain.    DO SHANNON Lindsay  Medical Director for Allergy/Immunology at [place], [place]

## 2020-12-08 NOTE — PATIENT INSTRUCTIONS
Allergy Staff Appt Hours Shot Hours Locations    Physician     Ariel Espinoza DO       Support Staff     NARGIS Michaels CMA  Tuesday:        [place] 7-5 Wednesday:        [place]: 7-5 Thursday:                    [place] 7-6     Friday:  [place]  7-2   [place]        Thursday: 8-5:20        Friday: 7-12     [place]        Tuesday: 7- 3:20 Wednesday: 7-4:20     [place] Monday: 7-2:20 Tuesday: 9-5:20         [facility]  [address] [place] [address]  Appt Line: [phone]  Allergy RN:  [phone]    [facility]  [address] [place] [address]  Appt Line: [phone]  Allergy RN:  [phone]       Important Scheduling Information  Aspirin Desensitization: Appt will last 2 clinic days. Please call the Allergy RN line for your clinic to schedule. Discontinue antihistamines 7 days prior to the appointment.     Food Challenges: Appt will last 3-4 hours. Please call the Allergy RN line for your clinic to schedule. Discontinue antihistamines 7 days prior to the appointment.     Penicillin Testing: Appt will last 2-3 hours. Please call the Allergy RN line for your clinic to schedule. Discontinue antihistamines 7 days prior to the appointment.     Skin Testing: Appt will about 40 minutes. Call the appointment line for your clinic to schedule. Discontinue antihistamines 7 days prior to the appointment.     Venom Testing: Appt will last 2-3 hours. Please call the Allergy RN line for your clinic to schedule. Discontinue antihistamines 7 days prior to the appointment.     Thank you for trusting us with your Allergy, Asthma, and Immunology care. Please feel free to contact us with any questions or concerns you may have.

## 2020-12-08 NOTE — PROGRESS NOTES
Prior to initiation of cluster immunotherapy RN ensured that patient was feeling healthy, has premedicated with Zyrtec or Allegra yesterday twice daily and this morning. RN also ensured that patient has unexpired Epi-Pen, had no new medication changes, and did not have a reaction after the last allergy shots were given. If the patient has asthma it is well-controlled. The patient has not been ill in the past 7 days. Patient was given allergy injections per cluster immunotherapy protocol 30 minutes apart and vital signs were monitored. Patient was monitored in clinic for 30 minutes after last injection was given and assessed by provider before discharging.     Jackie Louis RN      Cluster Allergen Immunotherapy:    After explaining risks and benefits, and obtaining verbal and written consent, we proceeded with cluster immunotherapy.     VISIT  VIAL COLOR/STRENGTH  DOSES TO BE GIVEN    1  GREEN (1:1000), BLUE (1:100)  GREEN 0.1, GREEN 0.4, BLUE 0.1    2  BLUE (1:100), YELLOW (1:10)  BLUE 0.2, BLUE 0.4, YELLOW 0.05    3  YELLOW (1:10)  YELLOW 0.1, YELLOW 0.15, YELLOW 0.25    4  YELLOW (1:10)  YELLOW 0.35, YELLOW 0.5    5  RED (1:1)  RED 0.05, RED 0.1    6  RED (1:1)  RED 0.15, RED 0.2    7  RED (1:1)  RED 0.3, RED 0.4    8  RED (1:1)  RED 0.5        VISIT 1    Time Injection Given: 726  Blue 1:100   Grass, Dust Mite, Weeds    0.1 mL      Time Injection Given: 801  Blue 1:100   Grass, Dust Mite, Weeds    0.2 mL        Start Time: 726  End Time: 837        VITALS   Time BP Pulse pOx Reaction Treatment   800 110/66 78 99 - -   837 100/62 76 99 - -

## 2020-12-11 NOTE — PROGRESS NOTES
Cluster Allergen Immunotherapy:    After explaining risks and benefits, and obtaining verbal and written consent, we proceeded with cluster immunotherapy.     VISIT  VIAL COLOR/STRENGTH  DOSES TO BE GIVEN    1  GREEN (1:1000), BLUE (1:100)  GREEN 0.1, GREEN 0.4, BLUE 0.1    2  BLUE (1:100), YELLOW (1:10)  BLUE 0.2, BLUE 0.4, YELLOW 0.05    3  YELLOW (1:10)  YELLOW 0.1, YELLOW 0.15, YELLOW 0.25    4  YELLOW (1:10)  YELLOW 0.35, YELLOW 0.5    5  RED (1:1)  RED 0.05, RED 0.1    6  RED (1:1)  RED 0.15, RED 0.2    7  RED (1:1)  RED 0.3, RED 0.4    8  RED (1:1)  RED 0.5        VISIT 2    After explaining risks and benefits, and obtaining verbal and written consent, we proceeded with cluster immunotherapy.    Time Injection Given: 912  Blue 1:100   Grass, Dust Mite, Weeds  0.3 mL      Time Injection Given: 948  Blue 1:100   Grass, Dust Mite, Weeds  0.4 mL          Start Time: 912  End Time: 1020        VITALS   Time BP Pulse pOx Reaction Treatment   945 106/62 80 98 - -   1020 90/68 74 99 - -

## 2020-12-15 ENCOUNTER — OFFICE VISIT (OUTPATIENT)
Dept: ALLERGY | Facility: OTHER | Age: 45
End: 2020-12-15
Payer: COMMERCIAL

## 2020-12-15 VITALS
HEART RATE: 85 BPM | BODY MASS INDEX: 31.24 KG/M2 | DIASTOLIC BLOOD PRESSURE: 66 MMHG | WEIGHT: 183 LBS | HEIGHT: 64 IN | OXYGEN SATURATION: 98 % | SYSTOLIC BLOOD PRESSURE: 106 MMHG

## 2020-12-15 DIAGNOSIS — J30.89 ALLERGIC RHINITIS DUE TO DUST MITE: Primary | ICD-10-CM

## 2020-12-15 DIAGNOSIS — J30.1 SEASONAL ALLERGIC RHINITIS DUE TO POLLEN: ICD-10-CM

## 2020-12-15 PROCEDURE — 99207 PR NO CHARGE LOS: CPT | Performed by: ALLERGY & IMMUNOLOGY

## 2020-12-15 PROCEDURE — 95180 RAPID DESENSITIZATION: CPT | Performed by: ALLERGY & IMMUNOLOGY

## 2020-12-15 ASSESSMENT — MIFFLIN-ST. JEOR: SCORE: 1455.08

## 2020-12-15 ASSESSMENT — PAIN SCALES - GENERAL: PAINLEVEL: NO PAIN (0)

## 2020-12-15 NOTE — ASSESSMENT & PLAN NOTE
Nasal and ocular symptoms in the spring and fall. Symptoms when travels to [place] regardless of time of the year.     Allergy testing positive for dust mites and grass. Equivocal for ragweed. Positive for ragweed on blood testing.      Cluster immunotherapy   The patient received blue 0.3, blue 0.4. Each dose was  by 30 minutes. Full report will be scanned into electronic medical record. The patient was in office for over 1.0 hours.          - Flonase 2 sprays/nostril daily.   - Zyrtec as needed.   - Continue allergy shots.

## 2020-12-15 NOTE — PATIENT INSTRUCTIONS
Allergy Staff Appt Hours Shot Hours Locations    Physician     Ariel Espinoza DO       Support Staff     NARGIS Michaels CMA  Tuesday:        [place] 7-5 Wednesday:        [place]: 7-5 Thursday:                    [place] 7-6     Friday:  [place]  7-2   [place]        Thursday: 8-5:20        Friday: 7-12     [place]        Tuesday: 7- 3:20 Wednesday: 7-4:20     [place] Monday: 7-2:20 Tuesday: 9-5:20         [facility]  [address] [place] [address]  Appt Line: [phone]  Allergy RN:  [phone]    [facility]  [address] [place] [address]  Appt Line: [phone]  Allergy RN:  [phone]       Important Scheduling Information  Aspirin Desensitization: Appt will last 2 clinic days. Please call the Allergy RN line for your clinic to schedule. Discontinue antihistamines 7 days prior to the appointment.     Food Challenges: Appt will last 3-4 hours. Please call the Allergy RN line for your clinic to schedule. Discontinue antihistamines 7 days prior to the appointment.     Penicillin Testing: Appt will last 2-3 hours. Please call the Allergy RN line for your clinic to schedule. Discontinue antihistamines 7 days prior to the appointment.     Skin Testing: Appt will about 40 minutes. Call the appointment line for your clinic to schedule. Discontinue antihistamines 7 days prior to the appointment.     Venom Testing: Appt will last 2-3 hours. Please call the Allergy RN line for your clinic to schedule. Discontinue antihistamines 7 days prior to the appointment.     Thank you for trusting us with your Allergy, Asthma, and Immunology care. Please feel free to contact us with any questions or concerns you may have.

## 2020-12-15 NOTE — LETTER
12/15/2020         RE: Sun Andrade  [address]  [facility] [address]        Dear Colleague,    Thank you for referring your patient, Sun Andrade, to the [facility]. Please see a copy of my visit note below.    Cluster Allergen Immunotherapy:    After explaining risks and benefits, and obtaining verbal and written consent, we proceeded with cluster immunotherapy.     VISIT  VIAL COLOR/STRENGTH  DOSES TO BE GIVEN    1  GREEN (1:1000), BLUE (1:100)  GREEN 0.1, GREEN 0.4, BLUE 0.1    2  BLUE (1:100), YELLOW (1:10)  BLUE 0.2, BLUE 0.4, YELLOW 0.05    3  YELLOW (1:10)  YELLOW 0.1, YELLOW 0.15, YELLOW 0.25    4  YELLOW (1:10)  YELLOW 0.35, YELLOW 0.5    5  RED (1:1)  RED 0.05, RED 0.1    6  RED (1:1)  RED 0.15, RED 0.2    7  RED (1:1)  RED 0.3, RED 0.4    8  RED (1:1)  RED 0.5        VISIT 2    After explaining risks and benefits, and obtaining verbal and written consent, we proceeded with cluster immunotherapy.    Time Injection Given: 912  Blue 1:100   Grass, Dust Mite, Weeds  0.3 mL      Time Injection Given: 948  Blue 1:100   Grass, Dust Mite, Weeds  0.4 mL          Start Time: 912  End Time: 1020        VITALS   Time BP Pulse pOx Reaction Treatment   945 106/62 80 98 - -   1020 90/68 74 99 - -       Sun Andrade is a 45 year old White female with previous medical history significant for allergic rhinitis who returns for a follow up visit.     Patient presents today for cluster immunotherapy. The patient is currently in a good state of health. No recent fevers, chills, cough, wheezing, shortness of breath, skin rash, angioedema, nausea, vomiting or diarrhea. The risks and benefits were discussed and the patient/patient's family wishes to proceed. The consent was signed.    Past Medical History:   Diagnosis Date     Anxiety 11/18/2008     Attention deficit disorder of adult 11/18/2008     Depression 11/18/2008     Enlargement - tonsil/adenoid 11/18/2008     Itching 11/18/2008     in response to multiple medications-- see allergy list     Sleep apnea     mild; felt related to adenoids; no treatment     Family History   Problem Relation Age of Onset     Depression Mother      Breast Cancer Maternal Grandmother      Depression Maternal Grandmother      Diabetes Maternal Grandfather      C.A.D. Paternal Grandfather         CHF     Diabetes Paternal Grandmother      Asthma Daughter      Past Surgical History:   Procedure Laterality Date     C  DELIVERY ONLY       LAPAROSCOPY,TUBAL CAUTERY       SURGICAL HISTORY OF -   09    discectomy L5-S1 for herniated disc       REVIEW OF SYSTEMS:  General: negative for weight gain. negative for weight loss. negative for changes in sleep.   Ears: negative for fullness. negative for hearing loss. negative for dizziness.   Nose: negative for snoring.negative for changes in smell. negative for drainage.   Eyes: negative for eye watering. negative for eye itching. negative for vision changes. negative for eye redness.  Throat: negative for hoarseness. negative for sore throat. negative for trouble swallowing.   Lungs: negative for shortness of breath.negative for wheezing. negative for sputum production.   Cardiovascular: negative for chest pain. negative for swelling of ankles. negative for fast or irregular heartbeat.   Gastrointestinal: negative for nausea. negative for heartburn. negative for acid reflux.   Musculoskeletal: negative for joint pain. negative for joint stiffness. negative for joint swelling.   Neurologic: negative for seizures. negative for fainting. negative for weakness.   Psychiatric: negative for changes in mood. negative for anxiety.   Endocrine: negative for cold intolerance. negative for heat intolerance. negative for tremors.   Lymphatic: negative for lower extremity swelling. negative for lymph node swelling.   Hematologic: negative for easy bruising. negative for easy bleeding.  Integumentary: negative for rash.  negative for scaling. negative for nail changes.       Current Outpatient Medications:      Ascorbic Acid (VITAMIN C) 100 MG CHEW, , Disp: , Rfl:      buPROPion (WELLBUTRIN SR) 150 MG 12 hr tablet, Take 150 mg by mouth, Disp: , Rfl:      calcium-vitamin D (OSCAL) 250-125 MG-UNIT TABS per tablet, Take 1 tablet by mouth 2 times daily, Disp: , Rfl:      cetirizine (ZYRTEC) 10 MG tablet, Take 10 mg by mouth daily, Disp: , Rfl:      EPINEPHrine (AUVI-Q) 0.3 MG/0.3ML injection 2-pack, Inject 0.3 mLs (0.3 mg) into the muscle as needed for anaphylaxis, Disp: 2 each, Rfl: 1     Ferrous Sulfate (IRON) 325 (65 Fe) MG tablet, Take 325 mg by mouth, Disp: , Rfl:      fish oil-omega-3 fatty acids 1000 MG capsule, Take 1,000 mg by mouth, Disp: , Rfl:      montelukast (SINGULAIR) 10 MG tablet, 10mg daily beginning two days prior to rush immunotherapy and take the morning of rush immunotherapy., Disp: 3 tablet, Rfl: 0     Multiple Vitamin (MULTI-VITAMIN DAILY PO), Take 1 tablet by mouth, Disp: , Rfl:      naproxen sodium (ANAPROX) 220 MG tablet, Take 220 mg by mouth 2 times daily (with meals), Disp: , Rfl:      ORDER FOR ALLERGEN IMMUNOTHERAPY, Dust Mites DF. 10,000 AU/mL, HS  0.5 ml Dust Mites DP. 10,000 AU/mL, HS  0.5 ml  Syed Grass (Std) 100,000 BAU/mL, HS 0.4 ml Ragweed, Mix (Giant, Short) 1:20 w/v, HS 0.4 ml Diluent: HSA qs to 5ml, Disp: 5 mL, Rfl: prn     trimethoprim-polymyxin b (POLYTRIM) ophthalmic solution, Place 1 drop into both eyes 4 times daily., Disp: 1 Bottle, Rfl: 0  Immunization History   Administered Date(s) Administered     HepB 05/31/2011     MMR 05/31/2011     Mantoux Tuberculin Skin Test 05/31/2011     TDAP Vaccine (Adacel) 01/19/2011     Allergies   Allergen Reactions     Ativan Itching     Cephalosporins      itching     Dilaudid [Hydromorphone Hcl]      Itching     Hyoscyamine      itching     Oxycodone      Itching     Penicillins      itching     Sulfa Drugs      Rash and itching     Ultram [Tramadol]       itching     Vicodin [Acetaminophen]      itching         EXAM:   Constitutional:  Appears well-developed and well-nourished. No distress.   HEENT:   Head: Normocephalic.   Eyes: Conjunctivae are non-erythematous   Cardiovascular: Normal rate, regular rhythm and normal heart sounds. Exam reveals no gallop and no friction rub.   No murmur heard.  Respiratory: Effort normal and breath sounds normal. No respiratory distress. No wheezes. No rales.   Neuro: Oriented to person, place, and time.  Skin: Skin is warm and dry. No rash noted.   Psychiatric: Normal mood and affect.     Nursing note and vitals reviewed.    ASSESSMENT/PLAN:  Problem List Items Addressed This Visit        Respiratory    Allergic rhinitis due to dust mite - Primary     Nasal and ocular symptoms in the spring and fall. Symptoms when travels to [place] clim[place] regardless of time of the year.     Allergy testing positive for dust mites and grass. Equivocal for ragweed. Positive for ragweed on blood testing.      Cluster immunotherapy   The patient received blue 0.3, blue 0.4. Each dose was  by 30 minutes. Full report will be scanned into electronic medical record. The patient was in office for over 1.0 hours.          - Flonase 2 sprays/nostril daily.   - Zyrtec as needed.   - Continue allergy shots.          Relevant Orders    RAPID DESENSITIZATION (Completed)    Seasonal allergic rhinitis due to pollen    Relevant Orders    RAPID DESENSITIZATION (Completed)          Chart documentation with Dragon Voice recognition Software. Although reviewed after completion, some words and grammatical errors may remain.    Ariel Espinoza DO FAAAAI  Medical Director for Allergy/Immunology at [place], [place]        Again, thank you for allowing me to participate in the care of your patient.        Sincerely,        Ariel Espinoza DO

## 2020-12-15 NOTE — PROGRESS NOTES
Sun Andrade is a 45 year old White female with previous medical history significant for allergic rhinitis who returns for a follow up visit.     Patient presents today for cluster immunotherapy. The patient is currently in a good state of health. No recent fevers, chills, cough, wheezing, shortness of breath, skin rash, angioedema, nausea, vomiting or diarrhea. The risks and benefits were discussed and the patient/patient's family wishes to proceed. The consent was signed.    Past Medical History:   Diagnosis Date     Anxiety 2008     Attention deficit disorder of adult 2008     Depression 2008     Enlargement - tonsil/adenoid 2008     Itching 2008    in response to multiple medications-- see allergy list     Sleep apnea     mild; felt related to adenoids; no treatment     Family History   Problem Relation Age of Onset     Depression Mother      Breast Cancer Maternal Grandmother      Depression Maternal Grandmother      Diabetes Maternal Grandfather      C.A.D. Paternal Grandfather         CHF     Diabetes Paternal Grandmother      Asthma Daughter      Past Surgical History:   Procedure Laterality Date     C  DELIVERY ONLY       LAPAROSCOPY,TUBAL CAUTERY       SURGICAL HISTORY OF -   09    discectomy L5-S1 for herniated disc       REVIEW OF SYSTEMS:  General: negative for weight gain. negative for weight loss. negative for changes in sleep.   Ears: negative for fullness. negative for hearing loss. negative for dizziness.   Nose: negative for snoring.negative for changes in smell. negative for drainage.   Eyes: negative for eye watering. negative for eye itching. negative for vision changes. negative for eye redness.  Throat: negative for hoarseness. negative for sore throat. negative for trouble swallowing.   Lungs: negative for shortness of breath.negative for wheezing. negative for sputum production.   Cardiovascular: negative for chest pain. negative for  swelling of ankles. negative for fast or irregular heartbeat.   Gastrointestinal: negative for nausea. negative for heartburn. negative for acid reflux.   Musculoskeletal: negative for joint pain. negative for joint stiffness. negative for joint swelling.   Neurologic: negative for seizures. negative for fainting. negative for weakness.   Psychiatric: negative for changes in mood. negative for anxiety.   Endocrine: negative for cold intolerance. negative for heat intolerance. negative for tremors.   Lymphatic: negative for lower extremity swelling. negative for lymph node swelling.   Hematologic: negative for easy bruising. negative for easy bleeding.  Integumentary: negative for rash. negative for scaling. negative for nail changes.       Current Outpatient Medications:      Ascorbic Acid (VITAMIN C) 100 MG CHEW, , Disp: , Rfl:      buPROPion (WELLBUTRIN SR) 150 MG 12 hr tablet, Take 150 mg by mouth, Disp: , Rfl:      calcium-vitamin D (OSCAL) 250-125 MG-UNIT TABS per tablet, Take 1 tablet by mouth 2 times daily, Disp: , Rfl:      cetirizine (ZYRTEC) 10 MG tablet, Take 10 mg by mouth daily, Disp: , Rfl:      EPINEPHrine (AUVI-Q) 0.3 MG/0.3ML injection 2-pack, Inject 0.3 mLs (0.3 mg) into the muscle as needed for anaphylaxis, Disp: 2 each, Rfl: 1     Ferrous Sulfate (IRON) 325 (65 Fe) MG tablet, Take 325 mg by mouth, Disp: , Rfl:      fish oil-omega-3 fatty acids 1000 MG capsule, Take 1,000 mg by mouth, Disp: , Rfl:      montelukast (SINGULAIR) 10 MG tablet, 10mg daily beginning two days prior to rush immunotherapy and take the morning of rush immunotherapy., Disp: 3 tablet, Rfl: 0     Multiple Vitamin (MULTI-VITAMIN DAILY PO), Take 1 tablet by mouth, Disp: , Rfl:      naproxen sodium (ANAPROX) 220 MG tablet, Take 220 mg by mouth 2 times daily (with meals), Disp: , Rfl:      ORDER FOR ALLERGEN IMMUNOTHERAPY, Dust Mites DF. 10,000 AU/mL, HS  0.5 ml Dust Mites DP. 10,000 AU/mL, HS  0.5 ml  Syed Grass (Std) 100,000  BAU/mL, HS 0.4 ml Ragweed, Mix (Giant, Short) 1:20 w/v, HS 0.4 ml Diluent: HSA qs to 5ml, Disp: 5 mL, Rfl: prn     trimethoprim-polymyxin b (POLYTRIM) ophthalmic solution, Place 1 drop into both eyes 4 times daily., Disp: 1 Bottle, Rfl: 0  Immunization History   Administered Date(s) Administered     HepB 05/31/2011     MMR 05/31/2011     Mantoux Tuberculin Skin Test 05/31/2011     TDAP Vaccine (Adacel) 01/19/2011     Allergies   Allergen Reactions     Ativan Itching     Cephalosporins      itching     Dilaudid [Hydromorphone Hcl]      Itching     Hyoscyamine      itching     Oxycodone      Itching     Penicillins      itching     Sulfa Drugs      Rash and itching     Ultram [Tramadol]      itching     Vicodin [Acetaminophen]      itching         EXAM:   Constitutional:  Appears well-developed and well-nourished. No distress.   HEENT:   Head: Normocephalic.   Eyes: Conjunctivae are non-erythematous   Cardiovascular: Normal rate, regular rhythm and normal heart sounds. Exam reveals no gallop and no friction rub.   No murmur heard.  Respiratory: Effort normal and breath sounds normal. No respiratory distress. No wheezes. No rales.   Neuro: Oriented to person, place, and time.  Skin: Skin is warm and dry. No rash noted.   Psychiatric: Normal mood and affect.     Nursing note and vitals reviewed.    ASSESSMENT/PLAN:  Problem List Items Addressed This Visit        Respiratory    Allergic rhinitis due to dust mite - Primary     Nasal and ocular symptoms in the spring and fall. Symptoms when [place] to [place] regardless of time of the year.     Allergy testing positive for dust mites and grass. Equivocal for ragweed. Positive for ragweed on blood testing.      Cluster immunotherapy   The patient received blue 0.3, blue 0.4. Each dose was  by 30 minutes. Full report will be scanned into electronic medical record. The patient was in office for over 1.0 hours.          - Flonase 2 sprays/nostril daily.   -  Zyrtec as needed.   - Continue allergy shots.          Relevant Orders    RAPID DESENSITIZATION (Completed)    Seasonal allergic rhinitis due to pollen    Relevant Orders    RAPID DESENSITIZATION (Completed)          Chart documentation with Dragon Voice recognition Software. Although reviewed after completion, some words and grammatical errors may remain.    DO SHAVON LindsayAAI  Medical Director for Allergy/Immunology at [place], [place]

## 2020-12-18 NOTE — PROGRESS NOTES
Cluster Allergen Immunotherapy:    After explaining risks and benefits, and obtaining verbal and written consent, we proceeded with cluster immunotherapy.     VISIT  VIAL COLOR/STRENGTH  DOSES TO BE GIVEN    1  GREEN (1:1000), BLUE (1:100)  GREEN 0.1, GREEN 0.4, BLUE 0.1    2  BLUE (1:100), YELLOW (1:10)  BLUE 0.2, BLUE 0.4, YELLOW 0.05    3  YELLOW (1:10)  YELLOW 0.1, YELLOW 0.15, YELLOW 0.25    4  YELLOW (1:10)  YELLOW 0.35, YELLOW 0.5    5  RED (1:1)  RED 0.05, RED 0.1    6  RED (1:1)  RED 0.15, RED 0.2    7  RED (1:1)  RED 0.3, RED 0.4    8  RED (1:1)  RED 0.5        VISIT 3    Time Injection Given: 918  Yellow 1:10   Grass, Dust Mite, Weeds  0.05 mL      Time Injection Given: 952  Yellow 1:10   Grass, Dust Mite, Weeds  0.1 mL      Start Time: 918  End Time: 1024        VITALS   Time BP Pulse pOx Reaction Treatment   950 113/73 68 98 - -   1024 118/77 72 98 - -

## 2020-12-22 ENCOUNTER — OFFICE VISIT (OUTPATIENT)
Dept: ALLERGY | Facility: OTHER | Age: 45
End: 2020-12-22
Payer: COMMERCIAL

## 2020-12-22 VITALS
WEIGHT: 183 LBS | SYSTOLIC BLOOD PRESSURE: 102 MMHG | HEIGHT: 64 IN | HEART RATE: 75 BPM | DIASTOLIC BLOOD PRESSURE: 64 MMHG | OXYGEN SATURATION: 99 % | BODY MASS INDEX: 31.24 KG/M2

## 2020-12-22 DIAGNOSIS — J30.1 SEASONAL ALLERGIC RHINITIS DUE TO POLLEN: Primary | ICD-10-CM

## 2020-12-22 DIAGNOSIS — J30.89 ALLERGIC RHINITIS DUE TO DUST MITE: ICD-10-CM

## 2020-12-22 PROCEDURE — 99207 PR NO CHARGE LOS: CPT | Performed by: ALLERGY & IMMUNOLOGY

## 2020-12-22 PROCEDURE — 95180 RAPID DESENSITIZATION: CPT | Performed by: ALLERGY & IMMUNOLOGY

## 2020-12-22 ASSESSMENT — MIFFLIN-ST. JEOR: SCORE: 1455.08

## 2020-12-22 NOTE — LETTER
12/22/2020         RE: Sun Andrade  [address] [facility] [address]        Dear Colleague,    Thank you for referring your patient, Sun Andrade, to the [facility]. Please see a copy of my visit note below.    Cluster Allergen Immunotherapy:    After explaining risks and benefits, and obtaining verbal and written consent, we proceeded with cluster immunotherapy.     VISIT  VIAL COLOR/STRENGTH  DOSES TO BE GIVEN    1  GREEN (1:1000), BLUE (1:100)  GREEN 0.1, GREEN 0.4, BLUE 0.1    2  BLUE (1:100), YELLOW (1:10)  BLUE 0.2, BLUE 0.4, YELLOW 0.05    3  YELLOW (1:10)  YELLOW 0.1, YELLOW 0.15, YELLOW 0.25    4  YELLOW (1:10)  YELLOW 0.35, YELLOW 0.5    5  RED (1:1)  RED 0.05, RED 0.1    6  RED (1:1)  RED 0.15, RED 0.2    7  RED (1:1)  RED 0.3, RED 0.4    8  RED (1:1)  RED 0.5        VISIT 3    Time Injection Given: 918  Yellow 1:10   Grass, Dust Mite, Weeds  0.05 mL      Time Injection Given: 952  Yellow 1:10   Grass, Dust Mite, Weeds  0.1 mL      Start Time: 918  End Time: 1024        VITALS   Time BP Pulse pOx Reaction Treatment   950 113/73 68 98 - -   1024 118/77 72 98 - -         Sun Andrade is a 45 year old White female with previous medical history significant for allergic rhinitis who returns for a follow up visit.     Patient presents today for cluster immunotherapy. The patient is currently in a good state of health. No recent fevers, chills, cough, wheezing, shortness of breath, skin rash, angioedema, nausea, vomiting or diarrhea. The risks and benefits were discussed and the patient/patient's family wishes to proceed. The consent was signed.      Past Medical History:   Diagnosis Date     Anxiety 11/18/2008     Attention deficit disorder of adult 11/18/2008     Depression 11/18/2008     Enlargement - tonsil/adenoid 11/18/2008     Itching 11/18/2008    in response to multiple medications-- see allergy list     Sleep apnea     mild; felt related to adenoids; no  treatment     Family History   Problem Relation Age of Onset     Depression Mother      Breast Cancer Maternal Grandmother      Depression Maternal Grandmother      Diabetes Maternal Grandfather      C.A.D. Paternal Grandfather         CHF     Diabetes Paternal Grandmother      Asthma Daughter      Past Surgical History:   Procedure Laterality Date     C  DELIVERY ONLY       LAPAROSCOPY,TUBAL CAUTERY       SURGICAL HISTORY OF -   09    discectomy L5-S1 for herniated disc       REVIEW OF SYSTEMS:  General: negative for weight gain. negative for weight loss. negative for changes in sleep.   Ears: negative for fullness. negative for hearing loss. negative for dizziness.   Nose: negative for snoring.negative for changes in smell. negative for drainage.   Eyes: negative for eye watering. negative for eye itching. negative for vision changes. negative for eye redness.  Throat: negative for hoarseness. negative for sore throat. negative for trouble swallowing.   Lungs: negative for shortness of breath.negative for wheezing. negative for sputum production.   Cardiovascular: negative for chest pain. negative for swelling of ankles. negative for fast or irregular heartbeat.   Gastrointestinal: negative for nausea. negative for heartburn. negative for acid reflux.   Musculoskeletal: negative for joint pain. negative for joint stiffness. negative for joint swelling.   Neurologic: negative for seizures. negative for fainting. negative for weakness.   Psychiatric: negative for changes in mood. negative for anxiety.   Endocrine: negative for cold intolerance. negative for heat intolerance. negative for tremors.   Lymphatic: negative for lower extremity swelling. negative for lymph node swelling.   Hematologic: negative for easy bruising. negative for easy bleeding.  Integumentary: negative for rash. negative for scaling. negative for nail changes.       Current Outpatient Medications:      Ascorbic Acid (VITAMIN  C) 100 MG CHEW, , Disp: , Rfl:      buPROPion (WELLBUTRIN SR) 150 MG 12 hr tablet, Take 150 mg by mouth, Disp: , Rfl:      calcium-vitamin D (OSCAL) 250-125 MG-UNIT TABS per tablet, Take 1 tablet by mouth 2 times daily, Disp: , Rfl:      cetirizine (ZYRTEC) 10 MG tablet, Take 10 mg by mouth daily, Disp: , Rfl:      EPINEPHrine (AUVI-Q) 0.3 MG/0.3ML injection 2-pack, Inject 0.3 mLs (0.3 mg) into the muscle as needed for anaphylaxis, Disp: 2 each, Rfl: 1     Ferrous Sulfate (IRON) 325 (65 Fe) MG tablet, Take 325 mg by mouth, Disp: , Rfl:      fish oil-omega-3 fatty acids 1000 MG capsule, Take 1,000 mg by mouth, Disp: , Rfl:      montelukast (SINGULAIR) 10 MG tablet, 10mg daily beginning two days prior to rush immunotherapy and take the morning of rush immunotherapy., Disp: 3 tablet, Rfl: 0     Multiple Vitamin (MULTI-VITAMIN DAILY PO), Take 1 tablet by mouth, Disp: , Rfl:      naproxen sodium (ANAPROX) 220 MG tablet, Take 220 mg by mouth 2 times daily (with meals), Disp: , Rfl:      ORDER FOR ALLERGEN IMMUNOTHERAPY, Dust Mites DF. 10,000 AU/mL, HS  0.5 ml Dust Mites DP. 10,000 AU/mL, HS  0.5 ml  Syed Grass (Std) 100,000 BAU/mL, HS 0.4 ml Ragweed, Mix (Giant, Short) 1:20 w/v, HS 0.4 ml Diluent: HSA qs to 5ml, Disp: 5 mL, Rfl: prn     trimethoprim-polymyxin b (POLYTRIM) ophthalmic solution, Place 1 drop into both eyes 4 times daily., Disp: 1 Bottle, Rfl: 0  Immunization History   Administered Date(s) Administered     HepB 05/31/2011     MMR 05/31/2011     Mantoux Tuberculin Skin Test 05/31/2011     TDAP Vaccine (Adacel) 01/19/2011     Allergies   Allergen Reactions     Ativan Itching     Cephalosporins      itching     Dilaudid [Hydromorphone Hcl]      Itching     Hyoscyamine      itching     Oxycodone      Itching     Penicillins      itching     Sulfa Drugs      Rash and itching     Ultram [Tramadol]      itching     Vicodin [Acetaminophen]      itching         EXAM:   Constitutional:  Appears well-developed and  well-nourished. No distress.   HEENT:   Head: Normocephalic.   Neuro: Oriented to person, place, and time.  Skin: Skin is warm and dry. No rash noted.   Psychiatric: Normal mood and affect.     Nursing note and vitals reviewed.    ASSESSMENT/PLAN:  Problem List Items Addressed This Visit        Respiratory    Allergic rhinitis due to dust mite     Nasal and ocular symptoms in the spring and fall. Symptoms when [place] to [place] regardless of time of the year.     Allergy testing positive for dust mites and grass. Equivocal for ragweed. Positive for ragweed on blood testing.     Cluster immunotherapy   The patient received yellow 0.05, yellow 0.1. Each dose was  by 30 minutes. Full report will be scanned into electronic medical record. The patient was in office for over 1.0 hours.       - Flonase 2 sprays/nostril daily.   - Zyrtec as needed.   - Continue allergy shots.          Relevant Orders    RAPID DESENSITIZATION (Completed)    Seasonal allergic rhinitis due to pollen - Primary    Relevant Orders    RAPID DESENSITIZATION (Completed)          Chart documentation with Dragon Voice recognition Software. Although reviewed after completion, some words and grammatical errors may remain.    Ariel Espinoza DO FAAAAI  Medical Director for Allergy/Immunology at [place], [place]        Again, thank you for allowing me to participate in the care of your patient.        Sincerely,        Ariel Espinoza DO

## 2020-12-22 NOTE — PATIENT INSTRUCTIONS
Allergy Staff Appt Hours Shot Hours Locations    Physician     Ariel Espinoza DO       Support Staff     NARGIS Michaels CMA  Tuesday:        [place] 7-5 Wednesday:        [place]: 7-5 Thursday:                    [place] 7-6     Friday:  [place]  7-2   [place]        Thursday: 8-5:20        Friday: 7-12     [place]        Tuesday: 7- 3:20 Wednesday: 7-4:20     [place] Monday: 7-2:20 Tuesday: 9-5:20         [facility]  [address] [place] [address]  Appt Line: [phone]  Allergy RN:  [phone]    [facility]  [address] [place] [address]  Appt Line: [phone]  Allergy RN:  [phone]       Important Scheduling Information  Aspirin Desensitization: Appt will last 2 clinic days. Please call the Allergy RN line for your clinic to schedule. Discontinue antihistamines 7 days prior to the appointment.     Food Challenges: Appt will last 3-4 hours. Please call the Allergy RN line for your clinic to schedule. Discontinue antihistamines 7 days prior to the appointment.     Penicillin Testing: Appt will last 2-3 hours. Please call the Allergy RN line for your clinic to schedule. Discontinue antihistamines 7 days prior to the appointment.     Skin Testing: Appt will about 40 minutes. Call the appointment line for your clinic to schedule. Discontinue antihistamines 7 days prior to the appointment.     Venom Testing: Appt will last 2-3 hours. Please call the Allergy RN line for your clinic to schedule. Discontinue antihistamines 7 days prior to the appointment.     Thank you for trusting us with your Allergy, Asthma, and Immunology care. Please feel free to contact us with any questions or concerns you may have.

## 2020-12-22 NOTE — PROGRESS NOTES
Sun Andrade is a 45 year old White female with previous medical history significant for allergic rhinitis who returns for a follow up visit.     Patient presents today for cluster immunotherapy. The patient is currently in a good state of health. No recent fevers, chills, cough, wheezing, shortness of breath, skin rash, angioedema, nausea, vomiting or diarrhea. The risks and benefits were discussed and the patient/patient's family wishes to proceed. The consent was signed.      Past Medical History:   Diagnosis Date     Anxiety 2008     Attention deficit disorder of adult 2008     Depression 2008     Enlargement - tonsil/adenoid 2008     Itching 2008    in response to multiple medications-- see allergy list     Sleep apnea     mild; felt related to adenoids; no treatment     Family History   Problem Relation Age of Onset     Depression Mother      Breast Cancer Maternal Grandmother      Depression Maternal Grandmother      Diabetes Maternal Grandfather      C.A.D. Paternal Grandfather         CHF     Diabetes Paternal Grandmother      Asthma Daughter      Past Surgical History:   Procedure Laterality Date     C  DELIVERY ONLY       LAPAROSCOPY,TUBAL CAUTERY       SURGICAL HISTORY OF -   09    discectomy L5-S1 for herniated disc       REVIEW OF SYSTEMS:  General: negative for weight gain. negative for weight loss. negative for changes in sleep.   Ears: negative for fullness. negative for hearing loss. negative for dizziness.   Nose: negative for snoring.negative for changes in smell. negative for drainage.   Eyes: negative for eye watering. negative for eye itching. negative for vision changes. negative for eye redness.  Throat: negative for hoarseness. negative for sore throat. negative for trouble swallowing.   Lungs: negative for shortness of breath.negative for wheezing. negative for sputum production.   Cardiovascular: negative for chest pain. negative for  swelling of ankles. negative for fast or irregular heartbeat.   Gastrointestinal: negative for nausea. negative for heartburn. negative for acid reflux.   Musculoskeletal: negative for joint pain. negative for joint stiffness. negative for joint swelling.   Neurologic: negative for seizures. negative for fainting. negative for weakness.   Psychiatric: negative for changes in mood. negative for anxiety.   Endocrine: negative for cold intolerance. negative for heat intolerance. negative for tremors.   Lymphatic: negative for lower extremity swelling. negative for lymph node swelling.   Hematologic: negative for easy bruising. negative for easy bleeding.  Integumentary: negative for rash. negative for scaling. negative for nail changes.       Current Outpatient Medications:      Ascorbic Acid (VITAMIN C) 100 MG CHEW, , Disp: , Rfl:      buPROPion (WELLBUTRIN SR) 150 MG 12 hr tablet, Take 150 mg by mouth, Disp: , Rfl:      calcium-vitamin D (OSCAL) 250-125 MG-UNIT TABS per tablet, Take 1 tablet by mouth 2 times daily, Disp: , Rfl:      cetirizine (ZYRTEC) 10 MG tablet, Take 10 mg by mouth daily, Disp: , Rfl:      EPINEPHrine (AUVI-Q) 0.3 MG/0.3ML injection 2-pack, Inject 0.3 mLs (0.3 mg) into the muscle as needed for anaphylaxis, Disp: 2 each, Rfl: 1     Ferrous Sulfate (IRON) 325 (65 Fe) MG tablet, Take 325 mg by mouth, Disp: , Rfl:      fish oil-omega-3 fatty acids 1000 MG capsule, Take 1,000 mg by mouth, Disp: , Rfl:      montelukast (SINGULAIR) 10 MG tablet, 10mg daily beginning two days prior to rush immunotherapy and take the morning of rush immunotherapy., Disp: 3 tablet, Rfl: 0     Multiple Vitamin (MULTI-VITAMIN DAILY PO), Take 1 tablet by mouth, Disp: , Rfl:      naproxen sodium (ANAPROX) 220 MG tablet, Take 220 mg by mouth 2 times daily (with meals), Disp: , Rfl:      ORDER FOR ALLERGEN IMMUNOTHERAPY, Dust Mites DF. 10,000 AU/mL, HS  0.5 ml Dust Mites DP. 10,000 AU/mL, HS  0.5 ml  Syed Grass (Std) 100,000  BAU/mL, HS 0.4 ml Ragweed, Mix (Giant, Short) 1:20 w/v, HS 0.4 ml Diluent: HSA qs to 5ml, Disp: 5 mL, Rfl: prn     trimethoprim-polymyxin b (POLYTRIM) ophthalmic solution, Place 1 drop into both eyes 4 times daily., Disp: 1 Bottle, Rfl: 0  Immunization History   Administered Date(s) Administered     HepB 05/31/2011     MMR 05/31/2011     Mantoux Tuberculin Skin Test 05/31/2011     TDAP Vaccine (Adacel) 01/19/2011     Allergies   Allergen Reactions     Ativan Itching     Cephalosporins      itching     Dilaudid [Hydromorphone Hcl]      Itching     Hyoscyamine      itching     Oxycodone      Itching     Penicillins      itching     Sulfa Drugs      Rash and itching     Ultram [Tramadol]      itching     Vicodin [Acetaminophen]      itching         EXAM:   Constitutional:  Appears well-developed and well-nourished. No distress.   HEENT:   Head: Normocephalic.   Neuro: Oriented to person, place, and time.  Skin: Skin is warm and dry. No rash noted.   Psychiatric: Normal mood and affect.     Nursing note and vitals reviewed.    ASSESSMENT/PLAN:  Problem List Items Addressed This Visit        Respiratory    Allergic rhinitis due to dust mite     Nasal and ocular symptoms in the spring and fall. Symptoms when [place] to [place] regardless of time of the year.     Allergy testing positive for dust mites and grass. Equivocal for ragweed. Positive for ragweed on blood testing.     Cluster immunotherapy   The patient received yellow 0.05, yellow 0.1. Each dose was  by 30 minutes. Full report will be scanned into electronic medical record. The patient was in office for over 1.0 hours.       - Flonase 2 sprays/nostril daily.   - Zyrtec as needed.   - Continue allergy shots.          Relevant Orders    RAPID DESENSITIZATION (Completed)    Seasonal allergic rhinitis due to pollen - Primary    Relevant Orders    RAPID DESENSITIZATION (Completed)          Chart documentation with Dragon Voice recognition Software.  Although reviewed after completion, some words and grammatical errors may remain.    DO SHAVON LindsayAAI  Medical Director for Allergy/Immunology at [facility] and [place]

## 2020-12-22 NOTE — ASSESSMENT & PLAN NOTE
Nasal and ocular symptoms in the spring and fall. Symptoms when travels to [place] regardless of time of the year.     Allergy testing positive for dust mites and grass. Equivocal for ragweed. Positive for ragweed on blood testing.     Cluster immunotherapy   The patient received yellow 0.05, yellow 0.1. Each dose was  by 30 minutes. Full report will be scanned into electronic medical record. The patient was in office for over 1.0 hours.       - Flonase 2 sprays/nostril daily.   - Zyrtec as needed.   - Continue allergy shots.

## 2020-12-29 ENCOUNTER — NURSE TRIAGE (OUTPATIENT)
Dept: NURSING | Facility: CLINIC | Age: 45
End: 2020-12-29

## 2020-12-29 ENCOUNTER — TELEPHONE (OUTPATIENT)
Dept: ALLERGY | Facility: OTHER | Age: 45
End: 2020-12-29

## 2020-12-29 ENCOUNTER — OFFICE VISIT (OUTPATIENT)
Dept: ALLERGY | Facility: OTHER | Age: 45
End: 2020-12-29
Payer: COMMERCIAL

## 2020-12-29 VITALS — SYSTOLIC BLOOD PRESSURE: 117 MMHG | DIASTOLIC BLOOD PRESSURE: 72 MMHG | HEART RATE: 71 BPM | OXYGEN SATURATION: 98 %

## 2020-12-29 DIAGNOSIS — J30.89 ALLERGIC RHINITIS DUE TO DUST MITE: ICD-10-CM

## 2020-12-29 DIAGNOSIS — J30.1 SEASONAL ALLERGIC RHINITIS DUE TO POLLEN: Primary | ICD-10-CM

## 2020-12-29 PROCEDURE — 95180 RAPID DESENSITIZATION: CPT | Performed by: ALLERGY & IMMUNOLOGY

## 2020-12-29 PROCEDURE — 99207 PR NO CHARGE LOS: CPT | Performed by: ALLERGY & IMMUNOLOGY

## 2020-12-29 NOTE — TELEPHONE ENCOUNTER
Received call from patient reporting that when she arrived at work she she began to have scalp itching, and facial flushing/warmth. Patient denies shortness of breath, wheezing, cough, nasal drainage, facial swelling, hives, vomiting, diarrhea. Patient reports that when she began having symptoms she took 2 Benadryl. Advised patient to use epi-pen and go to ED if symptoms worsen or if she develops any of the symptoms listed above. Patient verbalized understanding and will call to update if symptoms progress.     Patient's last dose was 0.25 in yellow vial.     Routing to provider to update/advise.     Sun Alcala, BSN, RN

## 2020-12-29 NOTE — ASSESSMENT & PLAN NOTE
Nasal and ocular symptoms in the spring and fall. Symptoms when travels to [place] clim[place] regardless of time of the year.     Allergy testing positive for dust mites and grass. Equivocal for ragweed. Positive for ragweed on blood testing.    Cluster immunotherapy   The patient received yellow 0.15, and yellow 0.25. Each dose was  by 30 minutes. Full report will be scanned into electronic medical record. The patient was in office for over 1.0 hours.         - Flonase 2 sprays/nostril daily.   - Zyrtec as needed.   - Continue allergy shots.

## 2020-12-29 NOTE — TELEPHONE ENCOUNTER
Lets stop Cluster shots for this patient. 2nd reaction with cluster. She can continue on shots. Have her drop to yellow 0.1ml at next shot appointment and build conventionally from there. Premedicate with Zyrtec the night before shots, the morning of shots and evening after shots. Thanks.     Dr. Espinoza

## 2020-12-29 NOTE — LETTER
2020         RE: Sun Andrade  [address]  [facility] [address]        Dear Colleague,    Thank you for referring your patient, Sun Andrade, to the [facility]. Please see a copy of my visit note below.    Sun Andrade is a 45 year old White female with previous medical history significant for allergic rhinitis who returns for a follow up visit.    Patient presents today for cluster immunotherapy. The patient is currently in a good state of health. No recent fevers, chills, cough, wheezing, shortness of breath, skin rash, angioedema, nausea, vomiting or diarrhea. The risks and benefits were discussed and the patient/patient's family wishes to proceed. The consent was signed.    Past Medical History:   Diagnosis Date     Anxiety 2008     Attention deficit disorder of adult 2008     Depression 2008     Enlargement - tonsil/adenoid 2008     Itching 2008    in response to multiple medications-- see allergy list     Sleep apnea     mild; felt related to adenoids; no treatment     Family History   Problem Relation Age of Onset     Depression Mother      Breast Cancer Maternal Grandmother      Depression Maternal Grandmother      Diabetes Maternal Grandfather      C.A.D. Paternal Grandfather         CHF     Diabetes Paternal Grandmother      Asthma Daughter      Past Surgical History:   Procedure Laterality Date     C  DELIVERY ONLY       LAPAROSCOPY,TUBAL CAUTERY       SURGICAL HISTORY OF -   09    discectomy L5-S1 for herniated disc       REVIEW OF SYSTEMS:  General: negative for weight gain. negative for weight loss. negative for changes in sleep.   Ears: negative for fullness. negative for hearing loss. negative for dizziness.   Nose: negative for snoring.negative for changes in smell. negative for drainage.   Eyes: negative for eye watering. negative for eye itching. negative for vision changes. negative for eye  redness.  Throat: negative for hoarseness. negative for sore throat. negative for trouble swallowing.   Lungs: negative for shortness of breath.negative for wheezing. negative for sputum production.   Cardiovascular: negative for chest pain. negative for swelling of ankles. negative for fast or irregular heartbeat.   Gastrointestinal: negative for nausea. negative for heartburn. negative for acid reflux.   Musculoskeletal: negative for joint pain. negative for joint stiffness. negative for joint swelling.   Neurologic: negative for seizures. negative for fainting. negative for weakness.   Psychiatric: negative for changes in mood. negative for anxiety.   Endocrine: negative for cold intolerance. negative for heat intolerance. negative for tremors.   Lymphatic: negative for lower extremity swelling. negative for lymph node swelling.   Hematologic: negative for easy bruising. negative for easy bleeding.  Integumentary: negative for rash. negative for scaling. negative for nail changes.       Current Outpatient Medications:      Ascorbic Acid (VITAMIN C) 100 MG CHEW, , Disp: , Rfl:      buPROPion (WELLBUTRIN SR) 150 MG 12 hr tablet, Take 150 mg by mouth, Disp: , Rfl:      calcium-vitamin D (OSCAL) 250-125 MG-UNIT TABS per tablet, Take 1 tablet by mouth 2 times daily, Disp: , Rfl:      cetirizine (ZYRTEC) 10 MG tablet, Take 10 mg by mouth daily, Disp: , Rfl:      Ferrous Sulfate (IRON) 325 (65 Fe) MG tablet, Take 325 mg by mouth, Disp: , Rfl:      fish oil-omega-3 fatty acids 1000 MG capsule, Take 1,000 mg by mouth, Disp: , Rfl:      montelukast (SINGULAIR) 10 MG tablet, 10mg daily beginning two days prior to rush immunotherapy and take the morning of rush immunotherapy., Disp: 3 tablet, Rfl: 0     Multiple Vitamin (MULTI-VITAMIN DAILY PO), Take 1 tablet by mouth, Disp: , Rfl:      naproxen sodium (ANAPROX) 220 MG tablet, Take 220 mg by mouth 2 times daily (with meals), Disp: , Rfl:      ORDER FOR ALLERGEN  IMMUNOTHERAPY, Dust Mites DF. 10,000 AU/mL, HS  0.5 ml Dust Mites DP. 10,000 AU/mL, HS  0.5 ml  Syed Grass (Std) 100,000 BAU/mL, HS 0.4 ml Ragweed, Mix (Giant, Short) 1:20 w/v, HS 0.4 ml Diluent: HSA qs to 5ml, Disp: 5 mL, Rfl: prn     trimethoprim-polymyxin b (POLYTRIM) ophthalmic solution, Place 1 drop into both eyes 4 times daily., Disp: 1 Bottle, Rfl: 0     EPINEPHrine (AUVI-Q) 0.3 MG/0.3ML injection 2-pack, Inject 0.3 mLs (0.3 mg) into the muscle as needed for anaphylaxis (Patient not taking: Reported on 12/29/2020), Disp: 2 each, Rfl: 1  Immunization History   Administered Date(s) Administered     HepB 05/31/2011     MMR 05/31/2011     Mantoux Tuberculin Skin Test 05/31/2011     TDAP Vaccine (Adacel) 01/19/2011     Allergies   Allergen Reactions     Ativan Itching     Cephalosporins      itching     Dilaudid [Hydromorphone Hcl]      Itching     Hyoscyamine      itching     Oxycodone      Itching     Penicillins      itching     Sulfa Drugs      Rash and itching     Ultram [Tramadol]      itching     Vicodin [Acetaminophen]      itching         EXAM:   Constitutional:  Appears well-developed and well-nourished. No distress.   HEENT:   Head: Normocephalic.   Cardiovascular: Normal rate, regular rhythm and normal heart sounds. Exam reveals no gallop and no friction rub.   No murmur heard.  Respiratory: Effort normal and breath sounds normal. No respiratory distress. No wheezes. No rales.   Neuro: Oriented to person, place, and time.  Skin: Skin is warm and dry. No rash noted.   Psychiatric: Normal mood and affect.     Nursing note and vitals reviewed.      WORKUP: Cluster Immunotherapy    Cluster Allergen Immunotherapy:    After explaining risks and benefits, and obtaining verbal and written consent, we proceeded with cluster immunotherapy.     VISIT  VIAL COLOR/STRENGTH  DOSES TO BE GIVEN    1  GREEN (1:1000), BLUE (1:100)  GREEN 0.1, GREEN 0.2, GREEN 0.4, BLUE 0.1    2  BLUE (1:100), YELLOW (1:10)  BLUE 0.2,  BLUE 0.4, YELLOW 0.05    3  YELLOW (1:10)  YELLOW 0.1, YELLOW 0.15, YELLOW 0.25    4  YELLOW (1:10)  YELLOW 0.35, YELLOW 0.5    5  RED (1:1)  RED 0.05, RED 0.1    6  RED (1:1)  RED 0.15, RED 0.2    7  RED (1:1)  RED 0.3, RED 0.4    8  RED (1:1)  RED 0.5        VISIT 3    Time Injection Given: 9:08  Yellow 1:10   Grass, Dust Mite, Weeds    0.15 mL    Time Injection Given: 9:44  Yellow 1:10   Grass, Dust Mite, Weeds    0.25 mL      Start Time: 9:08  End Time: 10:10        VITALS   Time BP Pulse pOx Reaction Treatment   9:40 108/70 75 98% none n/a   10:10 118/73 73 98% none n/a                                       ASSESSMENT/PLAN:  Problem List Items Addressed This Visit        Respiratory    Allergic rhinitis due to dust mite     Nasal and ocular symptoms in the spring and fall. Symptoms when travels to [place] regardless of time of the year.     Allergy testing positive for dust mites and grass. Equivocal for ragweed. Positive for ragweed on blood testing.    Cluster immunotherapy   The patient received yellow 0.15, and yellow 0.25. Each dose was  by 30 minutes. Full report will be scanned into electronic medical record. The patient was in office for over 1.0 hours.         - Flonase 2 sprays/nostril daily.   - Zyrtec as needed.   - Continue allergy shots.          Relevant Orders    RAPID DESENSITIZATION (Completed)    Seasonal allergic rhinitis due to pollen - Primary    Relevant Orders    RAPID DESENSITIZATION (Completed)          Chart documentation with Dragon Voice recognition Software. Although reviewed after completion, some words and grammatical errors may remain.    Ariel Espinoza DO FAAAAI  Medical Director for Allergy/Immunology at [place], [place]        Again, thank you for allowing me to participate in the care of your patient.        Sincerely,        Ariel Espinoza DO

## 2020-12-29 NOTE — NURSING NOTE
Prior to initiation of cluster immunotherapy RN ensured that patient was feeling healthy, has premedicated with Zyrtec or Allegra yesterday twice daily and this morning. RN also ensured that patient has unexpired Epi-Pen, had no new medication changes, and did not have a reaction after the last allergy shots were given. If the patient has asthma it is well-controlled. The patient has not been ill in the past 7 days. Patient was given allergy injections per cluster immunotherapy protocol 30 minutes apart and vital signs were monitored. Patient was monitored in clinic for 30 minutes after last injection was given and assessed by provider before discharging.     Sun Alcala RN

## 2020-12-29 NOTE — PROGRESS NOTES
Sun Andrade is a 45 year old White female with previous medical history significant for allergic rhinitis who returns for a follow up visit.    Patient presents today for cluster immunotherapy. The patient is currently in a good state of health. No recent fevers, chills, cough, wheezing, shortness of breath, skin rash, angioedema, nausea, vomiting or diarrhea. The risks and benefits were discussed and the patient/patient's family wishes to proceed. The consent was signed.    Past Medical History:   Diagnosis Date     Anxiety 2008     Attention deficit disorder of adult 2008     Depression 2008     Enlargement - tonsil/adenoid 2008     Itching 2008    in response to multiple medications-- see allergy list     Sleep apnea     mild; felt related to adenoids; no treatment     Family History   Problem Relation Age of Onset     Depression Mother      Breast Cancer Maternal Grandmother      Depression Maternal Grandmother      Diabetes Maternal Grandfather      C.A.D. Paternal Grandfather         CHF     Diabetes Paternal Grandmother      Asthma Daughter      Past Surgical History:   Procedure Laterality Date     C  DELIVERY ONLY       LAPAROSCOPY,TUBAL CAUTERY       SURGICAL HISTORY OF -   09    discectomy L5-S1 for herniated disc       REVIEW OF SYSTEMS:  General: negative for weight gain. negative for weight loss. negative for changes in sleep.   Ears: negative for fullness. negative for hearing loss. negative for dizziness.   Nose: negative for snoring.negative for changes in smell. negative for drainage.   Eyes: negative for eye watering. negative for eye itching. negative for vision changes. negative for eye redness.  Throat: negative for hoarseness. negative for sore throat. negative for trouble swallowing.   Lungs: negative for shortness of breath.negative for wheezing. negative for sputum production.   Cardiovascular: negative for chest pain. negative for  swelling of ankles. negative for fast or irregular heartbeat.   Gastrointestinal: negative for nausea. negative for heartburn. negative for acid reflux.   Musculoskeletal: negative for joint pain. negative for joint stiffness. negative for joint swelling.   Neurologic: negative for seizures. negative for fainting. negative for weakness.   Psychiatric: negative for changes in mood. negative for anxiety.   Endocrine: negative for cold intolerance. negative for heat intolerance. negative for tremors.   Lymphatic: negative for lower extremity swelling. negative for lymph node swelling.   Hematologic: negative for easy bruising. negative for easy bleeding.  Integumentary: negative for rash. negative for scaling. negative for nail changes.       Current Outpatient Medications:      Ascorbic Acid (VITAMIN C) 100 MG CHEW, , Disp: , Rfl:      buPROPion (WELLBUTRIN SR) 150 MG 12 hr tablet, Take 150 mg by mouth, Disp: , Rfl:      calcium-vitamin D (OSCAL) 250-125 MG-UNIT TABS per tablet, Take 1 tablet by mouth 2 times daily, Disp: , Rfl:      cetirizine (ZYRTEC) 10 MG tablet, Take 10 mg by mouth daily, Disp: , Rfl:      Ferrous Sulfate (IRON) 325 (65 Fe) MG tablet, Take 325 mg by mouth, Disp: , Rfl:      fish oil-omega-3 fatty acids 1000 MG capsule, Take 1,000 mg by mouth, Disp: , Rfl:      montelukast (SINGULAIR) 10 MG tablet, 10mg daily beginning two days prior to rush immunotherapy and take the morning of rush immunotherapy., Disp: 3 tablet, Rfl: 0     Multiple Vitamin (MULTI-VITAMIN DAILY PO), Take 1 tablet by mouth, Disp: , Rfl:      naproxen sodium (ANAPROX) 220 MG tablet, Take 220 mg by mouth 2 times daily (with meals), Disp: , Rfl:      ORDER FOR ALLERGEN IMMUNOTHERAPY, Dust Mites DF. 10,000 AU/mL, HS  0.5 ml Dust Mites DP. 10,000 AU/mL, HS  0.5 ml  Syed Grass (Std) 100,000 BAU/mL, HS 0.4 ml Ragweed, Mix (Giant, Short) 1:20 w/v, HS 0.4 ml Diluent: HSA qs to 5ml, Disp: 5 mL, Rfl: prn     trimethoprim-polymyxin b  (POLYTRIM) ophthalmic solution, Place 1 drop into both eyes 4 times daily., Disp: 1 Bottle, Rfl: 0     EPINEPHrine (AUVI-Q) 0.3 MG/0.3ML injection 2-pack, Inject 0.3 mLs (0.3 mg) into the muscle as needed for anaphylaxis (Patient not taking: Reported on 12/29/2020), Disp: 2 each, Rfl: 1  Immunization History   Administered Date(s) Administered     HepB 05/31/2011     MMR 05/31/2011     Mantoux Tuberculin Skin Test 05/31/2011     TDAP Vaccine (Adacel) 01/19/2011     Allergies   Allergen Reactions     Ativan Itching     Cephalosporins      itching     Dilaudid [Hydromorphone Hcl]      Itching     Hyoscyamine      itching     Oxycodone      Itching     Penicillins      itching     Sulfa Drugs      Rash and itching     Ultram [Tramadol]      itching     Vicodin [Acetaminophen]      itching         EXAM:   Constitutional:  Appears well-developed and well-nourished. No distress.   HEENT:   Head: Normocephalic.   Cardiovascular: Normal rate, regular rhythm and normal heart sounds. Exam reveals no gallop and no friction rub.   No murmur heard.  Respiratory: Effort normal and breath sounds normal. No respiratory distress. No wheezes. No rales.   Neuro: Oriented to person, place, and time.  Skin: Skin is warm and dry. No rash noted.   Psychiatric: Normal mood and affect.     Nursing note and vitals reviewed.      WORKUP: Cluster Immunotherapy    Cluster Allergen Immunotherapy:    After explaining risks and benefits, and obtaining verbal and written consent, we proceeded with cluster immunotherapy.     VISIT  VIAL COLOR/STRENGTH  DOSES TO BE GIVEN    1  GREEN (1:1000), BLUE (1:100)  GREEN 0.1, GREEN 0.2, GREEN 0.4, BLUE 0.1    2  BLUE (1:100), YELLOW (1:10)  BLUE 0.2, BLUE 0.4, YELLOW 0.05    3  YELLOW (1:10)  YELLOW 0.1, YELLOW 0.15, YELLOW 0.25    4  YELLOW (1:10)  YELLOW 0.35, YELLOW 0.5    5  RED (1:1)  RED 0.05, RED 0.1    6  RED (1:1)  RED 0.15, RED 0.2    7  RED (1:1)  RED 0.3, RED 0.4    8  RED (1:1)  RED 0.5         VISIT 3    Time Injection Given: 9:08  Yellow 1:10   Grass, Dust Mite, Weeds    0.15 mL    Time Injection Given: 9:44  Yellow 1:10   Grass, Dust Mite, Weeds    0.25 mL      Start Time: 9:08  End Time: 10:10        VITALS   Time BP Pulse pOx Reaction Treatment   9:40 108/70 75 98% none n/a   10:10 118/73 73 98% none n/a                                       ASSESSMENT/PLAN:  Problem List Items Addressed This Visit        Respiratory    Allergic rhinitis due to dust mite     Nasal and ocular symptoms in the spring and fall. Symptoms when travels to [place] regardless of time of the year.     Allergy testing positive for dust mites and grass. Equivocal for ragweed. Positive for ragweed on blood testing.    Cluster immunotherapy   The patient received yellow 0.15, and yellow 0.25. Each dose was  by 30 minutes. Full report will be scanned into electronic medical record. The patient was in office for over 1.0 hours.         - Flonase 2 sprays/nostril daily.   - Zyrtec as needed.   - Continue allergy shots.          Relevant Orders    RAPID DESENSITIZATION (Completed)    Seasonal allergic rhinitis due to pollen - Primary    Relevant Orders    RAPID DESENSITIZATION (Completed)          Chart documentation with Dragon Voice recognition Software. Although reviewed after completion, some words and grammatical errors may remain.    DO SHAVON LindsayAAI  Medical Director for Allergy/Immunology at [place], [place]

## 2020-12-29 NOTE — TELEPHONE ENCOUNTER
Transfer call from clinic stating patient had allergy injections at the clinic and is now having an allergic reaction. Caller was not on line with transfer. Attempted to reach patient at  and call rolls to voice mail.  Placed 2nd call to patient. Patient stating she spoke to Dr Benitez's nurse who advised her what to do. Denies further questions.    Caller verbalized understanding. Denies further questions.    Agnes Malone, NARGIS  [facility]      Additional Information    Negative: [1] Caller is not with the adult (patient) AND [2] reporting urgent symptoms    Negative: Lab result questions    Negative: Medication questions    Negative: Caller can't be reached by phone    Negative: Caller has already spoken to PCP or another triager    Negative: RN needs further essential information from caller in order to complete triage    Negative: Requesting regular office appointment    Negative: [1] Caller requesting NON-URGENT health information AND [2] PCP's office is the best resource    Negative: Health Information question, no triage required and triager able to answer question    Negative: General information question, no triage required and triager able to answer question    Negative: Question about upcoming scheduled test, no triage required and triager able to answer question    Negative: [1] Caller is not with the adult (patient) AND [2] probable NON-URGENT symptoms    [1] Follow-up call to recent contact AND [2] information only call, no triage required    Protocols used: INFORMATION ONLY CALL-A-

## 2020-12-29 NOTE — TELEPHONE ENCOUNTER
Called patient and notified her that Dr. Espinoza would like to discontinue cluster immunotherapy due to her 2 reactions. Advised patient that she should still premedicate with Zyrtec the night before shots, the morning of shots and the evening after shots. Patient advised that the allergy shot staff will contact her to get her scheduled with them. Dosing updated in patient's allergy immunotherapy flowsheet.    Janiya BIANCHI MA

## 2020-12-30 NOTE — TELEPHONE ENCOUNTER
Called patient and scheduled her for her next two appointments in [place]. Patient is holding off from scheduling anymore because her insurance copay is too high. She is calling her insurance to see if the cpt code for an appointment with a nurse will be the same copay amount as an appointment with the physician. Gave patient allergy phone number so we can coordinate her schedule better.     Sary OROZCO RN, Allergy Clinic 12/30/20 4:09 PM

## 2021-01-06 ENCOUNTER — ALLIED HEALTH/NURSE VISIT (OUTPATIENT)
Dept: ALLERGY | Facility: OTHER | Age: 46
End: 2021-01-06
Payer: COMMERCIAL

## 2021-01-06 DIAGNOSIS — Z51.6 NEED FOR DESENSITIZATION TO ALLERGENS: Primary | ICD-10-CM

## 2021-01-06 PROCEDURE — 99207 PR DROP WITH A PROCEDURE: CPT

## 2021-01-06 PROCEDURE — 95115 IMMUNOTHERAPY ONE INJECTION: CPT

## 2021-01-06 NOTE — PROGRESS NOTES
Patient presented after waiting 30 minutes with no reaction to allergy injections. Discharged from clinic.    Leisa Jamil RN, RN ............   1/6/2021...12:48 PM

## 2021-01-14 ENCOUNTER — HEALTH MAINTENANCE LETTER (OUTPATIENT)
Age: 46
End: 2021-01-14

## 2021-01-20 ENCOUNTER — ALLIED HEALTH/NURSE VISIT (OUTPATIENT)
Dept: ALLERGY | Facility: OTHER | Age: 46
End: 2021-01-20
Payer: COMMERCIAL

## 2021-01-20 DIAGNOSIS — J30.9 ALLERGIC RHINITIS: ICD-10-CM

## 2021-01-20 DIAGNOSIS — Z51.6 NEED FOR DESENSITIZATION TO ALLERGENS: Primary | ICD-10-CM

## 2021-01-20 PROCEDURE — 99207 PR DROP WITH A PROCEDURE: CPT

## 2021-01-20 PROCEDURE — 95115 IMMUNOTHERAPY ONE INJECTION: CPT

## 2021-01-20 NOTE — PROGRESS NOTES
Patient presented after waiting 30 minutes with normal reaction to allergy injections. Discharged from clinic.      Vanessa Mendoza RN Specialty Triage 1/20/2021 11:36 AM

## 2021-01-27 ENCOUNTER — ALLIED HEALTH/NURSE VISIT (OUTPATIENT)
Dept: ALLERGY | Facility: OTHER | Age: 46
End: 2021-01-27
Payer: COMMERCIAL

## 2021-01-27 DIAGNOSIS — Z51.6 NEED FOR DESENSITIZATION TO ALLERGENS: Primary | ICD-10-CM

## 2021-01-27 PROCEDURE — 99207 PR DROP WITH A PROCEDURE: CPT

## 2021-01-27 PROCEDURE — 95115 IMMUNOTHERAPY ONE INJECTION: CPT

## 2021-01-27 NOTE — PROGRESS NOTES
Patient presented after waiting 30 minutes with normal reaction to allergy injections. Discharged from clinic.    Leisa Jamil RN............   1/27/2021...9:05 AM

## 2021-02-02 ENCOUNTER — ALLIED HEALTH/NURSE VISIT (OUTPATIENT)
Dept: ALLERGY | Facility: OTHER | Age: 46
End: 2021-02-02
Payer: COMMERCIAL

## 2021-02-02 DIAGNOSIS — Z51.6 NEED FOR DESENSITIZATION TO ALLERGENS: Primary | ICD-10-CM

## 2021-02-02 DIAGNOSIS — J30.9 ALLERGIC RHINITIS: ICD-10-CM

## 2021-02-02 PROCEDURE — 95115 IMMUNOTHERAPY ONE INJECTION: CPT

## 2021-02-02 PROCEDURE — 99207 PR DROP WITH A PROCEDURE: CPT

## 2021-02-02 NOTE — PROGRESS NOTES
Patient presented after waiting 30 minutes with no reaction to allergy injections. Discharged from clinic.    Vanessa ESTRADA RN Specialty Triage 2/2/2021 11:30 AM

## 2021-02-09 ENCOUNTER — OFFICE VISIT (OUTPATIENT)
Dept: ALLERGY | Facility: OTHER | Age: 46
End: 2021-02-09
Payer: COMMERCIAL

## 2021-02-09 ENCOUNTER — ALLIED HEALTH/NURSE VISIT (OUTPATIENT)
Dept: ALLERGY | Facility: OTHER | Age: 46
End: 2021-02-09
Payer: COMMERCIAL

## 2021-02-09 DIAGNOSIS — Z51.6 NEED FOR DESENSITIZATION TO ALLERGENS: Primary | ICD-10-CM

## 2021-02-09 DIAGNOSIS — T45.0X5A ANAPHYLAXIS AFTER ALLERGEN IMMUNOTHERAPY: ICD-10-CM

## 2021-02-09 DIAGNOSIS — J30.89 ALLERGIC RHINITIS DUE TO DUST MITE: ICD-10-CM

## 2021-02-09 DIAGNOSIS — J30.1 SEASONAL ALLERGIC RHINITIS DUE TO POLLEN: Primary | ICD-10-CM

## 2021-02-09 DIAGNOSIS — T45.0X5A ANAPHYLAXIS DUE TO ALLERGEN IMMUNOTHERAPY: ICD-10-CM

## 2021-02-09 DIAGNOSIS — T88.6XXA ANAPHYLAXIS AFTER ALLERGEN IMMUNOTHERAPY: ICD-10-CM

## 2021-02-09 DIAGNOSIS — T88.6XXA ANAPHYLAXIS DUE TO ALLERGEN IMMUNOTHERAPY: ICD-10-CM

## 2021-02-09 PROCEDURE — 99207 PR DROP WITH A PROCEDURE: CPT

## 2021-02-09 PROCEDURE — 95115 IMMUNOTHERAPY ONE INJECTION: CPT

## 2021-02-09 PROCEDURE — 99214 OFFICE O/P EST MOD 30 MIN: CPT | Mod: 25 | Performed by: ALLERGY & IMMUNOLOGY

## 2021-02-09 PROCEDURE — 96372 THER/PROPH/DIAG INJ SC/IM: CPT | Performed by: ALLERGY & IMMUNOLOGY

## 2021-02-09 RX ORDER — CETIRIZINE HYDROCHLORIDE 10 MG/1
20 TABLET ORAL ONCE
Status: COMPLETED | OUTPATIENT
Start: 2021-02-09 | End: 2021-02-09

## 2021-02-09 RX ORDER — EPINEPHRINE 0.3 MG/.3ML
0.3 INJECTION SUBCUTANEOUS ONCE
Status: COMPLETED | OUTPATIENT
Start: 2021-02-09 | End: 2021-02-09

## 2021-02-09 RX ADMIN — EPINEPHRINE 0.3 MG: 0.3 INJECTION SUBCUTANEOUS at 12:20

## 2021-02-09 RX ADMIN — CETIRIZINE HYDROCHLORIDE 20 MG: 10 TABLET ORAL at 11:15

## 2021-02-09 NOTE — NURSING NOTE
SYSTEMIC REACTION/ ANAPHYLAXIS TREATMENT RECORD    Prior systemic reaction: Yes    History of asthma: No    Provider responding to medical emergency: Olga      TIME PATIENT RECEIVED FROM [facility]: 11:20      RECORD OF CURRENT REACTION. DID YOU:             Assess airway and breathing? Yes            Administer IM epinephrine? No            Activate EMS (call 911 or local rescue team)? No            Review Protocol Yes      SYMPTOMS             OTHER: tingly /itchy tongue and throat    VITAL SIGNS    Time BP Pulse O2    1118 118/76 82 98   1122 124/80 64 98   1127 135/84 73 99   1134 117/75 63 99   1148 108/72 67 99   1208 115/72 75 100   1221 115/75 65 98   1229 109/71 65 99   1235 109/69 65 100   1252 120/74 65 100   1309 117/66 69 100             MEDICATIONS ADMINISTERED (ONCE PATIENT WITH PROVIDER TEAM)    Time Medication Dose Route   1220 EpiPen 0.3mg IM     Per provider verbal order, the following medication was given:     MEDICATION:Epinephrine   ROUTE: IM  SITE: Thigh - Left  DOSE: 0.3mg  LOT #: [id number]  :  Mylan  EXPIRATION DATE:  11/30/21  NDC#: 80737-719-97       Time of discharge:    1330    Condition upon release:    Stable    Patient instructions:     See provider note for details.

## 2021-02-09 NOTE — PROGRESS NOTES
Pt presented to allergy injection room approximately 25 minutes after injection with c/o tingling tongue an denied any other s/s at that time, VO from MD to administer 20mg zyrtec PO x1. VS were obtained and pt added she may have a tickle in her throat. MD  Assessed pt and was determined to stay in clinic for monitoring. Injection staff passed Pt to MD and Team for monitoring.    Vanessa ESTRADA RN Specialty Triage 2/9/2021 11:39 AM

## 2021-02-09 NOTE — PROGRESS NOTES
Sun Andrade is a 45 year old White female with previous medical history significant for allergic rhinitis who presents today with allergic reaction to allergen immunotherapy.    The patient has a history of reacting to allergen immunotherapy.  Today she received a yellow 0.3 mL.  She developed tingling and burning of her tongue and throat.  She additionally had abdominal pain and some flushing on her face.  Initially treated with 20 mg of cetirizine.  No improvement after 45 minutes.  No worsening of symptoms.  She was injected with 0.3 mg of epinephrine.  Resolution of symptoms after EpiPen.  Observed for another hour and discharged to home.    Past Medical History:   Diagnosis Date     Anxiety 2008     Attention deficit disorder of adult 2008     Depression 2008     Enlargement - tonsil/adenoid 2008     Itching 2008    in response to multiple medications-- see allergy list     Sleep apnea     mild; felt related to adenoids; no treatment     Family History   Problem Relation Age of Onset     Depression Mother      Breast Cancer Maternal Grandmother      Depression Maternal Grandmother      Diabetes Maternal Grandfather      DAMI Paternal Grandfather         CHF     Diabetes Paternal Grandmother      Asthma Daughter      Past Surgical History:   Procedure Laterality Date     C  DELIVERY ONLY       LAPAROSCOPY,TUBAL CAUTERY       SURGICAL HISTORY OF -   09    discectomy L5-S1 for herniated disc       REVIEW OF SYSTEMS:  Nose: negative for snoring.negative for changes in smell. negative for drainage.   Eyes: negative for eye watering. negative for eye itching. negative for vision changes. negative for eye redness.  Throat: negative for hoarseness. negative for sore throat. negative for trouble swallowing.   Lungs: negative for shortness of breath.negative for wheezing. negative for sputum production.   Integumentary: negative for rash. negative for scaling.  negative for nail changes.       Current Outpatient Medications:      Ascorbic Acid (VITAMIN C) 100 MG CHEW, , Disp: , Rfl:      buPROPion (WELLBUTRIN SR) 150 MG 12 hr tablet, Take 150 mg by mouth, Disp: , Rfl:      calcium-vitamin D (OSCAL) 250-125 MG-UNIT TABS per tablet, Take 1 tablet by mouth 2 times daily, Disp: , Rfl:      cetirizine (ZYRTEC) 10 MG tablet, Take 10 mg by mouth daily, Disp: , Rfl:      EPINEPHrine (AUVI-Q) 0.3 MG/0.3ML injection 2-pack, Inject 0.3 mLs (0.3 mg) into the muscle as needed for anaphylaxis (Patient not taking: Reported on 12/29/2020), Disp: 2 each, Rfl: 1     Ferrous Sulfate (IRON) 325 (65 Fe) MG tablet, Take 325 mg by mouth, Disp: , Rfl:      fish oil-omega-3 fatty acids 1000 MG capsule, Take 1,000 mg by mouth, Disp: , Rfl:      montelukast (SINGULAIR) 10 MG tablet, 10mg daily beginning two days prior to rush immunotherapy and take the morning of rush immunotherapy., Disp: 3 tablet, Rfl: 0     Multiple Vitamin (MULTI-VITAMIN DAILY PO), Take 1 tablet by mouth, Disp: , Rfl:      naproxen sodium (ANAPROX) 220 MG tablet, Take 220 mg by mouth 2 times daily (with meals), Disp: , Rfl:      ORDER FOR ALLERGEN IMMUNOTHERAPY, Dust Mites DF. 10,000 AU/mL, HS  0.5 ml Dust Mites DP. 10,000 AU/mL, HS  0.5 ml  Syed Grass (Std) 100,000 BAU/mL, HS 0.4 ml Ragweed, Mix (Giant, Short) 1:20 w/v, HS 0.4 ml Diluent: HSA qs to 5ml, Disp: 5 mL, Rfl: prn     trimethoprim-polymyxin b (POLYTRIM) ophthalmic solution, Place 1 drop into both eyes 4 times daily., Disp: 1 Bottle, Rfl: 0  No current facility-administered medications for this visit.   Immunization History   Administered Date(s) Administered     HepB 05/31/2011     MMR 05/31/2011     Mantoux Tuberculin Skin Test 05/31/2011     TDAP Vaccine (Adacel) 01/19/2011     Allergies   Allergen Reactions     Ativan Itching     Cephalosporins      itching     Dilaudid [Hydromorphone Hcl]      Itching     Hyoscyamine      itching     Oxycodone      Itching      Penicillins      itching     Sulfa Drugs      Rash and itching     Ultram [Tramadol]      itching     Vicodin [Acetaminophen]      itching         EXAM:   Constitutional:  Appears well-developed and well-nourished. No distress.   HEENT:   Head: Normocephalic.   Mouth/Throat: No oropharyngeal exudate present. No angioedema of posterior oropharynx noted.   No cobblestoning of posterior oropharynx.   Nasal tissue pink and normal appearing.  No rhinorrhea noted.    Cardiovascular: Normal rate, regular rhythm and normal heart sounds. Exam reveals no gallop and no friction rub.   No murmur heard.  Respiratory: Effort normal and breath sounds normal. No respiratory distress. No wheezes. No rales.   Musculoskeletal: Normal range of motion.   Neuro: Oriented to person, place, and time.  Skin: Skin is warm and dry. No rash noted.   Psychiatric: Normal mood and affect.     Nursing note and vitals reviewed.    ASSESSMENT/PLAN:  Problem List Items Addressed This Visit        Respiratory    Allergic rhinitis due to dust mite     Nasal and ocular symptoms in the spring and fall. Symptoms when [place] to [place] regardless of time of the year.     Allergy testing positive for dust mites and grass. Equivocal for ragweed. Positive for ragweed on blood testing.     Tongue itching and burning with abdominal pain 20 minutes after receiving allergy shot today. Treated with cetirizine 20mg and no improvement. Then treated with EpiPen and improvement in symptoms.            - Flonase 2 sprays/nostril daily.   - Zyrtec as needed.   - Discussed risk and benefits of continuing allergy shots in depth. She wishes to continue allergy shots. If she has another reaction will stop allergy shots altogether. Drop three doses and continue building.         Seasonal allergic rhinitis due to pollen - Primary    Relevant Medications    EPINEPHrine (ANY BX GENERIC EQUIV) injection 0.3 mg (Completed)      Other Visit Diagnoses     Anaphylaxis due  to allergen immunotherapy              Chart documentation with Dragon Voice recognition Software. Although reviewed after completion, some words and grammatical errors may remain.    DO NETTE LindsayI  Medical Director for Allergy/Immunology at [place], [place]

## 2021-02-09 NOTE — ASSESSMENT & PLAN NOTE
Nasal and ocular symptoms in the spring and fall. Symptoms when travels to [place] regardless of time of the year.     Allergy testing positive for dust mites and grass. Equivocal for ragweed. Positive for ragweed on blood testing.     Tongue itching and burning with abdominal pain 20 minutes after receiving allergy shot today. Treated with cetirizine 20mg and no improvement. Then treated with EpiPen and improvement in symptoms.            - Flonase 2 sprays/nostril daily.   - Zyrtec as needed.   - Discussed risk and benefits of continuing allergy shots in depth. She wishes to continue allergy shots. If she has another reaction will stop allergy shots altogether. Drop three doses and continue building.

## 2021-02-09 NOTE — PATIENT INSTRUCTIONS
Allergy Staff Appt Hours Shot Hours Locations    Physician     Ariel Espinoza DO       Support Staff     NARGIS Michaels CMA  Tuesday:        [place] 7-5 Wednesday:        [place]: 7-5 Thursday:                    [place] 7-6     Friday:  [place]  7-2   [place]        Thursday: 8-5:20        Friday: 7-12     [place]        Tuesday: 7- 3:20 Wednesday: 7-4:20     [place] Monday: 7-2:20 Tuesday: 9-5:20         [facility]  [address] [place] [address]  Appt Line: [phone]  Allergy RN:  [phone]    [facility]  [address] [place] [address]  Appt Line: [phone]  Allergy RN:  [phone]       Important Scheduling Information  Aspirin Desensitization: Appt will last 2 clinic days. Please call the Allergy RN line for your clinic to schedule. Discontinue antihistamines 7 days prior to the appointment.     Food Challenges: Appt will last 3-4 hours. Please call the Allergy RN line for your clinic to schedule. Discontinue antihistamines 7 days prior to the appointment.     Penicillin Testing: Appt will last 2-3 hours. Please call the Allergy RN line for your clinic to schedule. Discontinue antihistamines 7 days prior to the appointment.     Skin Testing: Appt will about 40 minutes. Call the appointment line for your clinic to schedule. Discontinue antihistamines 7 days prior to the appointment.     Venom Testing: Appt will last 2-3 hours. Please call the Allergy RN line for your clinic to schedule. Discontinue antihistamines 7 days prior to the appointment.     Thank you for trusting us with your Allergy, Asthma, and Immunology care. Please feel free to contact us with any questions or concerns you may have.

## 2021-02-09 NOTE — LETTER
2021         RE: Sun Andrade  [address]  [facility] [address]        Dear Colleague,    Thank you for referring your patient, Sun Andrade, to the [facility]. Please see a copy of my visit note below.    Sun Andrade is a 45 year old White female with previous medical history significant for allergic rhinitis who presents today with allergic reaction to allergen immunotherapy.    The patient has a history of reacting to allergen immunotherapy.  Today she received a yellow 0.3 mL.  She developed tingling and burning of her tongue and throat.  She additionally had abdominal pain and some flushing on her face.  Initially treated with 20 mg of cetirizine.  No improvement after 45 minutes.  No worsening of symptoms.  She was injected with 0.3 mg of epinephrine.  Resolution of symptoms after EpiPen.  Observed for another hour and discharged to home.    Past Medical History:   Diagnosis Date     Anxiety 2008     Attention deficit disorder of adult 2008     Depression 2008     Enlargement - tonsil/adenoid 2008     Itching 2008    in response to multiple medications-- see allergy list     Sleep apnea     mild; felt related to adenoids; no treatment     Family History   Problem Relation Age of Onset     Depression Mother      Breast Cancer Maternal Grandmother      Depression Maternal Grandmother      Diabetes Maternal Grandfather      C.A.D. Paternal Grandfather         CHF     Diabetes Paternal Grandmother      Asthma Daughter      Past Surgical History:   Procedure Laterality Date     C  DELIVERY ONLY       LAPAROSCOPY,TUBAL CAUTERY       SURGICAL HISTORY OF -   09    discectomy L5-S1 for herniated disc       REVIEW OF SYSTEMS:  Nose: negative for snoring.negative for changes in smell. negative for drainage.   Eyes: negative for eye watering. negative for eye itching. negative for vision changes. negative for eye redness.  Throat:  negative for hoarseness. negative for sore throat. negative for trouble swallowing.   Lungs: negative for shortness of breath.negative for wheezing. negative for sputum production.   Integumentary: negative for rash. negative for scaling. negative for nail changes.       Current Outpatient Medications:      Ascorbic Acid (VITAMIN C) 100 MG CHEW, , Disp: , Rfl:      buPROPion (WELLBUTRIN SR) 150 MG 12 hr tablet, Take 150 mg by mouth, Disp: , Rfl:      calcium-vitamin D (OSCAL) 250-125 MG-UNIT TABS per tablet, Take 1 tablet by mouth 2 times daily, Disp: , Rfl:      cetirizine (ZYRTEC) 10 MG tablet, Take 10 mg by mouth daily, Disp: , Rfl:      EPINEPHrine (AUVI-Q) 0.3 MG/0.3ML injection 2-pack, Inject 0.3 mLs (0.3 mg) into the muscle as needed for anaphylaxis (Patient not taking: Reported on 12/29/2020), Disp: 2 each, Rfl: 1     Ferrous Sulfate (IRON) 325 (65 Fe) MG tablet, Take 325 mg by mouth, Disp: , Rfl:      fish oil-omega-3 fatty acids 1000 MG capsule, Take 1,000 mg by mouth, Disp: , Rfl:      montelukast (SINGULAIR) 10 MG tablet, 10mg daily beginning two days prior to rush immunotherapy and take the morning of rush immunotherapy., Disp: 3 tablet, Rfl: 0     Multiple Vitamin (MULTI-VITAMIN DAILY PO), Take 1 tablet by mouth, Disp: , Rfl:      naproxen sodium (ANAPROX) 220 MG tablet, Take 220 mg by mouth 2 times daily (with meals), Disp: , Rfl:      ORDER FOR ALLERGEN IMMUNOTHERAPY, Dust Mites DF. 10,000 AU/mL, HS  0.5 ml Dust Mites DP. 10,000 AU/mL, HS  0.5 ml  Syed Grass (Std) 100,000 BAU/mL, HS 0.4 ml Ragweed, Mix (Giant, Short) 1:20 w/v, HS 0.4 ml Diluent: HSA qs to 5ml, Disp: 5 mL, Rfl: prn     trimethoprim-polymyxin b (POLYTRIM) ophthalmic solution, Place 1 drop into both eyes 4 times daily., Disp: 1 Bottle, Rfl: 0  No current facility-administered medications for this visit.   Immunization History   Administered Date(s) Administered     HepB 05/31/2011     MMR 05/31/2011     Mantoux Tuberculin Skin Test  05/31/2011     TDAP Vaccine (Adacel) 01/19/2011     Allergies   Allergen Reactions     Ativan Itching     Cephalosporins      itching     Dilaudid [Hydromorphone Hcl]      Itching     Hyoscyamine      itching     Oxycodone      Itching     Penicillins      itching     Sulfa Drugs      Rash and itching     Ultram [Tramadol]      itching     Vicodin [Acetaminophen]      itching         EXAM:   Constitutional:  Appears well-developed and well-nourished. No distress.   HEENT:   Head: Normocephalic.   Mouth/Throat: No oropharyngeal exudate present. No angioedema of posterior oropharynx noted.   No cobblestoning of posterior oropharynx.   Nasal tissue pink and normal appearing.  No rhinorrhea noted.    Cardiovascular: Normal rate, regular rhythm and normal heart sounds. Exam reveals no gallop and no friction rub.   No murmur heard.  Respiratory: Effort normal and breath sounds normal. No respiratory distress. No wheezes. No rales.   Musculoskeletal: Normal range of motion.   Neuro: Oriented to person, place, and time.  Skin: Skin is warm and dry. No rash noted.   Psychiatric: Normal mood and affect.     Nursing note and vitals reviewed.    ASSESSMENT/PLAN:  Problem List Items Addressed This Visit        Respiratory    Allergic rhinitis due to dust mite     Nasal and ocular symptoms in the spring and fall. Symptoms when [place] to [place] regardless of time of the year.     Allergy testing positive for dust mites and grass. Equivocal for ragweed. Positive for ragweed on blood testing.     Tongue itching and burning with abdominal pain 20 minutes after receiving allergy shot today. Treated with cetirizine 20mg and no improvement. Then treated with EpiPen and improvement in symptoms.            - Flonase 2 sprays/nostril daily.   - Zyrtec as needed.   - Discussed risk and benefits of continuing allergy shots in depth. She wishes to continue allergy shots. If she has another reaction will stop allergy shots  altogether. Drop three doses and continue building.         Seasonal allergic rhinitis due to pollen - Primary    Relevant Medications    EPINEPHrine (ANY BX GENERIC EQUIV) injection 0.3 mg (Completed)      Other Visit Diagnoses     Anaphylaxis due to allergen immunotherapy              Chart documentation with Dragon Voice recognition Software. Although reviewed after completion, some words and grammatical errors may remain.    Ariel Espinoza DO FAAAAI  Medical Director for Allergy/Immunology at [place], [place]        Again, thank you for allowing me to participate in the care of your patient.        Sincerely,        Ariel Espinoza DO

## 2021-02-16 ENCOUNTER — ALLIED HEALTH/NURSE VISIT (OUTPATIENT)
Dept: ALLERGY | Facility: OTHER | Age: 46
End: 2021-02-16
Payer: COMMERCIAL

## 2021-02-16 DIAGNOSIS — J30.9 ALLERGIC RHINITIS: ICD-10-CM

## 2021-02-16 DIAGNOSIS — J30.1 SEASONAL ALLERGIC RHINITIS DUE TO POLLEN: Primary | ICD-10-CM

## 2021-02-16 DIAGNOSIS — J30.89 ALLERGIC RHINITIS DUE TO DUST MITE: ICD-10-CM

## 2021-02-16 PROCEDURE — 95115 IMMUNOTHERAPY ONE INJECTION: CPT

## 2021-02-16 PROCEDURE — 99207 PR DROP WITH A PROCEDURE: CPT

## 2021-02-16 NOTE — PROGRESS NOTES
Patient presented after waiting 30 minutes with no reaction to allergy injections. Discharged from clinic.    Vanessa ESTRADA RN Specialty Triage 2/16/2021 11:45 AM

## 2021-03-02 ENCOUNTER — ALLIED HEALTH/NURSE VISIT (OUTPATIENT)
Dept: ALLERGY | Facility: OTHER | Age: 46
End: 2021-03-02
Payer: COMMERCIAL

## 2021-03-02 DIAGNOSIS — Z51.6 NEED FOR DESENSITIZATION TO ALLERGENS: Primary | ICD-10-CM

## 2021-03-02 PROCEDURE — 95115 IMMUNOTHERAPY ONE INJECTION: CPT

## 2021-03-02 PROCEDURE — 99207 PR DROP WITH A PROCEDURE: CPT

## 2021-03-02 NOTE — PROGRESS NOTES
Patient presented after waiting 30 minutes with no reaction to allergy injections. Discharged from clinic.    Jackie Louis RN

## 2021-03-09 ENCOUNTER — ALLIED HEALTH/NURSE VISIT (OUTPATIENT)
Dept: ALLERGY | Facility: OTHER | Age: 46
End: 2021-03-09
Payer: COMMERCIAL

## 2021-03-09 DIAGNOSIS — J30.89 ALLERGIC RHINITIS DUE TO DUST MITE: ICD-10-CM

## 2021-03-09 DIAGNOSIS — J30.9 ALLERGIC RHINITIS: ICD-10-CM

## 2021-03-09 DIAGNOSIS — J30.1 SEASONAL ALLERGIC RHINITIS DUE TO POLLEN: Primary | ICD-10-CM

## 2021-03-09 PROCEDURE — 95115 IMMUNOTHERAPY ONE INJECTION: CPT

## 2021-03-09 PROCEDURE — 99207 PR DROP WITH A PROCEDURE: CPT

## 2021-03-09 NOTE — PROGRESS NOTES
Patient presented after waiting 30 minutes with no reaction to allergy injections. Discharged from clinic.    Vanessa ESTRADA RN Specialty Triage 3/9/2021 11:18 AM

## 2021-03-14 ENCOUNTER — HEALTH MAINTENANCE LETTER (OUTPATIENT)
Age: 46
End: 2021-03-14

## 2021-03-16 ENCOUNTER — ALLIED HEALTH/NURSE VISIT (OUTPATIENT)
Dept: ALLERGY | Facility: OTHER | Age: 46
End: 2021-03-16
Payer: COMMERCIAL

## 2021-03-16 DIAGNOSIS — J30.1 SEASONAL ALLERGIC RHINITIS DUE TO POLLEN: Primary | ICD-10-CM

## 2021-03-16 DIAGNOSIS — Z51.6 NEED FOR DESENSITIZATION TO ALLERGENS: ICD-10-CM

## 2021-03-16 PROCEDURE — 99207 PR DROP WITH A PROCEDURE: CPT

## 2021-03-16 PROCEDURE — 95115 IMMUNOTHERAPY ONE INJECTION: CPT

## 2021-03-16 NOTE — PROGRESS NOTES
Patient presented after waiting 30 minutes with no reaction to allergy injections. Discharged from clinic.    Leisa Jamil RN............   3/16/2021...9:04 AM

## 2021-03-23 ENCOUNTER — ALLIED HEALTH/NURSE VISIT (OUTPATIENT)
Dept: ALLERGY | Facility: OTHER | Age: 46
End: 2021-03-23
Payer: COMMERCIAL

## 2021-03-23 DIAGNOSIS — J30.1 SEASONAL ALLERGIC RHINITIS DUE TO POLLEN: Primary | ICD-10-CM

## 2021-03-23 DIAGNOSIS — J30.89 ALLERGIC RHINITIS DUE TO DUST MITE: ICD-10-CM

## 2021-03-23 DIAGNOSIS — J30.9 ALLERGIC RHINITIS: ICD-10-CM

## 2021-03-23 PROCEDURE — 99207 PR DROP WITH A PROCEDURE: CPT

## 2021-03-23 PROCEDURE — 95115 IMMUNOTHERAPY ONE INJECTION: CPT

## 2021-03-23 NOTE — PROGRESS NOTES
Patient presented after waiting 30 minutes with no reaction to allergy injections. Discharged from clinic.    Vanessa ESTRADA RN Specialty Triage 3/23/2021 11:38 AM

## 2021-04-06 ENCOUNTER — ALLIED HEALTH/NURSE VISIT (OUTPATIENT)
Dept: ALLERGY | Facility: OTHER | Age: 46
End: 2021-04-06
Payer: COMMERCIAL

## 2021-04-06 DIAGNOSIS — J30.9 ALLERGIC RHINITIS: ICD-10-CM

## 2021-04-06 DIAGNOSIS — J30.89 ALLERGIC RHINITIS DUE TO DUST MITE: ICD-10-CM

## 2021-04-06 DIAGNOSIS — J30.1 SEASONAL ALLERGIC RHINITIS DUE TO POLLEN: Primary | ICD-10-CM

## 2021-04-06 PROCEDURE — 99207 PR DROP WITH A PROCEDURE: CPT

## 2021-04-06 PROCEDURE — 95115 IMMUNOTHERAPY ONE INJECTION: CPT

## 2021-04-06 NOTE — PROGRESS NOTES
Patient presented after waiting 30 minutes with no reaction to allergy injections. Discharged from clinic.    Vanessa ESTRADA RN Specialty Triage 4/6/2021 10:47 AM

## 2021-04-13 ENCOUNTER — ALLIED HEALTH/NURSE VISIT (OUTPATIENT)
Dept: ALLERGY | Facility: OTHER | Age: 46
End: 2021-04-13
Payer: COMMERCIAL

## 2021-04-13 DIAGNOSIS — J30.9 ALLERGIC RHINITIS: ICD-10-CM

## 2021-04-13 DIAGNOSIS — J30.1 SEASONAL ALLERGIC RHINITIS DUE TO POLLEN: Primary | ICD-10-CM

## 2021-04-13 DIAGNOSIS — J30.89 ALLERGIC RHINITIS DUE TO DUST MITE: ICD-10-CM

## 2021-04-13 PROCEDURE — 99207 PR DROP WITH A PROCEDURE: CPT

## 2021-04-13 PROCEDURE — 95115 IMMUNOTHERAPY ONE INJECTION: CPT

## 2021-04-13 NOTE — PROGRESS NOTES
Patient presented after waiting 30 minutes with no reaction to allergy injections. Discharged from clinic.    Vanessa ESTRADA RN Specialty Triage 4/13/2021 11:54 AM

## 2021-04-20 ENCOUNTER — ALLIED HEALTH/NURSE VISIT (OUTPATIENT)
Dept: ALLERGY | Facility: OTHER | Age: 46
End: 2021-04-20
Payer: COMMERCIAL

## 2021-04-20 DIAGNOSIS — Z51.6 NEED FOR DESENSITIZATION TO ALLERGENS: Primary | ICD-10-CM

## 2021-04-20 PROCEDURE — 99207 PR DROP WITH A PROCEDURE: CPT

## 2021-04-20 PROCEDURE — 95115 IMMUNOTHERAPY ONE INJECTION: CPT

## 2021-04-20 NOTE — PROGRESS NOTES
Patient presented after waiting 30 minutes with no reaction to allergy injections. Discharged from clinic.    Onesimo GRAY RN....4/20/2021 11:28 AM

## 2021-04-27 ENCOUNTER — ALLIED HEALTH/NURSE VISIT (OUTPATIENT)
Dept: ALLERGY | Facility: OTHER | Age: 46
End: 2021-04-27
Payer: COMMERCIAL

## 2021-04-27 DIAGNOSIS — J30.9 ALLERGIC RHINITIS: ICD-10-CM

## 2021-04-27 DIAGNOSIS — J30.89 ALLERGIC RHINITIS DUE TO DUST MITE: ICD-10-CM

## 2021-04-27 DIAGNOSIS — J30.1 SEASONAL ALLERGIC RHINITIS DUE TO POLLEN: Primary | ICD-10-CM

## 2021-04-27 PROCEDURE — 99207 PR DROP WITH A PROCEDURE: CPT

## 2021-04-27 PROCEDURE — 95115 IMMUNOTHERAPY ONE INJECTION: CPT

## 2021-05-11 ENCOUNTER — ALLIED HEALTH/NURSE VISIT (OUTPATIENT)
Dept: ALLERGY | Facility: OTHER | Age: 46
End: 2021-05-11
Payer: COMMERCIAL

## 2021-05-11 DIAGNOSIS — J30.9 ALLERGIC RHINITIS: Primary | ICD-10-CM

## 2021-05-11 PROCEDURE — 95115 IMMUNOTHERAPY ONE INJECTION: CPT

## 2021-05-11 PROCEDURE — 99207 PR DROP WITH A PROCEDURE: CPT

## 2021-05-11 NOTE — PROGRESS NOTES
Patient presented after waiting 30 minutes with normal reaction to  injections. Discharged from clinic.    Sary OROZCO RN, Allergy Clinic 05/11/21 11:35 AM

## 2021-05-18 ENCOUNTER — ALLIED HEALTH/NURSE VISIT (OUTPATIENT)
Dept: ALLERGY | Facility: OTHER | Age: 46
End: 2021-05-18
Payer: COMMERCIAL

## 2021-05-18 ENCOUNTER — OFFICE VISIT (OUTPATIENT)
Dept: ALLERGY | Facility: OTHER | Age: 46
End: 2021-05-18
Payer: COMMERCIAL

## 2021-05-18 DIAGNOSIS — J30.89 ALLERGIC RHINITIS DUE TO DUST MITE: ICD-10-CM

## 2021-05-18 DIAGNOSIS — J30.9 ALLERGIC RHINITIS: ICD-10-CM

## 2021-05-18 DIAGNOSIS — J30.1 SEASONAL ALLERGIC RHINITIS DUE TO POLLEN: ICD-10-CM

## 2021-05-18 DIAGNOSIS — T88.6XXA ANAPHYLAXIS DUE TO ALLERGEN IMMUNOTHERAPY: Primary | ICD-10-CM

## 2021-05-18 DIAGNOSIS — T45.0X5A ANAPHYLAXIS DUE TO ALLERGEN IMMUNOTHERAPY: Primary | ICD-10-CM

## 2021-05-18 DIAGNOSIS — J30.1 SEASONAL ALLERGIC RHINITIS DUE TO POLLEN: Primary | ICD-10-CM

## 2021-05-18 PROCEDURE — 95115 IMMUNOTHERAPY ONE INJECTION: CPT

## 2021-05-18 PROCEDURE — 99207 PR DROP WITH A PROCEDURE: CPT

## 2021-05-18 PROCEDURE — 99215 OFFICE O/P EST HI 40 MIN: CPT | Mod: 25 | Performed by: ALLERGY & IMMUNOLOGY

## 2021-05-18 PROCEDURE — 96372 THER/PROPH/DIAG INJ SC/IM: CPT | Mod: 59

## 2021-05-18 RX ORDER — CETIRIZINE HYDROCHLORIDE 10 MG/1
20 TABLET ORAL ONCE
Status: COMPLETED | OUTPATIENT
Start: 2021-05-18 | End: 2021-05-18

## 2021-05-18 RX ORDER — PREDNISONE 10 MG/1
60 TABLET ORAL ONCE
Status: COMPLETED | OUTPATIENT
Start: 2021-05-18 | End: 2021-05-18

## 2021-05-18 RX ORDER — EPINEPHRINE 0.3 MG/.3ML
0.3 INJECTION SUBCUTANEOUS ONCE
Status: COMPLETED | OUTPATIENT
Start: 2021-05-18 | End: 2021-05-18

## 2021-05-18 RX ADMIN — EPINEPHRINE 0.3 MG: 0.3 INJECTION SUBCUTANEOUS at 11:29

## 2021-05-18 RX ADMIN — PREDNISONE 60 MG: 10 TABLET ORAL at 12:00

## 2021-05-18 RX ADMIN — CETIRIZINE HYDROCHLORIDE 20 MG: 10 TABLET ORAL at 11:37

## 2021-05-18 NOTE — LETTER
2021         RE: Sun Andrade  [address]  [facility] [address]        Dear Colleague,    Thank you for referring your patient, Sun Andrade, to the [facility]. Please see a copy of my visit note below.    Sun Andrade is a 46 year old White female with previous medical history significant for allergic rhinitis who returns for a follow up visit.     Patient received her allergy shot today.  Approximately 29 minutes after receiving she began to feel flushed on her neck, chest and face.  She additionally felt heavy.  She was treated with epinephrine.  Shortly after receiving epi she became dizzy, lightheaded and fell like she was going to pass out.  Vital signs were stable.  Been treated with cetirizine 20 mg and prednisone 60 mg.  Dizziness and lightheadedness improved but 15-20 minutes later she developed fogginess. Vitals remained stable. Mild symptoms. Had ocular itching and redness and mild scalp itching. Also had intermittent itching of throat.     Past Medical History:   Diagnosis Date     Anxiety 2008     Attention deficit disorder of adult 2008     Depression 2008     Enlargement - tonsil/adenoid 2008     Itching 2008    in response to multiple medications-- see allergy list     Sleep apnea     mild; felt related to adenoids; no treatment     Family History   Problem Relation Age of Onset     Depression Mother      Breast Cancer Maternal Grandmother      Depression Maternal Grandmother      Diabetes Maternal Grandfather      C.A.D. Paternal Grandfather         CHF     Diabetes Paternal Grandmother      Asthma Daughter      Past Surgical History:   Procedure Laterality Date     C  DELIVERY ONLY       LAPAROSCOPY,TUBAL CAUTERY       SURGICAL HISTORY OF -   09    discectomy L5-S1 for herniated disc       REVIEW OF SYSTEMS:  Nose: negative for snoring.negative for changes in smell. negative for drainage.   Eyes: negative  for eye watering. negative for eye itching. negative for vision changes. negative for eye redness.  Throat: negative for hoarseness. negative for sore throat. negative for trouble swallowing.   Lungs: negative for shortness of breath.negative for wheezing. negative for sputum production.   Integumentary: negative for rash. negative for scaling. negative for nail changes.       Current Outpatient Medications:      Ascorbic Acid (VITAMIN C) 100 MG CHEW, , Disp: , Rfl:      buPROPion (WELLBUTRIN SR) 150 MG 12 hr tablet, Take 150 mg by mouth, Disp: , Rfl:      calcium-vitamin D (OSCAL) 250-125 MG-UNIT TABS per tablet, Take 1 tablet by mouth 2 times daily, Disp: , Rfl:      cetirizine (ZYRTEC) 10 MG tablet, Take 10 mg by mouth daily, Disp: , Rfl:      EPINEPHrine (AUVI-Q) 0.3 MG/0.3ML injection 2-pack, Inject 0.3 mLs (0.3 mg) into the muscle as needed for anaphylaxis (Patient not taking: Reported on 12/29/2020), Disp: 2 each, Rfl: 1     Ferrous Sulfate (IRON) 325 (65 Fe) MG tablet, Take 325 mg by mouth, Disp: , Rfl:      fish oil-omega-3 fatty acids 1000 MG capsule, Take 1,000 mg by mouth, Disp: , Rfl:      montelukast (SINGULAIR) 10 MG tablet, 10mg daily beginning two days prior to rush immunotherapy and take the morning of rush immunotherapy., Disp: 3 tablet, Rfl: 0     Multiple Vitamin (MULTI-VITAMIN DAILY PO), Take 1 tablet by mouth, Disp: , Rfl:      naproxen sodium (ANAPROX) 220 MG tablet, Take 220 mg by mouth 2 times daily (with meals), Disp: , Rfl:      ORDER FOR ALLERGEN IMMUNOTHERAPY, Dust Mites DF. 10,000 AU/mL, HS  0.5 ml Dust Mites DP. 10,000 AU/mL, HS  0.5 ml  Syed Grass (Std) 100,000 BAU/mL, HS 0.4 ml Ragweed, Mix (Giant, Short) 1:20 w/v, HS 0.4 ml Diluent: HSA qs to 5ml, Disp: 5 mL, Rfl: prn     trimethoprim-polymyxin b (POLYTRIM) ophthalmic solution, Place 1 drop into both eyes 4 times daily., Disp: 1 Bottle, Rfl: 0  No current facility-administered medications for this visit.   Immunization History    Administered Date(s) Administered     HepB 05/31/2011     MMR 05/31/2011     Mantoux Tuberculin Skin Test 05/31/2011     TDAP Vaccine (Adacel) 01/19/2011     Allergies   Allergen Reactions     Ativan Itching     Cephalosporins      itching     Dilaudid [Hydromorphone Hcl]      Itching     Hyoscyamine      itching     Oxycodone      Itching     Penicillins      itching     Sulfa Drugs      Rash and itching     Ultram [Tramadol]      itching     Vicodin [Acetaminophen]      itching         EXAM:   Constitutional:  Appears well-developed and well-nourished. No distress.   HEENT:   Head: Normocephalic.   Mouth/Throat: No oropharyngeal exudate present.   No cobblestoning of posterior oropharynx.   Nasal tissue pink and normal appearing.  No rhinorrhea noted.    Eyes: Conjunctivae are non-erythematous   Cardiovascular: Normal rate, regular rhythm and normal heart sounds. Exam reveals no gallop and no friction rub.   No murmur heard.  Respiratory: Effort normal and breath sounds normal. No respiratory distress. No wheezes. No rales.   Musculoskeletal: Normal range of motion.   Neuro: Oriented to person, place, and time.  Skin: Skin is warm and dry. No rash noted.   Psychiatric: Normal mood and affect.     Nursing note and vitals reviewed.    ASSESSMENT/PLAN:  Problem List Items Addressed This Visit        Respiratory    Allergic rhinitis due to dust mite     Nasal and ocular symptoms in the spring and fall. Symptoms when [place] regardless of time of the year.     Allergy testing positive for dust mites and grass. Equivocal for ragweed. Positive for ragweed on blood testing.      2/2021:Tongue itching and burning with abdominal pain 20 minutes after receiving allergy shot today. Treated with cetirizine 20mg and no improvement. Then treated with EpiPen and improvement in symptoms.     5/2021: Flushing, heaviness in face. Treated with EpiPen. Felt light headed and going to pass out. Treated with  cetirizine 20mg and Prednisone 60mg. She otherwise developed itching of eyes, watering of eyes and scalp itching. All symptoms resolved. Observed for over 2 hours post epipen administration. Stable for discharge to home. Discussed when to return to ER and use EpiPen with patient.            - Flonase 2 sprays/nostril daily.   - Zyrtec as needed.   - Discontinue allergy shots.         Relevant Medications    cetirizine (zyrTEC) tablet 20 mg (Completed)    Seasonal allergic rhinitis due to pollen    Relevant Medications    cetirizine (zyrTEC) tablet 20 mg (Completed)      Other Visit Diagnoses     Anaphylaxis due to allergen immunotherapy    -  Primary    Relevant Medications    cetirizine (zyrTEC) tablet 20 mg (Completed)    predniSONE (DELTASONE) tablet 60 mg (Completed)          Chart documentation with Dragon Voice recognition Software. Although reviewed after completion, some words and grammatical errors may remain.    Ariel Espinoza DO FAAAAI  Medical Director for Allergy/Immunology at [facility] and [place], [place]        SYSTEMIC REACTION/ ANAPHYLAXIS TREATMENT RECORD    Prior systemic reaction: Yes    History of asthma: No    Provider responding to medical emergency: A Olga      TIME PATIENT RECEIVED FROM SHOT CLINIC: 1128      RECORD OF CURRENT REACTION. DID YOU:             Assess airway and breathing? Yes            Administer IM epinephrine? Yes            Activate EMS (call 911 or local rescue team)? No                 SYMPTOMS             RESPIRATORY: Shortness of Breath            SKIN: Flushing, generalized itching            EYE/ NASAL: Eye Redness            VASCULAR: Dizziness and Headache            OTHER: Diaphoresis, head feels heavy, loss of consciousness after epi pen with convulsions      VITAL SIGNS    Time BP Pulse O2     1128 107/73 96 97 Epi pen   1134 128/78 66 99    1140 107/73 70 98    1147 109/74 77 100            1259       120/77           67            98          1210      113/78          68           98          1225             120/73                 73                     98          1239              122/77                76                     100          1250             110/75                 76                      100          1320              129/81                76                       98           1357             112/74                78                        97  MEDICATIONS ADMINISTERED (ONCE PATIENT WITH PROVIDER TEAM)    Time Medication Dose Route   1129 epi 0.3mg Sub q   1137 zyrtec 20mg oral   1159 prednisone 60mg oral           Per provider verbal order, the following medication was given:     MEDICATION:Epinephrine   ROUTE: SQ  SITE: Thigh - Left  DOSE: 0.3mg   LOT #: [id number]  :  [employer]  EXPIRATION DATE:  12/31/21  NDC#: [id number]     Per provider verbal order, the following medication was given:     MEDICATION:Cetirizine  ROUTE: PO  SITE: mouth  DOSE: 20mg  LOT #: [id number]  :  [employer]  EXPIRATION DATE:  9/21  NDC#: [id number]       Per provider verbal order, the following medication was given:     MEDICATION:prednisone  ROUTE: PO  SITE: mouth  DOSE: 60mg  LOT #: [id number]  :  [employer]  EXPIRATION DATE:  06/21  NDC#: [id number]     Time of discharge:   1413    Condition upon release:    stable    Patient instructions:   Follow up with MD in one month    Vanessa ESTRADA RN Specialty Triage 5/18/2021 2:14 PM              Again, thank you for allowing me to participate in the care of your patient.        Sincerely,        Ariel Espinoza, DO

## 2021-05-18 NOTE — PROGRESS NOTES
Patient presented after waiting 25 minutes and had an anaphylactic reaction. Transferred over to Dr. Tad silva for anaphylactic management.     Sary OROZCO RN, Allergy Clinic 05/18/21 2:53 PM

## 2021-05-18 NOTE — PROGRESS NOTES
Clinic Administered Medication Documentation      Injectable Medication Documentation    Patient was given Epinephrine . Prior to medication administration, verified patients identity using patient s name and date of birth. Please see MAR and medication order for additional information. Patient instructed to stay in clinic and be evaluatued by physician. .      Was entire vial of medication used? Yes  Vial/Syringe: Syringe  Expiration Date:  12/31/21  Was this medication supplied by the patient? No     Sary OROZCO RN, Allergy Clinic 05/18/21 11:40 AM

## 2021-05-18 NOTE — PROGRESS NOTES
Sun Andrade is a 46 year old White female with previous medical history significant for allergic rhinitis who returns for a follow up visit.     Patient received her allergy shot today.  Approximately 29 minutes after receiving she began to feel flushed on her neck, chest and face.  She additionally felt heavy.  She was treated with epinephrine.  Shortly after receiving epi she became dizzy, lightheaded and fell like she was going to pass out.  Vital signs were stable.  Been treated with cetirizine 20 mg and prednisone 60 mg.  Dizziness and lightheadedness improved but 15-20 minutes later she developed fogginess. Vitals remained stable. Mild symptoms. Had ocular itching and redness and mild scalp itching. Also had intermittent itching of throat.     Past Medical History:   Diagnosis Date     Anxiety 2008     Attention deficit disorder of adult 2008     Depression 2008     Enlargement - tonsil/adenoid 2008     Itching 2008    in response to multiple medications-- see allergy list     Sleep apnea     mild; felt related to adenoids; no treatment     Family History   Problem Relation Age of Onset     Depression Mother      Breast Cancer Maternal Grandmother      Depression Maternal Grandmother      Diabetes Maternal Grandfather      C.A.D. Paternal Grandfather         CHF     Diabetes Paternal Grandmother      Asthma Daughter      Past Surgical History:   Procedure Laterality Date     C  DELIVERY ONLY       LAPAROSCOPY,TUBAL CAUTERY       SURGICAL HISTORY OF -   09    discectomy L5-S1 for herniated disc       REVIEW OF SYSTEMS:  Nose: negative for snoring.negative for changes in smell. negative for drainage.   Eyes: negative for eye watering. negative for eye itching. negative for vision changes. negative for eye redness.  Throat: negative for hoarseness. negative for sore throat. negative for trouble swallowing.   Lungs: negative for shortness of breath.negative for  wheezing. negative for sputum production.   Integumentary: negative for rash. negative for scaling. negative for nail changes.       Current Outpatient Medications:      Ascorbic Acid (VITAMIN C) 100 MG CHEW, , Disp: , Rfl:      buPROPion (WELLBUTRIN SR) 150 MG 12 hr tablet, Take 150 mg by mouth, Disp: , Rfl:      calcium-vitamin D (OSCAL) 250-125 MG-UNIT TABS per tablet, Take 1 tablet by mouth 2 times daily, Disp: , Rfl:      cetirizine (ZYRTEC) 10 MG tablet, Take 10 mg by mouth daily, Disp: , Rfl:      EPINEPHrine (AUVI-Q) 0.3 MG/0.3ML injection 2-pack, Inject 0.3 mLs (0.3 mg) into the muscle as needed for anaphylaxis (Patient not taking: Reported on 12/29/2020), Disp: 2 each, Rfl: 1     Ferrous Sulfate (IRON) 325 (65 Fe) MG tablet, Take 325 mg by mouth, Disp: , Rfl:      fish oil-omega-3 fatty acids 1000 MG capsule, Take 1,000 mg by mouth, Disp: , Rfl:      montelukast (SINGULAIR) 10 MG tablet, 10mg daily beginning two days prior to rush immunotherapy and take the morning of rush immunotherapy., Disp: 3 tablet, Rfl: 0     Multiple Vitamin (MULTI-VITAMIN DAILY PO), Take 1 tablet by mouth, Disp: , Rfl:      naproxen sodium (ANAPROX) 220 MG tablet, Take 220 mg by mouth 2 times daily (with meals), Disp: , Rfl:      ORDER FOR ALLERGEN IMMUNOTHERAPY, Dust Mites DF. 10,000 AU/mL, HS  0.5 ml Dust Mites DP. 10,000 AU/mL, HS  0.5 ml  Syed Grass (Std) 100,000 BAU/mL, HS 0.4 ml Ragweed, Mix (Giant, Short) 1:20 w/v, HS 0.4 ml Diluent: HSA qs to 5ml, Disp: 5 mL, Rfl: prn     trimethoprim-polymyxin b (POLYTRIM) ophthalmic solution, Place 1 drop into both eyes 4 times daily., Disp: 1 Bottle, Rfl: 0  No current facility-administered medications for this visit.   Immunization History   Administered Date(s) Administered     HepB 05/31/2011     MMR 05/31/2011     Mantoux Tuberculin Skin Test 05/31/2011     TDAP Vaccine (Adacel) 01/19/2011     Allergies   Allergen Reactions     Ativan Itching     Cephalosporins      itching      Dilaudid [Hydromorphone Hcl]      Itching     Hyoscyamine      itching     Oxycodone      Itching     Penicillins      itching     Sulfa Drugs      Rash and itching     Ultram [Tramadol]      itching     Vicodin [Acetaminophen]      itching         EXAM:   Constitutional:  Appears well-developed and well-nourished. No distress.   HEENT:   Head: Normocephalic.   Mouth/Throat: No oropharyngeal exudate present.   No cobblestoning of posterior oropharynx.   Nasal tissue pink and normal appearing.  No rhinorrhea noted.    Eyes: Conjunctivae are non-erythematous   Cardiovascular: Normal rate, regular rhythm and normal heart sounds. Exam reveals no gallop and no friction rub.   No murmur heard.  Respiratory: Effort normal and breath sounds normal. No respiratory distress. No wheezes. No rales.   Musculoskeletal: Normal range of motion.   Neuro: Oriented to person, place, and time.  Skin: Skin is warm and dry. No rash noted.   Psychiatric: Normal mood and affect.     Nursing note and vitals reviewed.    ASSESSMENT/PLAN:  Problem List Items Addressed This Visit        Respiratory    Allergic rhinitis due to dust mite     Nasal and ocular symptoms in the spring and fall. Symptoms when travels to [place] clim[place] regardless of time of the year.     Allergy testing positive for dust mites and grass. Equivocal for ragweed. Positive for ragweed on blood testing.      2/2021:Tongue itching and burning with abdominal pain 20 minutes after receiving allergy shot today. Treated with cetirizine 20mg and no improvement. Then treated with EpiPen and improvement in symptoms.     5/2021: Flushing, heaviness in face. Treated with EpiPen. Felt light headed and going to pass out. Treated with cetirizine 20mg and Prednisone 60mg. She otherwise developed itching of eyes, watering of eyes and scalp itching. All symptoms resolved. Observed for over 2 hours post epipen administration. Stable for discharge to home. Discussed when to return to ER  and use EpiPen with patient.            - Flonase 2 sprays/nostril daily.   - Zyrtec as needed.   - Discontinue allergy shots.         Relevant Medications    cetirizine (zyrTEC) tablet 20 mg (Completed)    Seasonal allergic rhinitis due to pollen    Relevant Medications    cetirizine (zyrTEC) tablet 20 mg (Completed)      Other Visit Diagnoses     Anaphylaxis due to allergen immunotherapy    -  Primary    Relevant Medications    cetirizine (zyrTEC) tablet 20 mg (Completed)    predniSONE (DELTASONE) tablet 60 mg (Completed)          Chart documentation with Dragon Voice recognition Software. Although reviewed after completion, some words and grammatical errors may remain.    Ariel Espinoza DO FAAAAI  Medical Director for Allergy/Immunology at [place], [place]

## 2021-05-18 NOTE — PATIENT INSTRUCTIONS
Allergy Staff Appt Hours Shot Hours Locations    Physician     Ariel Espinoza DO       Support Staff     NARGIS Naidu CMA  Tuesday:        [place] 7-5 Wednesday:        [place]: 7-5 Thursday:                    [place] 7-6     Friday:  [place]  7-2   [place]        Thursday: 8-5:20        Friday: 7-12     [place]        Tuesday: 7- 3:20 Wednesday: 7-4:20     [place] Monday: 7-2:20 Tuesday: 9-5:20         [facility]  [address] [place] [address]  Appt Line: [phone]  Allergy RN:  [phone]    [facility]  [address] [place] [address]  Appt Line: [phone]  Allergy RN:  [phone]       Important Scheduling Information  Aspirin Desensitization: Appt will last 2 clinic days. Please call the Allergy RN line for your clinic to schedule. Discontinue antihistamines 7 days prior to the appointment.     Food Challenges: Appt will last 3-4 hours. Please call the Allergy RN line for your clinic to schedule. Discontinue antihistamines 7 days prior to the appointment.     Penicillin Testing: Appt will last 2-3 hours. Please call the Allergy RN line for your clinic to schedule. Discontinue antihistamines 7 days prior to the appointment.     Skin Testing: Appt will about 40 minutes. Call the appointment line for your clinic to schedule. Discontinue antihistamines 7 days prior to the appointment.     Venom Testing: Appt will last 2-3 hours. Please call the Allergy RN line for your clinic to schedule. Discontinue antihistamines 7 days prior to the appointment.     Thank you for trusting us with your Allergy, Asthma, and Immunology care. Please feel free to contact us with any questions or concerns you may have.

## 2021-05-18 NOTE — ASSESSMENT & PLAN NOTE
Nasal and ocular symptoms in the spring and fall. Symptoms when travels to southern clim[place] regardless of time of the year.     Allergy testing positive for dust mites and grass. Equivocal for ragweed. Positive for ragweed on blood testing.      2/2021:Tongue itching and burning with abdominal pain 20 minutes after receiving allergy shot today. Treated with cetirizine 20mg and no improvement. Then treated with EpiPen and improvement in symptoms.     5/2021: Flushing, heaviness in face. Treated with EpiPen. Felt light headed and going to pass out. Treated with cetirizine 20mg and Prednisone 60mg. She otherwise developed itching of eyes, watering of eyes and scalp itching. All symptoms resolved. Observed for over 2 hours post epipen administration. Stable for discharge to home. Discussed when to return to ER and use EpiPen with patient.            - Flonase 2 sprays/nostril daily.   - Zyrtec as needed.   - Discontinue allergy shots.

## 2021-05-18 NOTE — PROGRESS NOTES
SYSTEMIC REACTION/ ANAPHYLAXIS TREATMENT RECORD    Prior systemic reaction: Yes    History of asthma: No    Provider responding to medical emergency: A Olga      TIME PATIENT RECEIVED FROM [facility] CLINIC: 1128      RECORD OF CURRENT REACTION. DID YOU:             Assess airway and breathing? Yes            Administer IM epinephrine? Yes            Activate EMS (call 911 or local rescue team)? No                 SYMPTOMS             RESPIRATORY: Shortness of Breath            SKIN: Flushing, generalized itching            EYE/ NASAL: Eye Redness            VASCULAR: Dizziness and Headache            OTHER: Diaphoresis, head feels heavy, loss of consciousness after epi pen with convulsions      VITAL SIGNS    Time BP Pulse O2     1128 107/73 96 97 Epi pen   1134 128/78 66 99    1140 107/73 70 98    1147 109/74 77 100            1259       120/77           67           98          1210      113/78          68           98          1225             120/73                 73                     98          1239              122/77                76                     100          1250             110/75                 76                      100          1320              129/81                76                       98           1357             112/74                78                        97  MEDICATIONS ADMINISTERED (ONCE PATIENT WITH PROVIDER TEAM)    Time Medication Dose Route   1129 epi 0.3mg Sub q   1137 zyrtec 20mg oral   1159 prednisone 60mg oral           Per provider verbal order, the following medication was given:     MEDICATION:Epinephrine   ROUTE: SQ  SITE: Thigh - Left  DOSE: 0.3mg   LOT #: [id number]  :  [employer]  EXPIRATION DATE:  12/31/21  NDC#: [id number]     Per provider verbal order, the following medication was given:     MEDICATION:Cetirizine  ROUTE: PO  SITE: mouth  DOSE: 20mg  LOT #: [id number]  :  [employer]  EXPIRATION DATE:  9/21  NDC#:  [id number]       Per provider verbal order, the following medication was given:     MEDICATION:prednisone  ROUTE: PO  SITE: mouth  DOSE: 60mg  LOT #: [id number]  :  [employer]  EXPIRATION DATE:  06/21  NDC#: [id number]     Time of discharge:   1413    Condition upon release:    stable    Patient instructions:   Follow up with MD in one month    Vanessa ESTRADA RN Specialty Triage 5/18/2021 2:14 PM

## 2021-07-20 ENCOUNTER — VIRTUAL VISIT (OUTPATIENT)
Dept: ALLERGY | Facility: OTHER | Age: 46
End: 2021-07-20
Payer: COMMERCIAL

## 2021-07-20 VITALS — HEIGHT: 64 IN | WEIGHT: 200 LBS | BODY MASS INDEX: 34.15 KG/M2

## 2021-07-20 DIAGNOSIS — J30.89 ALLERGIC RHINITIS DUE TO DUST MITE: Primary | ICD-10-CM

## 2021-07-20 DIAGNOSIS — J30.1 SEASONAL ALLERGIC RHINITIS DUE TO POLLEN: ICD-10-CM

## 2021-07-20 PROCEDURE — 99213 OFFICE O/P EST LOW 20 MIN: CPT | Mod: 95 | Performed by: ALLERGY & IMMUNOLOGY

## 2021-07-20 RX ORDER — AZELASTINE 1 MG/ML
2 SPRAY, METERED NASAL 2 TIMES DAILY
Qty: 30 ML | Refills: 3 | Status: SHIPPED | OUTPATIENT
Start: 2021-07-20

## 2021-07-20 RX ORDER — MONTELUKAST SODIUM 10 MG/1
TABLET ORAL
Qty: 3 TABLET | Refills: 0 | Status: SHIPPED | OUTPATIENT
Start: 2021-07-20 | End: 2021-07-27

## 2021-07-20 ASSESSMENT — MIFFLIN-ST. JEOR: SCORE: 1527.19

## 2021-07-20 NOTE — PROGRESS NOTES
"Sun Andrade is a 46 year old female who is being evaluated via a billable video visit.      The patient has been notified of following:      \"This video visit will be conducted via a call between you and your physician/provider. We have found that certain health care needs can be provided without the need for an in-person physical exam.  This service lets us provide the care you need with a video conversation.  If a prescription is necessary we can send it directly to your pharmacy.  If lab work is needed we can place an order for that and you can then stop by our lab to have the test done at a later time.     If during the course of the call the physician/provider feels a video visit is not appropriate, you will not be charged for this service.\"    Patient has given verbal consent for Video visit? Yes     Patient would like the video invitation sent by: [phone]     I have reviewed and updated the patient's Past Medical History, Social History, Family History and Medication List.    The last few weeks she has been doing okay. In summer and spring had ocular itching, sneezing. Pulled up carpet recently and similar symptoms. Had congestion as well. For medications she was using Xyzal, Flonase 2 sprays/nostril daily. Not on Singulair. Was on in past and tolerated.  Using Pataday 1 drop as needed. This is not helpful.       Past Medical History:   Diagnosis Date     Anxiety 11/18/2008     Attention deficit disorder of adult 11/18/2008     Depression 11/18/2008     Enlargement - tonsil/adenoid 11/18/2008     Itching 11/18/2008    in response to multiple medications-- see allergy list     Sleep apnea     mild; felt related to adenoids; no treatment     Family History   Problem Relation Age of Onset     Depression Mother      Breast Cancer Maternal Grandmother      Depression Maternal Grandmother      Diabetes Maternal Grandfather      C.A.D. Paternal Grandfather         CHF     Diabetes Paternal Grandmother      " Asthma Daughter      Past Surgical History:   Procedure Laterality Date     C  DELIVERY ONLY       LAPAROSCOPY,TUBAL CAUTERY       SURGICAL HISTORY OF -   09    discectomy L5-S1 for herniated disc       REVIEW OF SYSTEMS:  General: negative for weight gain. negative for weight loss. negative for changes in sleep.   Ears: negative for fullness. negative for hearing loss. negative for dizziness.   Nose: negative for snoring.negative for changes in smell. negative for drainage.   Eyes: negative for eye watering. negative for eye itching. negative for vision changes. negative for eye redness.  Throat: negative for hoarseness. negative for sore throat. negative for trouble swallowing.   Lungs: negative for shortness of breath.negative for wheezing. negative for sputum production.   Cardiovascular: negative for chest pain. negative for swelling of ankles. negative for fast or irregular heartbeat.   Gastrointestinal: negative for nausea. negative for heartburn. negative for acid reflux.   Musculoskeletal: negative for joint pain. negative for joint stiffness. negative for joint swelling.   Neurologic: negative for seizures. negative for fainting. negative for weakness.   Psychiatric: negative for changes in mood. negative for anxiety.   Endocrine: negative for cold intolerance. negative for heat intolerance. negative for tremors.   Lymphatic: negative for lower extremity swelling. negative for lymph node swelling.   Hematologic: negative for easy bruising. negative for easy bleeding.  Integumentary: negative for rash. negative for scaling. negative for nail changes.       Current Outpatient Medications:      Ascorbic Acid (VITAMIN C) 100 MG CHEW, , Disp: , Rfl:      azelastine (ASTELIN) 0.1 % nasal spray, Spray 2 sprays into both nostrils 2 times daily, Disp: 30 mL, Rfl: 3     buPROPion (WELLBUTRIN SR) 150 MG 12 hr tablet, Take 150 mg by mouth, Disp: , Rfl:      calcium-vitamin D (OSCAL) 250-125 MG-UNIT  TABS per tablet, Take 1 tablet by mouth 2 times daily, Disp: , Rfl:      cetirizine (ZYRTEC) 10 MG tablet, Take 10 mg by mouth daily, Disp: , Rfl:      Ferrous Sulfate (IRON) 325 (65 Fe) MG tablet, Take 325 mg by mouth, Disp: , Rfl:      fish oil-omega-3 fatty acids 1000 MG capsule, Take 1,000 mg by mouth, Disp: , Rfl:      montelukast (SINGULAIR) 10 MG tablet, 10mg daily beginning two days prior to rush immunotherapy and take the morning of rush immunotherapy., Disp: 3 tablet, Rfl: 0     Multiple Vitamin (MULTI-VITAMIN DAILY PO), Take 1 tablet by mouth, Disp: , Rfl:      naproxen sodium (ANAPROX) 220 MG tablet, Take 220 mg by mouth 2 times daily (with meals), Disp: , Rfl:      trimethoprim-polymyxin b (POLYTRIM) ophthalmic solution, Place 1 drop into both eyes 4 times daily., Disp: 1 Bottle, Rfl: 0  Immunization History   Administered Date(s) Administered     HepB 05/31/2011     MMR 05/31/2011     Mantoux Tuberculin Skin Test 05/31/2011     TDAP Vaccine (Adacel) 01/19/2011     Allergies   Allergen Reactions     Ativan Itching     Cephalosporins      itching     Dilaudid [Hydromorphone Hcl]      Itching     Hyoscyamine      itching     Oxycodone      Itching     Pcn [Penicillins]      itching     Sulfa Drugs      Rash and itching     Ultram [Tramadol]      itching     Vicodin [Acetaminophen]      itching         EXAM:   Constitutional:  Appears well-developed and well-nourished. No distress.   HEENT:   Head: Normocephalic.   Neuro: Oriented to person, place, and time.  Skin: Skin is warm and dry. No rash noted.   Psychiatric: Normal mood and affect.     Nursing note and vitals reviewed.    ASSESSMENT/PLAN:  Problem List Items Addressed This Visit        Respiratory    Allergic rhinitis due to dust mite - Primary     Nasal and ocular symptoms in the spring and fall. Symptoms when travels to [place] regardless of time of the year.     Allergy testing positive for dust mites and grass. Equivocal for  ragweed. Positive for ragweed on blood testing.      2/2021:Tongue itching and burning with abdominal pain 20 minutes after receiving allergy shot. Treated with cetirizine 20mg and no improvement. Then treated with EpiPen and improvement in symptoms.      5/2021: Flushing, heaviness in face. Treated with EpiPen. Felt light headed and going to pass out. Treated with cetirizine 20mg and Prednisone 60mg. She otherwise developed itching of eyes, watering of eyes and scalp itching. All symptoms resolved.     Allergy shots were discontinued.      - Flonase 2 sprays/nostril daily.   - Xyzal as needed.   - Pataday ES 1 drop/eye daily.   - Singulair 10mg by mouth daily at night.   - Azelastine 2 sprays/nostril twice daily as needed.            Relevant Medications    montelukast (SINGULAIR) 10 MG tablet    azelastine (ASTELIN) 0.1 % nasal spray    Seasonal allergic rhinitis due to pollen    Relevant Medications    montelukast (SINGULAIR) 10 MG tablet    azelastine (ASTELIN) 0.1 % nasal spray          Chart documentation with Dragon Voice recognition Software. Although reviewed after completion, some words and grammatical errors may remain.    I have reviewed the note as documented above.  This accurately captures the substance of my conversation with the patient.    Video visit contact time  Video visit started at 1104  Video visit ended at 1114    Video-Visit Details     Type of service:  Video Visit     Originating Location (pt. Location): Home     Distant Location (provider location):  [facility]     Mode of Communication:  Video Conference via Doximity    DO SHANNON Lindsay  Allergy/Immunology  [facility] and [place], [place]

## 2021-07-20 NOTE — PATIENT INSTRUCTIONS
Allergy Staff Appt Hours Shot Hours Locations    Physician     Ariel Espinoza DO       Support Staff     NARGIS Naidu CMA  Tuesday:   [place] 7-5 Wednesday:  [place] 7-5 Thursday:                    [place] 7-6     Friday:  [place]  7-2   [place]        Thursday: 7-5:20        Friday: 7-12:20     [place]        Tuesday: 7- 3:20 Wednesday: 7-4:20 [place] Monday: 7-2:20 Tuesday: 9-5:20         [facility]  [address] [place] [address]  Appt Line: [phone]      [facility]  [address] [place] [address]  Appt Line: [phone]         Important Scheduling Information  Aspirin Desensitization: Appt will last 2 clinic days. Please call the Allergy RN line for your clinic to schedule. Discontinue antihistamines 7 days prior to the appointment.     Food Challenges: Appt will last 3-4 hours. Please call the Allergy RN line for your clinic to schedule. Discontinue antihistamines 7 days prior to the appointment.     Penicillin Testing: Appt will last 2-3 hours. Please call the Allergy RN line for your clinic to schedule. Discontinue antihistamines 7 days prior to the appointment.     Skin Testing: Appt will about 40 minutes. Call the appointment line for your clinic to schedule. Discontinue antihistamines 7 days prior to the appointment.     Venom Testing: Appt will last 2-3 hours. Please call the Allergy RN line for your clinic to schedule. Discontinue antihistamines 7 days prior to the appointment.     Thank you for trusting us with your Allergy, Asthma, and Immunology care. Please feel free to contact us with any questions or concerns you may have.       - Flonase 2 sprays/nostril daily.   - Xyzal as needed.   - Pataday ES 1 drop/eye daily.   - Singulair 10mg by mouth daily at night.   - Azelastine 2 sprays/nostril twice daily as needed.

## 2021-07-20 NOTE — ASSESSMENT & PLAN NOTE
Nasal and ocular symptoms in the spring and fall. Symptoms when [place] to [place] regardless of time of the year.     Allergy testing positive for dust mites and grass. Equivocal for ragweed. Positive for ragweed on blood testing.      2/2021:Tongue itching and burning with abdominal pain 20 minutes after receiving allergy shot. Treated with cetirizine 20mg and no improvement. Then treated with EpiPen and improvement in symptoms.      5/2021: Flushing, heaviness in face. Treated with EpiPen. Felt light headed and going to pass out. Treated with cetirizine 20mg and Prednisone 60mg. She otherwise developed itching of eyes, watering of eyes and scalp itching. All symptoms resolved.     Allergy shots were discontinued.      - Flonase 2 sprays/nostril daily.   - Xyzal as needed.   - Pataday ES 1 drop/eye daily.   - Singulair 10mg by mouth daily at night.   - Azelastine 2 sprays/nostril twice daily as needed.

## 2021-07-27 DIAGNOSIS — J30.1 SEASONAL ALLERGIC RHINITIS DUE TO POLLEN: ICD-10-CM

## 2021-07-27 DIAGNOSIS — J30.89 ALLERGIC RHINITIS DUE TO DUST MITE: ICD-10-CM

## 2021-07-27 RX ORDER — MONTELUKAST SODIUM 10 MG/1
10 TABLET ORAL AT BEDTIME
Qty: 30 TABLET | Refills: 5 | Status: SHIPPED | OUTPATIENT
Start: 2021-07-27

## 2021-07-27 NOTE — TELEPHONE ENCOUNTER
Patient is calling to get a new Rx for montelukast (SINGULAIR) 10 MG tablet. When we sent the Rx it was only sent for 3 tablets and she takes this medication daily. Please send in a new Rx for patient.  Casie Watkins   [facility]  Central Scheduler

## 2021-07-27 NOTE — TELEPHONE ENCOUNTER
Per 7/20/21 note, patient is to start Singulair 10mg at night.  Rx was sent with instructions for rush immunotherapy.  Updated sig, will send to allergist to send first script with new instructions.  Patient aware provider out of clinic, may not be sent until next week.    Jackie Overton RN

## 2021-10-24 ENCOUNTER — HEALTH MAINTENANCE LETTER (OUTPATIENT)
Age: 46
End: 2021-10-24

## 2022-02-13 ENCOUNTER — HEALTH MAINTENANCE LETTER (OUTPATIENT)
Age: 47
End: 2022-02-13

## 2022-04-10 ENCOUNTER — HEALTH MAINTENANCE LETTER (OUTPATIENT)
Age: 47
End: 2022-04-10

## 2022-10-15 ENCOUNTER — HEALTH MAINTENANCE LETTER (OUTPATIENT)
Age: 47
End: 2022-10-15

## 2022-10-26 NOTE — LETTER
"    7/20/2021         RE: Sun Andrade  [address] [facility] [address]        Dear Colleague,    Thank you for referring your patient, Sun Andrade, to the [facility]. Please see a copy of my visit note below.    Sun Andrade is a 46 year old female who is being evaluated via a billable video visit.      The patient has been notified of following:      \"This video visit will be conducted via a call between you and your physician/provider. We have found that certain health care needs can be provided without the need for an in-person physical exam.  This service lets us provide the care you need with a video conversation.  If a prescription is necessary we can send it directly to your pharmacy.  If lab work is needed we can place an order for that and you can then stop by our lab to have the test done at a later time.     If during the course of the call the physician/provider feels a video visit is not appropriate, you will not be charged for this service.\"    Patient has given verbal consent for Video visit? Yes     Patient would like the video invitation sent by: [phone]     I have reviewed and updated the patient's Past Medical History, Social History, Family History and Medication List.    The last few weeks she has been doing okay. In summer and spring had ocular itching, sneezing. Pulled up carpet recently and similar symptoms. Had congestion as well. For medications she was using Xyzal, Flonase 2 sprays/nostril daily. Not on Singulair. Was on in past and tolerated.  Using Pataday 1 drop as needed. This is not helpful.       Past Medical History:   Diagnosis Date     Anxiety 11/18/2008     Attention deficit disorder of adult 11/18/2008     Depression 11/18/2008     Enlargement - tonsil/adenoid 11/18/2008     Itching 11/18/2008    in response to multiple medications-- see allergy list     Sleep apnea     mild; felt related to adenoids; no treatment     Family History " Comprehensive Nutrition Assessment    Type and Reason for Visit: Initial    Nutrition Recommendations/Plan:   Continue Low Na diet - consider adding 4 carb choice or NCS to help control BG. PO reported as low although flowsheet indicates >75% of intake  Added Ensure Enlive Chocolate BID - and Kirill BID to try. Monitor weight - noted diuretic increased. Monitor Po intake     Malnutrition Assessment:  Malnutrition Status: At risk for malnutrition (specify) (10/26/22 1222)    Context:  Chronic illness     Findings of the 6 clinical characteristics of malnutrition:   Energy Intake:  Mild decrease in energy intake (specify)  Weight Loss:  No significant weight loss     Body Fat Loss:  No significant body fat loss,     Muscle Mass Loss:  No significant muscle mass loss,    Fluid Accumulation:  Mild,     Strength:  Not performed     Nutrition Assessment:         Pt admitted with CHF (congestive heart failure) ([facility] [I50.9]    Past Medical History:   Diagnosis Date    CKD (chronic kidney disease), stage III ([facility])     Diabetes mellitus type 2 in obese ([facility]     Hypertension     Hypothyroidism     NICM (nonischemic cardiomyopathy) ([facility]     PAF (paroxysmal atrial fibrillation) ([facility])     Severe mitral regurgitation     Vitamin D deficiency      Pt screened for LOS. Pt reports he doesn't eat beef, pork, eggs, drink milk or drink tea. Reports avoiding eggs 2/2 ammonia. Flow sheet indicates pt eats well, but pt reports low appetite and eating a few bites for breakfast. Pt states he eats turkey sausage and strawberries for breakfast. Dicussed calling kitchen for food preferences. Pt reports UBW of 216 lbs. Currently standing scale on 10/24 of 248 lbs. NP to increase diuretics. Pt drinks Strawberry Boost at home, but doesn't like Ensure - encouraged to trial. Pt agreeable to chocolate to dry. Added Kirill BID as well. Noted food wounds.            Last BM: 10/25/22, Formed    PO intake: Patient Vitals for the past 168 hrs:   %   Problem Relation Age of Onset     Depression Mother      Breast Cancer Maternal Grandmother      Depression Maternal Grandmother      Diabetes Maternal Grandfather      C.A.D. Paternal Grandfather         CHF     Diabetes Paternal Grandmother      Asthma Daughter      Past Surgical History:   Procedure Laterality Date     C  DELIVERY ONLY       LAPAROSCOPY,TUBAL CAUTERY       SURGICAL HISTORY OF -   09    discectomy L5-S1 for herniated disc       REVIEW OF SYSTEMS:  General: negative for weight gain. negative for weight loss. negative for changes in sleep.   Ears: negative for fullness. negative for hearing loss. negative for dizziness.   Nose: negative for snoring.negative for changes in smell. negative for drainage.   Eyes: negative for eye watering. negative for eye itching. negative for vision changes. negative for eye redness.  Throat: negative for hoarseness. negative for sore throat. negative for trouble swallowing.   Lungs: negative for shortness of breath.negative for wheezing. negative for sputum production.   Cardiovascular: negative for chest pain. negative for swelling of ankles. negative for fast or irregular heartbeat.   Gastrointestinal: negative for nausea. negative for heartburn. negative for acid reflux.   Musculoskeletal: negative for joint pain. negative for joint stiffness. negative for joint swelling.   Neurologic: negative for seizures. negative for fainting. negative for weakness.   Psychiatric: negative for changes in mood. negative for anxiety.   Endocrine: negative for cold intolerance. negative for heat intolerance. negative for tremors.   Lymphatic: negative for lower extremity swelling. negative for lymph node swelling.   Hematologic: negative for easy bruising. negative for easy bleeding.  Integumentary: negative for rash. negative for scaling. negative for nail changes.       Current Outpatient Medications:      Ascorbic Acid (VITAMIN C) 100 MG CHEW, , Disp: , Rfl:  Diet Eaten   10/26/22 0848 51 - 75%   10/25/22 1836 76 - 100%   10/25/22 1406 76 - 100%   10/25/22 1006 76 - 100%   10/25/22 0920 76 - 100%   10/23/22 1200 76 - 100%   10/23/22 0801 76 - 100%   10/22/22 0852 26 - 50%   10/21/22 1551 76 - 100%   10/21/22 1118 76 - 100%   10/21/22 0805 76 - 100%   10/20/22 1533 76 - 100%   10/20/22 1137 76 - 100%   10/20/22 0906 76 - 100%       Wt Readings from Last 30 Encounters:   10/24/22 112.6 kg (248 lb 3.8 oz)   12/16/21 131.6 kg (290 lb 3.2 oz)   05/28/21 102.2 kg (225 lb 5 oz)   10/01/19 90.3 kg (199 lb)   09/17/19 86.5 kg (190 lb 12.8 oz)   09/12/19 86.9 kg (191 lb 8 oz)   09/04/19 81.6 kg (180 lb)   12/05/13 101.6 kg (224 lb)   11/05/13 99.8 kg (220 lb)           Nutrition Related Findings:      Wound Type: Multiple    Edema: Facial: 2+ (10/26/2022  8:48 AM)  Generalized: Anasarca (10/26/2022  8:48 AM)  LLE: 3+; Non-pitting (10/26/2022  8:48 AM)  LUE: Trace (10/26/2022  8:48 AM)  RLE: 3+; Non-pitting (10/26/2022  8:48 AM)  RUE: Trace (10/26/2022  8:48 AM)      Current Nutrition Intake & Therapies:  Average Meal Intake: 1-25%  Average Supplement Intake: None ordered  DIET ONE TIME MESSAGE  ADULT DIET Regular; Low Sodium (2 gm); No tea, No beef or pork  ADULT ORAL NUTRITION SUPPLEMENT Dinner, Breakfast; Standard High Calorie/High Protein  ADULT ORAL NUTRITION SUPPLEMENT Dinner, Lunch; Wound Healing Supplement    Anthropometric Measures:  Height: 5' 9.02\" (175.3 cm)  Ideal Body Weight (IBW): 160 lbs (73 kg)     Current Body Wt:  112.6 kg (248 lb 3.8 oz), 155.1 % IBW. Standing scale  Current BMI (kg/m2): 36.6        Weight Adjustment: No adjustment                 BMI Category: Obese class 2 (BMI 35.0-39. 9)    Estimated Daily Nutrient Needs:  Energy Requirements Based On: Formula  Weight Used for Energy Requirements: Current  Energy (kcal/day): 2174  Weight Used for Protein Requirements: Current  Protein (g/day): 135  Method Used for Fluid Requirements: 1 ml/kcal  Fluid      azelastine (ASTELIN) 0.1 % nasal spray, Spray 2 sprays into both nostrils 2 times daily, Disp: 30 mL, Rfl: 3     buPROPion (WELLBUTRIN SR) 150 MG 12 hr tablet, Take 150 mg by mouth, Disp: , Rfl:      calcium-vitamin D (OSCAL) 250-125 MG-UNIT TABS per tablet, Take 1 tablet by mouth 2 times daily, Disp: , Rfl:      cetirizine (ZYRTEC) 10 MG tablet, Take 10 mg by mouth daily, Disp: , Rfl:      Ferrous Sulfate (IRON) 325 (65 Fe) MG tablet, Take 325 mg by mouth, Disp: , Rfl:      fish oil-omega-3 fatty acids 1000 MG capsule, Take 1,000 mg by mouth, Disp: , Rfl:      montelukast (SINGULAIR) 10 MG tablet, 10mg daily beginning two days prior to rush immunotherapy and take the morning of rush immunotherapy., Disp: 3 tablet, Rfl: 0     Multiple Vitamin (MULTI-VITAMIN DAILY PO), Take 1 tablet by mouth, Disp: , Rfl:      naproxen sodium (ANAPROX) 220 MG tablet, Take 220 mg by mouth 2 times daily (with meals), Disp: , Rfl:      trimethoprim-polymyxin b (POLYTRIM) ophthalmic solution, Place 1 drop into both eyes 4 times daily., Disp: 1 Bottle, Rfl: 0  Immunization History   Administered Date(s) Administered     HepB 05/31/2011     MMR 05/31/2011     Mantoux Tuberculin Skin Test 05/31/2011     TDAP Vaccine (Adacel) 01/19/2011     Allergies   Allergen Reactions     Ativan Itching     Cephalosporins      itching     Dilaudid [Hydromorphone Hcl]      Itching     Hyoscyamine      itching     Oxycodone      Itching     Pcn [Penicillins]      itching     Sulfa Drugs      Rash and itching     Ultram [Tramadol]      itching     Vicodin [Acetaminophen]      itching         EXAM:   Constitutional:  Appears well-developed and well-nourished. No distress.   HEENT:   Head: Normocephalic.   Neuro: Oriented to person, place, and time.  Skin: Skin is warm and dry. No rash noted.   Psychiatric: Normal mood and affect.     Nursing note and vitals reviewed.    ASSESSMENT/PLAN:  Problem List Items Addressed This Visit        Respiratory     (ml/day): 2000    Nutrition Diagnosis:   Inadequate protein-energy intake related to increased demand for energy/nutrients, cardiac dysfunction as evidenced by intake 0-25%, wounds    Nutrition Interventions:   Food and/or Nutrient Delivery: Continue current diet, Start oral nutrition supplement  Nutrition Education/Counseling: No recommendations at this time  Coordination of Nutrition Care: Continue to monitor while inpatient, Interdisciplinary rounds       Goals:     Goals: PO intake 50% or greater, by next RD assessment       Nutrition Monitoring and Evaluation:   Behavioral-Environmental Outcomes: Beliefs and attitudes, Knowledge or skill  Food/Nutrient Intake Outcomes: Food and nutrient intake, Supplement intake  Physical Signs/Symptoms Outcomes: Biochemical data, Weight, Skin    Discharge Planning:    Continue oral nutrition supplement, Recommend pursue outpatient nutrition counseling    Bessie Corral, [address]: [phone] Allergic rhinitis due to dust mite - Primary     Nasal and ocular symptoms in the spring and fall. Symptoms when travels to [place] regardless of time of the year.     Allergy testing positive for dust mites and grass. Equivocal for ragweed. Positive for ragweed on blood testing.      2/2021:Tongue itching and burning with abdominal pain 20 minutes after receiving allergy shot. Treated with cetirizine 20mg and no improvement. Then treated with EpiPen and improvement in symptoms.      5/2021: Flushing, heaviness in face. Treated with EpiPen. Felt light headed and going to pass out. Treated with cetirizine 20mg and Prednisone 60mg. She otherwise developed itching of eyes, watering of eyes and scalp itching. All symptoms resolved.     Allergy shots were discontinued.      - Flonase 2 sprays/nostril daily.   - Xyzal as needed.   - Pataday ES 1 drop/eye daily.   - Singulair 10mg by mouth daily at night.   - Azelastine 2 sprays/nostril twice daily as needed.            Relevant Medications    montelukast (SINGULAIR) 10 MG tablet    azelastine (ASTELIN) 0.1 % nasal spray    Seasonal allergic rhinitis due to pollen    Relevant Medications    montelukast (SINGULAIR) 10 MG tablet    azelastine (ASTELIN) 0.1 % nasal spray          Chart documentation with Dragon Voice recognition Software. Although reviewed after completion, some words and grammatical errors may remain.    I have reviewed the note as documented above.  This accurately captures the substance of my conversation with the patient.    Video visit contact time  Video visit started at 1104  Video visit ended at 1114    Video-Visit Details     Type of service:  Video Visit     Originating Location (pt. Location): Home     Distant Location (provider location):  [facility]     Mode of Communication:  Video Conference via Doximity    Ariel Espinoza DO FAAAAI  Allergy/Immunology  [facility], [place]      Again, thank you  for allowing me to participate in the care of your patient.        Sincerely,        Ariel Espinoza MD

## 2023-03-26 ENCOUNTER — HEALTH MAINTENANCE LETTER (OUTPATIENT)
Age: 48
End: 2023-03-26

## 2023-06-01 ENCOUNTER — HEALTH MAINTENANCE LETTER (OUTPATIENT)
Age: 48
End: 2023-06-01

## 2023-09-12 ENCOUNTER — LAB REQUISITION (OUTPATIENT)
Dept: LAB | Facility: CLINIC | Age: 48
End: 2023-09-12
Payer: COMMERCIAL

## 2023-09-12 DIAGNOSIS — R35.0 FREQUENCY OF MICTURITION: ICD-10-CM

## 2023-09-12 LAB
ERYTHROCYTE [DISTWIDTH] IN BLOOD BY AUTOMATED COUNT: 11.9 % (ref 10–15)
HCT VFR BLD AUTO: 37.2 % (ref 35–47)
HGB BLD-MCNC: 12.7 G/DL (ref 11.7–15.7)
MCH RBC QN AUTO: 31 PG (ref 26.5–33)
MCHC RBC AUTO-ENTMCNC: 34.1 G/DL (ref 31.5–36.5)
MCV RBC AUTO: 91 FL (ref 78–100)
PLATELET # BLD AUTO: 354 10E3/UL (ref 150–450)
RBC # BLD AUTO: 4.1 10E6/UL (ref 3.8–5.2)
WBC # BLD AUTO: 8.5 10E3/UL (ref 4–11)

## 2023-09-12 PROCEDURE — 87086 URINE CULTURE/COLONY COUNT: CPT | Mod: ORL | Performed by: OBSTETRICS & GYNECOLOGY

## 2023-09-12 PROCEDURE — 85027 COMPLETE CBC AUTOMATED: CPT | Mod: ORL | Performed by: OBSTETRICS & GYNECOLOGY

## 2023-09-14 LAB — BACTERIA UR CULT: NO GROWTH

## 2023-12-04 ENCOUNTER — LAB REQUISITION (OUTPATIENT)
Dept: LAB | Facility: CLINIC | Age: 48
End: 2023-12-04
Payer: COMMERCIAL

## 2023-12-04 DIAGNOSIS — R30.0 DYSURIA: ICD-10-CM

## 2023-12-04 PROCEDURE — 87086 URINE CULTURE/COLONY COUNT: CPT | Mod: ORL | Performed by: OBSTETRICS & GYNECOLOGY

## 2023-12-06 LAB — BACTERIA UR CULT: ABNORMAL

## 2023-12-18 ENCOUNTER — LAB REQUISITION (OUTPATIENT)
Dept: LAB | Facility: CLINIC | Age: 48
End: 2023-12-18
Payer: COMMERCIAL

## 2023-12-18 DIAGNOSIS — R39.9 UNSPECIFIED SYMPTOMS AND SIGNS INVOLVING THE GENITOURINARY SYSTEM: ICD-10-CM

## 2023-12-18 PROCEDURE — 87086 URINE CULTURE/COLONY COUNT: CPT | Mod: ORL | Performed by: OBSTETRICS & GYNECOLOGY

## 2023-12-18 PROCEDURE — 87186 SC STD MICRODIL/AGAR DIL: CPT | Mod: ORL | Performed by: OBSTETRICS & GYNECOLOGY

## 2023-12-20 LAB — BACTERIA UR CULT: ABNORMAL

## 2024-05-26 ENCOUNTER — HEALTH MAINTENANCE LETTER (OUTPATIENT)
Age: 49
End: 2024-05-26

## 2024-06-05 ENCOUNTER — THERAPY VISIT (OUTPATIENT)
Dept: PHYSICAL THERAPY | Facility: CLINIC | Age: 49
End: 2024-06-05
Payer: COMMERCIAL

## 2024-06-05 DIAGNOSIS — M25.561 RIGHT KNEE PAIN: Primary | ICD-10-CM

## 2024-06-05 DIAGNOSIS — M54.50 CHRONIC BILATERAL LOW BACK PAIN, UNSPECIFIED WHETHER SCIATICA PRESENT: ICD-10-CM

## 2024-06-05 DIAGNOSIS — G89.29 CHRONIC BILATERAL LOW BACK PAIN, UNSPECIFIED WHETHER SCIATICA PRESENT: ICD-10-CM

## 2024-06-05 PROCEDURE — 97162 PT EVAL MOD COMPLEX 30 MIN: CPT | Mod: GP | Performed by: PHYSICAL THERAPIST

## 2024-06-05 PROCEDURE — 97110 THERAPEUTIC EXERCISES: CPT | Mod: GP | Performed by: PHYSICAL THERAPIST

## 2024-06-05 ASSESSMENT — ACTIVITIES OF DAILY LIVING (ADL)
STIFFNESS: THE SYMPTOM AFFECTS MY ACTIVITY MODERATELY
HOW_WOULD_YOU_RATE_THE_OVERALL_FUNCTION_OF_YOUR_KNEE_DURING_YOUR_USUAL_DAILY_ACTIVITIES?: ABNORMAL
GIVING WAY, BUCKLING OR SHIFTING OF KNEE: THE SYMPTOM AFFECTS MY ACTIVITY SEVERELY
RISE FROM A CHAIR: ACTIVITY IS FAIRLY DIFFICULT
KNEE_ACTIVITY_OF_DAILY_LIVING_SCORE: 40
AS_A_RESULT_OF_YOUR_KNEE_INJURY,_HOW_WOULD_YOU_RATE_YOUR_CURRENT_LEVEL_OF_DAILY_ACTIVITY?: ABNORMAL
PAIN: THE SYMPTOM AFFECTS MY ACTIVITY SEVERELY
GO DOWN STAIRS: ACTIVITY IS VERY DIFFICULT
RAW_SCORE: 28
SIT WITH YOUR KNEE BENT: ACTIVITY IS SOMEWHAT DIFFICULT
WALK: ACTIVITY IS FAIRLY DIFFICULT
SQUAT: ACTIVITY IS VERY DIFFICULT
SWELLING: I DO NOT HAVE THE SYMPTOM
HOW_WOULD_YOU_RATE_THE_CURRENT_FUNCTION_OF_YOUR_KNEE_DURING_YOUR_USUAL_DAILY_ACTIVITIES_ON_A_SCALE_FROM_0_TO_100_WITH_100_BEING_YOUR_LEVEL_OF_KNEE_FUNCTION_PRIOR_TO_YOUR_INJURY_AND_0_BEING_THE_INABILITY_TO_PERFORM_ANY_OF_YOUR_USUAL_DAILY_ACTIVITIES?: 40
KNEE_ACTIVITY_OF_DAILY_LIVING_SUM: 28
WEAKNESS: THE SYMPTOM AFFECTS MY ACTIVITY SLIGHTLY
GO UP STAIRS: ACTIVITY IS VERY DIFFICULT
LIMPING: THE SYMPTOM AFFECTS MY ACTIVITY MODERATELY
KNEEL ON THE FRONT OF YOUR KNEE: ACTIVITY IS VERY DIFFICULT
STAND: ACTIVITY IS SOMEWHAT DIFFICULT

## 2024-06-05 NOTE — PROGRESS NOTES
PHYSICAL THERAPY EVALUATION  Type of Visit: Evaluation    See electronic medical record for Abuse and Falls Screening details.    Subjective       Presenting condition or subjective complaint:  R knee pain and low back pain starting around 2024  Date of onset:      Relevant medical history: Anemia; Depression; Migraines or headaches; Overweight; Pain at night or rest   Dates & types of surgery:    Back- discectomy , Bladder/urethral sling , Breast lumpectomy , tubal ligation ,    Prior diagnostic imaging/testing results:       Prior therapy history for the same diagnosis, illness or injury: Yes previous PT for knee  2017 and thigh pain around , previous PT around  for low back pain (after surgery)    Prior Level of Function  Transfers: Independent  Ambulation: Independent  ADL: Independent  IADL:  Independent    Living Environment  Social support: With family members (19 yo daughter)   Type of home: 2-story   Stairs to enter the home: Yes   Is there a railing: No   Ramp: No   Stairs inside the home: Yes 15 Is there a railing: Yes   Help at home:    Equipment owned:       Employment: Yes RN Case manager (prolonged sitting)  Hobbies/Interests: run, exercise, reading, sarthak, scrapbooks, dancing    Patient goals for therapy:  Run and walk without pain, weight lifting, getting up without pain    Pain assessment: Pain present  See objective evaluation for additional pain details     Objective   PAIN: Pain Level at Rest: 3/10 in low back and knee  Pain Level with Use: 3/10 in low back, 8/10 in R knee  Pain Location: R knee anterior and lateral, low back pain central, intermittent L lateral thigh pain/numbness  Pain Quality: Sharp, stabbing, or aching in R knee, aching in low back  Pain Frequency: intermittent  Pain is Worst: dependent on activity level and position vs time of day  Pain is Exacerbated By: Knee worse with Stairs (ascending and descending), squatting, kneeling,  twisting, walking, running; low back worse with bending, getting up after prolonged sitting  Pain is Relieved By: cold, NSAIDs, rest, and stretch  Pain Progression: Worsened  KNEE EVALUATION    POSTURE: Standing Posture: Sway back  Sitting Posture: WNL currently, notes that she does sit cross legged a lot  GAIT:  Weightbearing Status: WBAT  Assistive Device(s): None  Gait Deviations: Base of support increased  Stride length decreased    ROM:   (Degrees) Left AROM Left PROM  Right AROM Right PROM   Knee Flexion 150  150    Knee Extension Hyperext 5  Hyperext 5 +pain    Pain:   End feel:     STRENGTH: Knee Ext L 5/5, R 4/5,   FLEXIBILITY: Decreased quadriceps L, Decreased quadriceps R  SPECIAL TESTS:  (+) Drop test R, (+) Valgus worse at 30 R, (-) Varus B, (+) Apley's compression R, (-) Thessaly    PALPATION: Tenderness R pes anserine, R medial/inferior patella, (?) subpatellar bursa and fat pad R,     LUMBAR SPINE EVALUATION  ROM: Flex to toes, Ext 50%, SB L to knee ++pull R lateral hip, R to knee, Rotation WNL/EQ B  PELVIC/SI SCREEN:  ASIS level, medial malleoli level, (-) KEVIN B, (+) Active SLR R  STRENGTH: Hip flex 5/5 B, Abd 3+/5 B    MYOTOMES: WNL/EQ B when tested in sitting    NEURAL TENSION: (-) SLR B  FLEXIBILITY: Decreased piriformis L, Decreased piriformis R    Assessment & Plan   CLINICAL IMPRESSIONS  Medical Diagnosis: Low back pain and R Knee pain    Treatment Diagnosis: Low back pain and R Knee pain   Impression/Assessment: Patient is a 49 year old female with low back and R knee complaints.  The following significant findings have been identified: Pain, Decreased ROM/flexibility, Decreased joint mobility, Decreased strength, Impaired gait, Impaired muscle performance, Decreased activity tolerance, and Impaired posture. These impairments interfere with their ability to perform self care tasks, work tasks, recreational activities, household chores, household mobility, and community mobility as compared  to previous level of function.     Clinical Decision Making (Complexity):  Clinical Presentation: Evolving/Changing  Clinical Presentation Rationale: based on medical and personal factors listed in PT evaluation  Clinical Decision Making (Complexity): Moderate complexity    PLAN OF CARE  Treatment Interventions:  Modalities: E-stim, Hot Pack, Ultrasound  Interventions: Gait Training, Manual Therapy, Neuromuscular Re-education, Therapeutic Activity, Therapeutic Exercise, Self-Care/Home Management    Long Term Goals     PT Goal 1  Goal Identifier: Stairs  Goal Description: Patient will be able to ascend/descend stairs in normal reciprocal pattern w/ no worse than 2/10 pain  Rationale: to maximize safety and independence with performance of ADLs and functional tasks;to maximize safety and independence within the home;to maximize safety and independence within the community  Target Date: 08/28/24  PT Goal 2  Goal Identifier: Transfers  Goal Description: Patient will be able to get out of chair after sitting 2 hours w/ no worse than 2/10 pain  Rationale: to maximize safety and independence with performance of ADLs and functional tasks;to maximize safety and independence within the home  Target Date: 08/28/24      Frequency of Treatment: 1x/week  Duration of Treatment: x8 weeks, tapering to 1x/month x 1 month    Recommended Referrals to Other Professionals: none  Education Assessment:   Learner/Method: Patient;Listening;Reading;Demonstration;Pictures/Video;No Barriers to Learning    Risks and benefits of evaluation/treatment have been explained.   Patient/Family/caregiver agrees with Plan of Care.     Evaluation Time:     PT Eval, Moderate Complexity Minutes (84952): 26       Signing Clinician: Amanda Hilligoss, PT           Yes

## 2024-06-06 PROBLEM — M25.561 RIGHT KNEE PAIN: Status: ACTIVE | Noted: 2017-12-08

## 2024-06-12 ENCOUNTER — THERAPY VISIT (OUTPATIENT)
Dept: PHYSICAL THERAPY | Facility: CLINIC | Age: 49
End: 2024-06-12
Payer: COMMERCIAL

## 2024-06-12 DIAGNOSIS — M25.561 ACUTE PAIN OF RIGHT KNEE: ICD-10-CM

## 2024-06-12 DIAGNOSIS — G89.29 CHRONIC BILATERAL LOW BACK PAIN, UNSPECIFIED WHETHER SCIATICA PRESENT: Primary | ICD-10-CM

## 2024-06-12 DIAGNOSIS — M54.50 CHRONIC BILATERAL LOW BACK PAIN, UNSPECIFIED WHETHER SCIATICA PRESENT: Primary | ICD-10-CM

## 2024-06-12 PROCEDURE — 97110 THERAPEUTIC EXERCISES: CPT | Mod: GP | Performed by: PHYSICAL THERAPIST

## 2024-06-12 PROCEDURE — 97530 THERAPEUTIC ACTIVITIES: CPT | Mod: GP | Performed by: PHYSICAL THERAPIST

## 2024-06-26 ENCOUNTER — THERAPY VISIT (OUTPATIENT)
Dept: PHYSICAL THERAPY | Facility: CLINIC | Age: 49
End: 2024-06-26
Payer: COMMERCIAL

## 2024-06-26 DIAGNOSIS — M25.561 ACUTE PAIN OF RIGHT KNEE: ICD-10-CM

## 2024-06-26 DIAGNOSIS — G89.29 CHRONIC BILATERAL LOW BACK PAIN, UNSPECIFIED WHETHER SCIATICA PRESENT: Primary | ICD-10-CM

## 2024-06-26 DIAGNOSIS — M54.50 CHRONIC BILATERAL LOW BACK PAIN, UNSPECIFIED WHETHER SCIATICA PRESENT: Primary | ICD-10-CM

## 2024-06-26 PROCEDURE — 97112 NEUROMUSCULAR REEDUCATION: CPT | Mod: GP | Performed by: PHYSICAL THERAPIST

## 2024-06-26 PROCEDURE — 97110 THERAPEUTIC EXERCISES: CPT | Mod: GP | Performed by: PHYSICAL THERAPIST

## 2024-06-26 PROCEDURE — 97530 THERAPEUTIC ACTIVITIES: CPT | Mod: GP | Performed by: PHYSICAL THERAPIST

## 2024-07-03 ENCOUNTER — THERAPY VISIT (OUTPATIENT)
Dept: PHYSICAL THERAPY | Facility: CLINIC | Age: 49
End: 2024-07-03
Payer: COMMERCIAL

## 2024-07-03 DIAGNOSIS — G89.29 CHRONIC BILATERAL LOW BACK PAIN, UNSPECIFIED WHETHER SCIATICA PRESENT: Primary | ICD-10-CM

## 2024-07-03 DIAGNOSIS — M54.50 CHRONIC BILATERAL LOW BACK PAIN, UNSPECIFIED WHETHER SCIATICA PRESENT: Primary | ICD-10-CM

## 2024-07-03 DIAGNOSIS — M25.561 ACUTE PAIN OF RIGHT KNEE: ICD-10-CM

## 2024-07-03 PROCEDURE — 97112 NEUROMUSCULAR REEDUCATION: CPT | Mod: GP | Performed by: PHYSICAL THERAPIST

## 2024-07-03 PROCEDURE — 97140 MANUAL THERAPY 1/> REGIONS: CPT | Mod: GP | Performed by: PHYSICAL THERAPIST

## 2024-07-03 PROCEDURE — 97110 THERAPEUTIC EXERCISES: CPT | Mod: GP | Performed by: PHYSICAL THERAPIST

## 2024-07-11 ENCOUNTER — THERAPY VISIT (OUTPATIENT)
Dept: PHYSICAL THERAPY | Facility: CLINIC | Age: 49
End: 2024-07-11
Payer: COMMERCIAL

## 2024-07-11 DIAGNOSIS — G89.29 CHRONIC BILATERAL LOW BACK PAIN, UNSPECIFIED WHETHER SCIATICA PRESENT: Primary | ICD-10-CM

## 2024-07-11 DIAGNOSIS — M54.50 CHRONIC BILATERAL LOW BACK PAIN, UNSPECIFIED WHETHER SCIATICA PRESENT: Primary | ICD-10-CM

## 2024-07-11 DIAGNOSIS — M25.561 ACUTE PAIN OF RIGHT KNEE: ICD-10-CM

## 2024-07-11 PROCEDURE — 97140 MANUAL THERAPY 1/> REGIONS: CPT | Mod: GP | Performed by: PHYSICAL THERAPIST

## 2024-07-11 PROCEDURE — 97112 NEUROMUSCULAR REEDUCATION: CPT | Mod: GP | Performed by: PHYSICAL THERAPIST

## 2024-07-11 PROCEDURE — 97110 THERAPEUTIC EXERCISES: CPT | Mod: GP | Performed by: PHYSICAL THERAPIST

## 2024-07-24 ENCOUNTER — THERAPY VISIT (OUTPATIENT)
Dept: PHYSICAL THERAPY | Facility: CLINIC | Age: 49
End: 2024-07-24
Payer: COMMERCIAL

## 2024-07-24 DIAGNOSIS — M25.561 ACUTE PAIN OF RIGHT KNEE: ICD-10-CM

## 2024-07-24 DIAGNOSIS — M54.50 CHRONIC BILATERAL LOW BACK PAIN, UNSPECIFIED WHETHER SCIATICA PRESENT: Primary | ICD-10-CM

## 2024-07-24 DIAGNOSIS — G89.29 CHRONIC BILATERAL LOW BACK PAIN, UNSPECIFIED WHETHER SCIATICA PRESENT: Primary | ICD-10-CM

## 2024-07-24 PROCEDURE — 97112 NEUROMUSCULAR REEDUCATION: CPT | Mod: GP | Performed by: PHYSICAL THERAPIST

## 2024-07-24 PROCEDURE — 97140 MANUAL THERAPY 1/> REGIONS: CPT | Mod: GP | Performed by: PHYSICAL THERAPIST

## 2024-07-24 PROCEDURE — 97110 THERAPEUTIC EXERCISES: CPT | Mod: GP | Performed by: PHYSICAL THERAPIST

## 2024-08-12 ENCOUNTER — THERAPY VISIT (OUTPATIENT)
Dept: PHYSICAL THERAPY | Facility: CLINIC | Age: 49
End: 2024-08-12
Payer: COMMERCIAL

## 2024-08-12 DIAGNOSIS — G89.29 CHRONIC BILATERAL LOW BACK PAIN, UNSPECIFIED WHETHER SCIATICA PRESENT: Primary | ICD-10-CM

## 2024-08-12 DIAGNOSIS — M54.50 CHRONIC BILATERAL LOW BACK PAIN, UNSPECIFIED WHETHER SCIATICA PRESENT: Primary | ICD-10-CM

## 2024-08-12 DIAGNOSIS — M25.561 ACUTE PAIN OF RIGHT KNEE: ICD-10-CM

## 2024-08-12 PROCEDURE — 97110 THERAPEUTIC EXERCISES: CPT | Mod: GP | Performed by: PHYSICAL THERAPIST

## 2024-08-12 PROCEDURE — 97112 NEUROMUSCULAR REEDUCATION: CPT | Mod: GP | Performed by: PHYSICAL THERAPIST

## 2024-08-12 PROCEDURE — 97140 MANUAL THERAPY 1/> REGIONS: CPT | Mod: GP | Performed by: PHYSICAL THERAPIST

## 2024-08-12 NOTE — PROGRESS NOTES
PLAN  Continue therapy per current plan of care.  2x/month x 1-2 months (will see orthopedic specialist if needing PT past 9/4/24)    Beginning/End Dates of Progress Note Reporting Period:  06/05/24 to 08/12/2024    Referring Provider:  Referred Self       08/12/24 0500   Appointment Info   Signing clinician's name / credentials Amanda Hilligoss, DPT, PRPC   Total/Authorized Visits 10 (Per PT eval)   Visits Used 7   Medical Diagnosis Low back pain and R Knee pain   PT Tx Diagnosis Low back pain and R Knee pain   Quick Adds Self Referred   Self Referred   Self Referred (PT) Yes   MD order needed by: 9/3/2024   Progress Note/Certification   Start of Care Date 06/05/24   Therapy Frequency 1x/week   Predicted Duration x8 weeks, tapering to 1x/month x 1 month   Progress Note Due Date 08/03/24   Progress Note Completed Date 06/05/24   GOALS   PT Goals 2;3   PT Goal 1   Goal Identifier Stairs   Goal Description Patient will be able to ascend/descend stairs in normal reciprocal pattern w/ no worse than 2/10 pain   Rationale to maximize safety and independence with performance of ADLs and functional tasks;to maximize safety and independence within the home;to maximize safety and independence within the community   Goal Progress pain to 2-3/10 when ascending/descending stairs (had 1-2days after running where pain was up to 8)   Target Date 08/28/24   PT Goal 2   Goal Identifier Transfers   Goal Description Patient will be able to get out of chair after sitting 2 hours w/ no worse than 2/10 pain   Rationale to maximize safety and independence with performance of ADLs and functional tasks;to maximize safety and independence within the home   Goal Progress 2/10 pain when getting up after sitting for 2 hours   Target Date 08/28/24   Date Met 06/26/24   PT Goal 3   Goal Identifier Sitting   Goal Description Patient will be able to sit as needed for full work shift w/ pain no worse than 2/10   Rationale to maximize safety and  independence with performance of ADLs and functional tasks;to maximize safety and independence with self cares  (to complete work duties and to be able to rest from standing)   Goal Progress 2/10 pain when getting up after sitting for 2 hours   Target Date 08/28/24   Subjective Report   Subjective Report Patient has complete 7 PT visits for this episode of are. She feels that overall knee and low back pain have been less compared to prior to PT. She tried running 3 times since last visit. Initial run was painful during, 2nd run mild/tolerable pain during or after, 3rd run had significant pain increase in R knee for about 2 days after as well as moderate pain increase in low back. Feel she can sit for about 2 hours at work w/ pain in low back and knee about 2/10 when getting up (except for after running, when pain got to 8/10).   Objective Measures   Objective Measures Objective Measure 1;Objective Measure 2;Objective Measure 3;Objective Measure 4;Objective Measure 5   Objective Measure 1   Objective Measure SI Screen   Details ASIS level, medial malleoli level, (-) Active SLR, (-) KEVIN B   Objective Measure 2   Objective Measure SLR   Details as of 7/11/24: L 20/20, R 20/20   Objective Measure 3   Objective Measure Single leg bridge   Details L 20/20 (mild pain in low back during, improved w/ cue to activate glute more), R 20/20   Objective Measure 4   Objective Measure Palpation   Details hypomobile lumbar fascia   Objective Measure 5   Objective Measure Muscle endurance   Details Quad endurance (wall sit):1:38 /3:00 at 65 deg knee flex; Lumbar endurance from 7/24/24: 1:19/3:00   Treatment Interventions (PT)   Interventions Therapeutic Procedure/Exercise;Neuromuscular Re-education;Manual Therapy   Therapeutic Procedure/Exercise   Therapeutic Procedures: strength, endurance, ROM, flexibility minutes (53830) 12   Therapeutic Procedures Ther Proc 2;Ther Proc 3;Ther Proc 4;Ther Proc 5;Ther Proc 6   Ther Proc 1 Gluteal  Myofascial Arc Series   Ther Proc 1 - Details continue for HEP (up to 15 of ea.)   Ther Proc 2 Single lg bridge   Ther Proc 2 - Details x20 B   Ther Proc 3 Prone quad stretch   Ther Proc 3 - Details HEP   Ther Proc 4 Wall sit   Ther Proc 4 - Details 1:38   Skilled Intervention selection of appropriate exercises based on objective findings and patient response   Neuromuscular Re-education   Neuromuscular re-ed of mvmt, balance, coord, kinesthetic sense, posture, proprioception minutes (67130) 14   Neuromuscular Re-education Neuro Re-ed 4   Neuro Re-ed 2 Donkey kick   Neuro Re-ed 2 - Details x20 B (manual cuing to decrease lumbar ext and increase glute activation)   Neuro Re-ed 3 TA activation   Neuro Re-ed 3 - Details manual and verbal cuing for proper TA activation; abdominal # 6 x 10 unilateral repeated w/ PT's hand behind pt's low back for maintaining pressure throughout; exhale when reaching away to imrpvoe lower abdominal activation and make sure no breath holding   Neuro Re-ed 4 Forward lungres   Neuro Re-ed 4 - Details x8 (stopped due to pain in R knee, better if keeping knee over ankle, but still painful)   Skilled Intervention see above for cuing   Patient Response/Progress improved TA activation w/ cuing   Manual Therapy   Manual Therapy: Mobilization, MFR, MLD, friction massage minutes (93992) 12   Manual Therapy 1 STM   Manual Therapy 1 - Details Quad fascial rolling x12 min w/ hypomobility and crepitus, improves throughout   Skilled Intervention adjustment of technique, intensity, and location based on patient tolerance and tissue response   Patient Response/Progress improved mobility in lumbar fascia by end   Education   Learner/Method Patient;Listening;Reading;Demonstration;Pictures/Video;No Barriers to Learning   Plan   Home program PTRx   Plan for next session continue work on quad strength and glute activation   Total Session Time   Timed Code Treatment Minutes 38   Total Treatment Time (sum of  timed and untimed services) 38

## 2024-08-28 ENCOUNTER — THERAPY VISIT (OUTPATIENT)
Dept: PHYSICAL THERAPY | Facility: CLINIC | Age: 49
End: 2024-08-28
Payer: COMMERCIAL

## 2024-08-28 DIAGNOSIS — M25.561 ACUTE PAIN OF RIGHT KNEE: ICD-10-CM

## 2024-08-28 DIAGNOSIS — M54.50 CHRONIC BILATERAL LOW BACK PAIN, UNSPECIFIED WHETHER SCIATICA PRESENT: Primary | ICD-10-CM

## 2024-08-28 DIAGNOSIS — G89.29 CHRONIC BILATERAL LOW BACK PAIN, UNSPECIFIED WHETHER SCIATICA PRESENT: Primary | ICD-10-CM

## 2024-08-28 PROCEDURE — 97139 UNLISTED THERAPEUTIC PX: CPT | Mod: GP | Performed by: PHYSICAL THERAPIST

## 2024-08-28 PROCEDURE — 97112 NEUROMUSCULAR REEDUCATION: CPT | Mod: GP | Performed by: PHYSICAL THERAPIST

## 2024-08-28 PROCEDURE — 97140 MANUAL THERAPY 1/> REGIONS: CPT | Mod: GP | Performed by: PHYSICAL THERAPIST

## 2024-09-26 ENCOUNTER — OFFICE VISIT (OUTPATIENT)
Dept: ORTHOPEDICS | Facility: CLINIC | Age: 49
End: 2024-09-26
Payer: COMMERCIAL

## 2024-09-26 ENCOUNTER — ANCILLARY PROCEDURE (OUTPATIENT)
Dept: GENERAL RADIOLOGY | Facility: CLINIC | Age: 49
End: 2024-09-26
Attending: FAMILY MEDICINE
Payer: COMMERCIAL

## 2024-09-26 DIAGNOSIS — M25.561 CHRONIC PAIN OF RIGHT KNEE: Primary | ICD-10-CM

## 2024-09-26 DIAGNOSIS — G89.29 CHRONIC PAIN OF RIGHT KNEE: Primary | ICD-10-CM

## 2024-09-26 DIAGNOSIS — M25.561 RIGHT KNEE PAIN: ICD-10-CM

## 2024-09-26 PROCEDURE — 99203 OFFICE O/P NEW LOW 30 MIN: CPT | Mod: 25 | Performed by: FAMILY MEDICINE

## 2024-09-26 PROCEDURE — 73562 X-RAY EXAM OF KNEE 3: CPT | Mod: RT | Performed by: RADIOLOGY

## 2024-09-26 PROCEDURE — 20610 DRAIN/INJ JOINT/BURSA W/O US: CPT | Mod: RT | Performed by: FAMILY MEDICINE

## 2024-09-26 RX ORDER — ATOMOXETINE 25 MG/1
25 CAPSULE ORAL 2 TIMES DAILY
COMMUNITY
Start: 2024-01-15

## 2024-09-26 RX ORDER — SEMAGLUTIDE 0.68 MG/ML
0.5 INJECTION, SOLUTION SUBCUTANEOUS
COMMUNITY

## 2024-09-26 RX ORDER — DEXTROAMPHETAMINE SACCHARATE, AMPHETAMINE ASPARTATE, DEXTROAMPHETAMINE SULFATE AND AMPHETAMINE SULFATE 2.5; 2.5; 2.5; 2.5 MG/1; MG/1; MG/1; MG/1
20 TABLET ORAL DAILY
COMMUNITY
Start: 2024-08-25

## 2024-09-26 RX ADMIN — TRIAMCINOLONE ACETONIDE 40 MG: 40 INJECTION, SUSPENSION INTRA-ARTICULAR; INTRAMUSCULAR at 16:21

## 2024-09-26 ASSESSMENT — PAIN SCALES - GENERAL: PAINLEVEL: SEVERE PAIN (7)

## 2024-09-26 NOTE — LETTER
9/26/2024      Sun Andrade  [address] [facility] [address]      Dear Colleague,    Thank you for referring your patient, Sun Andrade, to the [facility] [place]. Please see a copy of my visit note below.    CHIEF COMPLAINT:  Pain and New Patient of the Right Knee (Pain for 5 months)       HISTORY OF PRESENT ILLNESS  Ms. Andrade is a 49 year old female who presents to clinic today with right knee pain.  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  She did not have any inciting injury or event that she can recall.  She has been going to physical therapy pretty diligently for the past 5 months or so, she did get some intermittent relief, although her symptoms are ongoing.  She points to the medial and anterior aspect of her knee, this is worse going up or down steps, particularly down steps.        Additional history: as documented    MEDICAL HISTORY  Patient Active Problem List   Diagnosis     Attention deficit disorder of adult     Enlargement - tonsil/adenoid     Anxiety     Low back pain     Lumbar radiculopathy     Sciatica     Nonallopathic lesion of lumbar region     CARDIOVASCULAR SCREENING; LDL GOAL LESS THAN 160     Major depression in partial remission (H24)     Insomnia     Acute pain of right knee     Allergic rhinitis due to dust mite     Seasonal allergic rhinitis due to pollen     Allergic drug rash due to multiple agents       Current Outpatient Medications   Medication Sig Dispense Refill     amphetamine-dextroamphetamine (ADDERALL) 10 MG tablet Take 20 mg by mouth daily.       atomoxetine (STRATTERA) 25 MG capsule Take 25 mg by mouth 2 times daily.       azelastine (ASTELIN) 0.1 % nasal spray Spray 2 sprays into both nostrils 2 times daily 30 mL 3     buPROPion (WELLBUTRIN SR) 150 MG 12 hr tablet Take 450 mg by mouth once.       cetirizine (ZYRTEC) 10 MG tablet Take 10 mg by mouth daily       montelukast  (SINGULAIR) 10 MG tablet Take 1 tablet (10 mg) by mouth At Bedtime 30 tablet 5     naproxen sodium (ANAPROX) 220 MG tablet Take 220 mg by mouth 2 times daily (with meals)       OZEMPIC, 0.25 OR 0.5 MG/DOSE, 2 MG/3ML pen Inject 0.5 mg subcutaneously every 7 days.       Ascorbic Acid (VITAMIN C) 100 MG CHEW  (Patient not taking: Reported on 9/26/2024)       calcium-vitamin D (OSCAL) 250-125 MG-UNIT TABS per tablet Take 1 tablet by mouth 2 times daily (Patient not taking: Reported on 9/26/2024)       Ferrous Sulfate (IRON) 325 (65 Fe) MG tablet Take 325 mg by mouth (Patient not taking: Reported on 9/26/2024)       fish oil-omega-3 fatty acids 1000 MG capsule Take 1,000 mg by mouth (Patient not taking: Reported on 9/26/2024)       Multiple Vitamin (MULTI-VITAMIN DAILY PO) Take 1 tablet by mouth (Patient not taking: Reported on 9/26/2024)       trimethoprim-polymyxin b (POLYTRIM) ophthalmic solution Place 1 drop into both eyes 4 times daily. (Patient not taking: Reported on 9/26/2024) 1 Bottle 0       Allergies   Allergen Reactions     Ativan Itching     Cephalosporins      itching     Dilaudid [Hydromorphone Hcl]      Itching     Hyoscyamine      itching     Metformin Swelling     Other Reaction(s): Not available     Oxycodone      Itching     Pcn [Penicillins]      itching     Sulfa Antibiotics      Rash and itching     Ultram [Tramadol]      itching     Vicodin [Acetaminophen]      itching       Family History   Problem Relation Age of Onset     Depression Mother      Breast Cancer Maternal Grandmother      Depression Maternal Grandmother      Diabetes Maternal Grandfather      C.A.D. Paternal Grandfather         CHF     Diabetes Paternal Grandmother      Asthma Daughter        Additional medical/Social/Surgical histories reviewed in [facility] and updated as appropriate.        PHYSICAL EXAM  General  - normal appearance, in no obvious distress  Musculoskeletal - right knee  - stance: normal gait without limp  -  inspection: no swelling or effusion  - palpation: no joint line tenderness, patellar tendon non-tender, tender medial patellar facet  - ROM: 135 degrees flexion, -5 degrees extension, not painful  - strength: 5/5 in flexion, 5/5 in extension  - special tests:  (-) Lachman  (-) anterior drawer  (-) Sury  (-) varus at 0 and 30 degrees flexion  (-) valgus at 0 and 30 degrees flexion  - no sensory or motor deficit, grossly normal coordination, normal muscle tone             ASSESSMENT & PLAN  Ms. Andrade is a 49 year old female who presents to clinic today with right knee pain.    I ordered and independently reviewed an xray of her right knee, this reveals mild osteoarthritis.    Her symptoms do seem consistent with patellofemoral syndrome.    We discussed pathophysiology and treatment strategies for patellofemoral syndrome.  Through shared decision making we did decide to inject her knee today with corticosteroid.  Hopefully this can be diagnostic and therapeutic.    If her symptoms are not improving whatsoever over the course of the coming week or so she should get in touch with us, if this is the case I may consider either advanced imaging or possibly working her up for an issue external to the joint itself.    It was a pleasure seeing Sun today.    Greater than 20 minutes were spent on the day of visit reviewing records, in direct face-to-face consultation and exam, and documentation independent of the procedure.       Markus Coombs DO, [facility]  Primary Care Sports Medicine      This note was constructed using Dragon dictation software, please excuse any minor errors in spelling, grammar, or syntax.    Large Joint Injection/Arthocentesis: R knee joint    Date/Time: 9/26/2024 4:21 PM    Performed by: Markus Coombs DO  Authorized by: Markus Coombs DO    Indications:  Pain  Needle Size:  22 G  Guidance: landmark guided    Approach:  Lateral  Location:  Knee      Medications:  40 mg triamcinolone 40  MG/ML  Outcome:  Tolerated well, no immediate complications  Procedure discussed: discussed risks, benefits, and alternatives    Consent Given by:  Patient  Timeout: timeout called immediately prior to procedure    Prep: patient was prepped and draped in usual sterile fashion       PROCEDURE    Knee Injection - Intraarticular  The patient was informed of the risks and the benefits of the procedure and a written consent was signed.  The patient's right knee was prepped with chlorhexidine in sterile fashion.   40 mg of triamcinolone suspension was drawn up into a 5 mL syringe with 4 mL of 1% lidocaine.  Injection was performed using substerile technique.  A 1.5-inch 22-gauge needle was used to enter the lateral aspect of the knee.  Injection performed successfully without difficulty.  There were no complications. The patient tolerated the procedure well. There was negligible bleeding.                 Again, thank you for allowing me to participate in the care of your patient.        Sincerely,        Markus Coombs DO

## 2024-09-26 NOTE — NURSING NOTE
Chief Complaint   Patient presents with    Right Knee - Pain, New Patient     Pain for 5 months       There were no vitals filed for this visit.    There is no height or weight on file to calculate BMI.      ERNESTO Magana NREMT                       no Yes - the patient is able to be screened

## 2024-09-26 NOTE — NURSING NOTE
[facility]   ORTHOPEDICS & SPORTS MEDICINE  [address] [place] [address]  Dept: [phone]  ______________________________________________________________________________    Patient: Sun Andrade   : 1975   MRN: [id number]   2024    INVASIVE PROCEDURE SAFETY CHECKLIST    Date:  2024   Procedure: Right knee injection  Patient Name: Sun Andrade  MRN: [id number]  YOB: 1975    Action: Complete sections as appropriate. Any discrepancy results in a HARD COPY until resolved.     PRE PROCEDURE:  Patient ID verified with 2 identifiers (name and  or MRN): Yes  Procedure and site verified with patient/designee (when able): Yes  Accurate consent documentation in medical record: Yes  H&P (or appropriate assessment) documented in medical record: Yes  H&P must be up to 20 days prior to procedure and updates within 24 hours of procedure as applicable: NA  Relevant diagnostic and radiology test results appropriately labeled and displayed as applicable: NA  Procedure site(s) marked with provider initials: NA    TIMEOUT:  Time-Out performed immediately prior to starting procedure, including verbal and active participation of all team members addressing the following:Yes  * Correct patient identify  * Confirmed that the correct side and site are marked  * An accurate procedure consent form  * Agreement on the procedure to be done  * Correct patient position  * Relevant images and results are properly labeled and appropriately displayed  * The need to administer antibiotics or fluids for irrigation purposes during the procedure as applicable   * Safety precautions based on patient history or medication use    DURING PROCEDURE: Verification of correct person, site, and procedures any time the responsibility for care of the patient is transferred to another member of the care team.       Prior to injection, verified patient identity using patient's name and date  of birth.  Due to injection administration, patient instructed to remain in clinic for 15 minutes  afterwards, and to report any adverse reaction to me immediately.    Joint injection was performed.      Drug Amount Wasted:  None.  Vial/Syringe: Single dose vial  Expiration Date:  6/30/2026      Chino Zelaya, EMT  September 26, 2024

## 2024-09-26 NOTE — PROGRESS NOTES
CHIEF COMPLAINT:  Pain and New Patient of the Right Knee (Pain for 5 months)       HISTORY OF PRESENT ILLNESS  Ms. Andrade is a 49 year old female who presents to clinic today with right knee pain.  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  She did not have any inciting injury or event that she can recall.  She has been going to physical therapy pretty diligently for the past 5 months or so, she did get some intermittent relief, although her symptoms are ongoing.  She points to the medial and anterior aspect of her knee, this is worse going up or down steps, particularly down steps.        Additional history: as documented    MEDICAL HISTORY  Patient Active Problem List   Diagnosis    Attention deficit disorder of adult    Enlargement - tonsil/adenoid    Anxiety    Low back pain    Lumbar radiculopathy    Sciatica    Nonallopathic lesion of lumbar region    CARDIOVASCULAR SCREENING; LDL GOAL LESS THAN 160    Major depression in partial remission (H24)    Insomnia    Acute pain of right knee    Allergic rhinitis due to dust mite    Seasonal allergic rhinitis due to pollen    Allergic drug rash due to multiple agents       Current Outpatient Medications   Medication Sig Dispense Refill    amphetamine-dextroamphetamine (ADDERALL) 10 MG tablet Take 20 mg by mouth daily.      atomoxetine (STRATTERA) 25 MG capsule Take 25 mg by mouth 2 times daily.      azelastine (ASTELIN) 0.1 % nasal spray Spray 2 sprays into both nostrils 2 times daily 30 mL 3    buPROPion (WELLBUTRIN SR) 150 MG 12 hr tablet Take 450 mg by mouth once.      cetirizine (ZYRTEC) 10 MG tablet Take 10 mg by mouth daily      montelukast (SINGULAIR) 10 MG tablet Take 1 tablet (10 mg) by mouth At Bedtime 30 tablet 5    naproxen sodium (ANAPROX) 220 MG tablet Take 220 mg by mouth 2 times daily (with meals)      OZEMPIC, 0.25 OR 0.5 MG/DOSE, 2 MG/3ML pen Inject 0.5 mg subcutaneously every 7 days.      Ascorbic  Acid (VITAMIN C) 100 MG CHEW  (Patient not taking: Reported on 9/26/2024)      calcium-vitamin D (OSCAL) 250-125 MG-UNIT TABS per tablet Take 1 tablet by mouth 2 times daily (Patient not taking: Reported on 9/26/2024)      Ferrous Sulfate (IRON) 325 (65 Fe) MG tablet Take 325 mg by mouth (Patient not taking: Reported on 9/26/2024)      fish oil-omega-3 fatty acids 1000 MG capsule Take 1,000 mg by mouth (Patient not taking: Reported on 9/26/2024)      Multiple Vitamin (MULTI-VITAMIN DAILY PO) Take 1 tablet by mouth (Patient not taking: Reported on 9/26/2024)      trimethoprim-polymyxin b (POLYTRIM) ophthalmic solution Place 1 drop into both eyes 4 times daily. (Patient not taking: Reported on 9/26/2024) 1 Bottle 0       Allergies   Allergen Reactions    Ativan Itching    Cephalosporins      itching    Dilaudid [Hydromorphone Hcl]      Itching    Hyoscyamine      itching    Metformin Swelling     Other Reaction(s): Not available    Oxycodone      Itching    Pcn [Penicillins]      itching    Sulfa Antibiotics      Rash and itching    Ultram [Tramadol]      itching    Vicodin [Acetaminophen]      itching       Family History   Problem Relation Age of Onset    Depression Mother     Breast Cancer Maternal Grandmother     Depression Maternal Grandmother     Diabetes Maternal Grandfather     C.A.D. Paternal Grandfather         CHF    Diabetes Paternal Grandmother     Asthma Daughter        Additional medical/Social/Surgical histories reviewed in [facility] and updated as appropriate.        PHYSICAL EXAM  General  - normal appearance, in no obvious distress  Musculoskeletal - right knee  - stance: normal gait without limp  - inspection: no swelling or effusion  - palpation: no joint line tenderness, patellar tendon non-tender, tender medial patellar facet  - ROM: 135 degrees flexion, -5 degrees extension, not painful  - strength: 5/5 in flexion, 5/5 in extension  - special tests:  (-) Lachman  (-) anterior drawer  (-)  Sury  (-) varus at 0 and 30 degrees flexion  (-) valgus at 0 and 30 degrees flexion  - no sensory or motor deficit, grossly normal coordination, normal muscle tone             ASSESSMENT & PLAN  Ms. Andrade is a 49 year old female who presents to clinic today with right knee pain.    I ordered and independently reviewed an xray of her right knee, this reveals mild osteoarthritis.    Her symptoms do seem consistent with patellofemoral syndrome.    We discussed pathophysiology and treatment strategies for patellofemoral syndrome.  Through shared decision making we did decide to inject her knee today with corticosteroid.  Hopefully this can be diagnostic and therapeutic.    If her symptoms are not improving whatsoever over the course of the coming week or so she should get in touch with us, if this is the case I may consider either advanced imaging or possibly working her up for an issue external to the joint itself.    It was a pleasure seeing Sun today.    Greater than 20 minutes were spent on the day of visit reviewing records, in direct face-to-face consultation and exam, and documentation independent of the procedure.       Markus Coombs DO, [facility]  Primary Care Sports Medicine      This note was constructed using Dragon dictation software, please excuse any minor errors in spelling, grammar, or syntax.    Large Joint Injection/Arthocentesis: R knee joint    Date/Time: 9/26/2024 4:21 PM    Performed by: Markus Coombs DO  Authorized by: Markus Coombs DO    Indications:  Pain  Needle Size:  22 G  Guidance: landmark guided    Approach:  Lateral  Location:  Knee      Medications:  40 mg triamcinolone 40 MG/ML  Outcome:  Tolerated well, no immediate complications  Procedure discussed: discussed risks, benefits, and alternatives    Consent Given by:  Patient  Timeout: timeout called immediately prior to procedure    Prep: patient was prepped and draped in usual sterile fashion       PROCEDURE    Knee  Injection - Intraarticular  The patient was informed of the risks and the benefits of the procedure and a written consent was signed.  The patient's right knee was prepped with chlorhexidine in sterile fashion.   40 mg of triamcinolone suspension was drawn up into a 5 mL syringe with 4 mL of 1% lidocaine.  Injection was performed using substerile technique.  A 1.5-inch 22-gauge needle was used to enter the lateral aspect of the knee.  Injection performed successfully without difficulty.  There were no complications. The patient tolerated the procedure well. There was negligible bleeding.

## 2024-09-30 ENCOUNTER — THERAPY VISIT (OUTPATIENT)
Dept: PHYSICAL THERAPY | Facility: CLINIC | Age: 49
End: 2024-09-30
Payer: COMMERCIAL

## 2024-09-30 DIAGNOSIS — M54.50 CHRONIC BILATERAL LOW BACK PAIN, UNSPECIFIED WHETHER SCIATICA PRESENT: Primary | ICD-10-CM

## 2024-09-30 DIAGNOSIS — G89.29 CHRONIC BILATERAL LOW BACK PAIN, UNSPECIFIED WHETHER SCIATICA PRESENT: Primary | ICD-10-CM

## 2024-09-30 DIAGNOSIS — M25.561 ACUTE PAIN OF RIGHT KNEE: ICD-10-CM

## 2024-09-30 PROCEDURE — 97140 MANUAL THERAPY 1/> REGIONS: CPT | Mod: GP | Performed by: PHYSICAL THERAPIST

## 2024-09-30 PROCEDURE — 97110 THERAPEUTIC EXERCISES: CPT | Mod: GP | Performed by: PHYSICAL THERAPIST

## 2024-09-30 RX ORDER — TRIAMCINOLONE ACETONIDE 40 MG/ML
40 INJECTION, SUSPENSION INTRA-ARTICULAR; INTRAMUSCULAR
Status: SHIPPED | OUTPATIENT
Start: 2024-09-26

## 2024-09-30 NOTE — PROGRESS NOTES
09/30/24 0500   Appointment Info   Signing clinician's name / credentials Amanda Hilligoss, TIM, PRPC   Total/Authorized Visits 15 (see PN 9/30/24)   Visits Used 9   Medical Diagnosis Low back pain and R Knee pain   PT Tx Diagnosis Low back pain and R Knee pain   Progress Note/Certification   Start of Care Date 06/05/24   Therapy Frequency 2x/month   Predicted Duration x3 months   GOALS   PT Goals 2;3;4   PT Goal 1   Goal Identifier Stairs   Goal Description Patient will be able to ascend/descend stairs in normal reciprocal pattern w/ no worse than 2/10 pain   Rationale to maximize safety and independence with performance of ADLs and functional tasks;to maximize safety and independence within the home;to maximize safety and independence within the community   Goal Progress pain to 1-2/10 when ascending/descending stairs   Target Date 10/23/24   Date Met 09/30/24   PT Goal 2   Goal Identifier Transfers   Goal Description Patient will be able to get out of chair after sitting 2 hours w/ no worse than 2/10 pain   Rationale to maximize safety and independence with performance of ADLs and functional tasks;to maximize safety and independence within the home   Goal Progress 2/10 pain when getting up after sitting for 2 hours   Target Date 08/28/24   Date Met 06/26/24   PT Goal 3   Goal Identifier Sitting   Goal Description Patient will be able to sit as needed for full work shift w/ pain no worse than 2/10   Rationale to maximize safety and independence with performance of ADLs and functional tasks;to maximize safety and independence with self cares  (to complete work duties and to be able to rest from standing)   Goal Progress 2-3/10 pain when getting up after sitting for 2 hours   Target Date 10/23/24   PT Goal 4   Goal Identifier Walking   Goal Description Patient will be able to walk 2 miles w/o pain increase   Rationale to maximize safety and independence within the home;to maximize safety and independence within  "the community   Goal Progress 1 mile w/ pain 5/10 pain   Target Date 11/25/24   Subjective Report   Subjective Report Patient has not been to therapy since 8/28/24. Saw orthopedist and had steroid injection to R knee 9/26/24. Has not been testing distance she can walk and has not run yet, so unsure if there has been an improvement. Does note that when she walked 9/23/24, she was only able to go a mile and pain was 5-6/10. Notes she has not been doing her exercises over past couple weeks as she was on vacation walking all day for 6 days, so took \"a week off to recover\".   Objective Measures   Objective Measures Objective Measure 1;Objective Measure 2;Objective Measure 3;Objective Measure 4;Objective Measure 5   Objective Measure 1   Objective Measure SI Screen   Details ASIS high on L, medial malleoli high on L, (-) Active SLR, (+) KEVIN L   Objective Measure 2   Objective Measure SLR   Details L 11/20, R 12/20   Objective Measure 3   Objective Measure Single leg bridge   Details L 11/20; R 15/20   Objective Measure 4   Objective Measure Knee AROM   Details WNL/EQ B   Objective Measure 5   Objective Measure Muscle endurance   Details Quad endurance (wall sit) 0:55/3:00 at 60 deg knee flex   Treatment Interventions (PT)   Interventions Therapeutic Procedure/Exercise;Neuromuscular Re-education;Manual Therapy   Therapeutic Procedure/Exercise   Therapeutic Procedures: strength, endurance, ROM, flexibility minutes (17166) 24   Therapeutic Procedures Ther Proc 2;Ther Proc 3;Ther Proc 4;Ther Proc 5;Ther Proc 6   Ther Proc 1 Gluteal Myofascial Arc Series   Ther Proc 1 - Details continue for HEP (up to 15 of ea.)   Ther Proc 2 Single lg bridge   Ther Proc 2 - Details x15 B (w/ brief rest for last few)   Ther Proc 3 Prone quad stretch   Ther Proc 3 - Details HEP   Ther Proc 4 Wall sit   Ther Proc 4 - Details 55 sec (stopped due to pain); 2nd set (after MT); 1:44 (stopped due to fatigue)   Ther Proc 5 SLR   Ther Proc 5 - " Details L 11/20, R 12/20   Ther Proc 6 Additional time spent on return to exercise program   Skilled Intervention visual, verbal, and manual cuing   Patient Response/Progress more fatigue today than last time endurance exercises were performed in clinic   Neuromuscular Re-education   Neuromuscular Re-education Neuro Re-ed 4   Neuro Re-ed 2 Donkey kick   Neuro Re-ed 2 - Details HEP   Neuro Re-ed 3 TA activation   Neuro Re-ed 3 - Details HEP   Manual Therapy   Manual Therapy: Mobilization, MFR, MLD, friction massage minutes (57011) 16   Manual Therapy 1 STM   Manual Therapy 1 - Details Quad fascial rolling x8 min w/ hypomobility and crepitus, improves throughout   Skilled Intervention adjustment of technique, intensity, and location based on patient tolerance and tissue response   Patient Response/Progress Hip Abd MMT 5-/5 B after MET; wall sit endurance improved after quad fascial rolling (and not painful)   Manual Therapy 2 MET   Manual Therapy 2 - Details L Hip distraction w/ R hip hike; derotation in supine; shotgun into add/abd   Manual Therapy Manual Therapy 2   Education   Learner/Method Patient;Listening;Reading;Demonstration;Pictures/Video;No Barriers to Learning   Plan   Home program PTRx   Updates to plan of care will see MD prior to continuing PT   Plan for next session continue work on quad strength and glute activation   Total Session Time   Timed Code Treatment Minutes 40   Total Treatment Time (sum of timed and untimed services) 40       PLAN  Continue therapy per current plan of care.    Beginning/End Dates of Progress Note Reporting Period:  8/12/24  to 09/30/2024    Referring Provider:  Markus Coombs DO

## 2025-04-12 ENCOUNTER — HEALTH MAINTENANCE LETTER (OUTPATIENT)
Age: 50
End: 2025-04-12